# Patient Record
Sex: MALE | Race: BLACK OR AFRICAN AMERICAN | Employment: OTHER | URBAN - METROPOLITAN AREA
[De-identification: names, ages, dates, MRNs, and addresses within clinical notes are randomized per-mention and may not be internally consistent; named-entity substitution may affect disease eponyms.]

---

## 2018-04-04 ENCOUNTER — HOSPITAL ENCOUNTER (OUTPATIENT)
Dept: GENERAL RADIOLOGY | Age: 63
Discharge: HOME | End: 2018-04-04
Payer: MEDICARE

## 2018-04-04 DIAGNOSIS — M54.2 NECK PAIN: ICD-10-CM

## 2018-04-04 PROCEDURE — 72040 X-RAY EXAM NECK SPINE 2-3 VW: CPT

## 2021-01-01 ENCOUNTER — HOME CARE VISIT (OUTPATIENT)
Dept: SCHEDULING | Facility: HOME HEALTH | Age: 66
End: 2021-01-01
Payer: MEDICARE

## 2021-01-01 ENCOUNTER — HOME HEALTH ADMISSION (OUTPATIENT)
Dept: HOME HEALTH SERVICES | Facility: HOME HEALTH | Age: 66
End: 2021-01-01
Payer: MEDICARE

## 2021-01-01 ENCOUNTER — DOCUMENTATION ONLY (OUTPATIENT)
Dept: HEMATOLOGY | Age: 66
End: 2021-01-01

## 2021-01-01 ENCOUNTER — HOSPITAL ENCOUNTER (OUTPATIENT)
Age: 66
Setting detail: OBSERVATION
Discharge: HOME | End: 2021-10-08
Attending: EMERGENCY MEDICINE | Admitting: INTERNAL MEDICINE
Payer: MEDICARE

## 2021-01-01 ENCOUNTER — HOME CARE VISIT (OUTPATIENT)
Dept: HOME HEALTH SERVICES | Facility: HOME HEALTH | Age: 66
End: 2021-01-01
Payer: MEDICARE

## 2021-01-01 ENCOUNTER — HOSPITAL ENCOUNTER (OUTPATIENT)
Age: 66
Setting detail: OUTPATIENT SURGERY
Discharge: HOME | End: 2021-08-16
Attending: INTERNAL MEDICINE | Admitting: INTERNAL MEDICINE
Payer: MEDICARE

## 2021-01-01 ENCOUNTER — HOSPITAL ENCOUNTER (OUTPATIENT)
Dept: RADIATION ONCOLOGY | Age: 66
Discharge: HOME | End: 2021-10-20
Payer: MEDICARE

## 2021-01-01 ENCOUNTER — HOSPITAL ENCOUNTER (OUTPATIENT)
Dept: PET IMAGING | Age: 66
Discharge: HOME | End: 2021-08-26
Payer: MEDICARE

## 2021-01-01 ENCOUNTER — HOSPITAL ENCOUNTER (OUTPATIENT)
Dept: ULTRASOUND IMAGING | Age: 66
Discharge: HOME | End: 2021-09-03
Attending: INTERNAL MEDICINE
Payer: MEDICARE

## 2021-01-01 ENCOUNTER — APPOINTMENT (OUTPATIENT)
Dept: GENERAL RADIOLOGY | Age: 66
DRG: 871 | End: 2021-01-01
Attending: EMERGENCY MEDICINE
Payer: MEDICARE

## 2021-01-01 ENCOUNTER — HOSPITAL ENCOUNTER (OUTPATIENT)
Dept: RADIATION ONCOLOGY | Age: 66
Discharge: HOME | End: 2021-10-21
Payer: MEDICARE

## 2021-01-01 ENCOUNTER — PATIENT OUTREACH (OUTPATIENT)
Dept: CASE MANAGEMENT | Age: 66
End: 2021-01-01

## 2021-01-01 ENCOUNTER — APPOINTMENT (OUTPATIENT)
Dept: CT IMAGING | Age: 66
DRG: 871 | End: 2021-01-01
Attending: EMERGENCY MEDICINE
Payer: MEDICARE

## 2021-01-01 ENCOUNTER — HOSPITAL ENCOUNTER (OUTPATIENT)
Dept: RADIATION ONCOLOGY | Age: 66
Discharge: HOME | End: 2021-10-25
Payer: MEDICARE

## 2021-01-01 ENCOUNTER — ANESTHESIA EVENT (OUTPATIENT)
Dept: SURGERY | Age: 66
End: 2021-01-01

## 2021-01-01 ENCOUNTER — ANESTHESIA (OUTPATIENT)
Dept: SURGERY | Age: 66
End: 2021-01-01

## 2021-01-01 ENCOUNTER — HOSPITAL ENCOUNTER (OUTPATIENT)
Dept: INFUSION THERAPY | Age: 66
End: 2021-01-01

## 2021-01-01 ENCOUNTER — APPOINTMENT (OUTPATIENT)
Dept: GENERAL RADIOLOGY | Age: 66
End: 2021-01-01
Attending: INTERNAL MEDICINE
Payer: MEDICARE

## 2021-01-01 ENCOUNTER — HOSPITAL ENCOUNTER (OUTPATIENT)
Dept: RADIATION ONCOLOGY | Age: 66
Discharge: HOME | End: 2021-10-27
Payer: MEDICARE

## 2021-01-01 ENCOUNTER — TRANSCRIBE ORDER (OUTPATIENT)
Dept: SCHEDULING | Age: 66
End: 2021-01-01

## 2021-01-01 ENCOUNTER — TRANSCRIBE ORDER (OUTPATIENT)
Dept: REGISTRATION | Age: 66
End: 2021-01-01

## 2021-01-01 ENCOUNTER — HOSPITAL ENCOUNTER (OUTPATIENT)
Dept: RADIATION ONCOLOGY | Age: 66
Discharge: HOME | End: 2021-10-22
Payer: MEDICARE

## 2021-01-01 ENCOUNTER — HOSPITAL ENCOUNTER (OUTPATIENT)
Dept: RADIATION ONCOLOGY | Age: 66
Discharge: HOME | End: 2021-09-08
Payer: MEDICARE

## 2021-01-01 ENCOUNTER — APPOINTMENT (OUTPATIENT)
Dept: GENERAL RADIOLOGY | Age: 66
DRG: 054 | End: 2021-01-01
Attending: EMERGENCY MEDICINE
Payer: MEDICARE

## 2021-01-01 ENCOUNTER — APPOINTMENT (OUTPATIENT)
Dept: GENERAL RADIOLOGY | Age: 66
End: 2021-01-01
Attending: EMERGENCY MEDICINE
Payer: MEDICARE

## 2021-01-01 ENCOUNTER — HOSPITAL ENCOUNTER (INPATIENT)
Age: 66
LOS: 4 days | Discharge: HOME HEALTH | DRG: 054 | End: 2021-10-22
Attending: EMERGENCY MEDICINE | Admitting: HOSPITALIST
Payer: MEDICARE

## 2021-01-01 ENCOUNTER — HOSPITAL ENCOUNTER (OUTPATIENT)
Dept: INTERVENTIONAL RADIOLOGY/VASCULAR | Age: 66
Discharge: HOME | End: 2021-09-14
Attending: INTERNAL MEDICINE

## 2021-01-01 ENCOUNTER — APPOINTMENT (OUTPATIENT)
Dept: CT IMAGING | Age: 66
End: 2021-01-01
Attending: INTERNAL MEDICINE
Payer: MEDICARE

## 2021-01-01 ENCOUNTER — HOSPITAL ENCOUNTER (OUTPATIENT)
Dept: GENERAL RADIOLOGY | Age: 66
Discharge: HOME | End: 2021-08-05
Payer: MEDICARE

## 2021-01-01 ENCOUNTER — HOSPITAL ENCOUNTER (OUTPATIENT)
Dept: INFUSION THERAPY | Age: 66
Discharge: HOME | End: 2021-09-29
Payer: MEDICARE

## 2021-01-01 ENCOUNTER — TELEPHONE (OUTPATIENT)
Dept: CASE MANAGEMENT | Age: 66
End: 2021-01-01

## 2021-01-01 ENCOUNTER — APPOINTMENT (OUTPATIENT)
Dept: GENERAL RADIOLOGY | Age: 66
DRG: 054 | End: 2021-01-01
Attending: NURSE PRACTITIONER
Payer: MEDICARE

## 2021-01-01 ENCOUNTER — HOSPITAL ENCOUNTER (OUTPATIENT)
Dept: MRI IMAGING | Age: 66
Discharge: HOME | DRG: 054 | End: 2021-10-16
Attending: RADIOLOGY
Payer: MEDICARE

## 2021-01-01 ENCOUNTER — HOSPITAL ENCOUNTER (OUTPATIENT)
Dept: RADIATION ONCOLOGY | Age: 66
Discharge: HOME | End: 2021-09-10
Payer: MEDICARE

## 2021-01-01 ENCOUNTER — HOSPITAL ENCOUNTER (INPATIENT)
Age: 66
LOS: 1 days | DRG: 871 | End: 2021-10-29
Attending: EMERGENCY MEDICINE | Admitting: FAMILY MEDICINE
Payer: MEDICARE

## 2021-01-01 ENCOUNTER — HOSPITAL ENCOUNTER (OUTPATIENT)
Dept: LAB | Age: 66
Discharge: HOME | DRG: 846 | End: 2021-09-29
Payer: MEDICARE

## 2021-01-01 ENCOUNTER — HOSPITAL ENCOUNTER (OUTPATIENT)
Dept: RADIATION ONCOLOGY | Age: 66
End: 2021-01-01
Payer: MEDICARE

## 2021-01-01 ENCOUNTER — HOSPITAL ENCOUNTER (OUTPATIENT)
Dept: RADIATION ONCOLOGY | Age: 66
Discharge: HOME | End: 2021-10-26
Payer: MEDICARE

## 2021-01-01 ENCOUNTER — HOSPITAL ENCOUNTER (OUTPATIENT)
Dept: CT IMAGING | Age: 66
Discharge: HOME | End: 2021-08-12
Attending: INTERNAL MEDICINE
Payer: MEDICARE

## 2021-01-01 ENCOUNTER — HOSPITAL ENCOUNTER (OUTPATIENT)
Dept: RADIATION ONCOLOGY | Age: 66
Discharge: HOME | End: 2021-09-09
Payer: MEDICARE

## 2021-01-01 ENCOUNTER — HOSPITAL ENCOUNTER (OUTPATIENT)
Dept: RADIATION ONCOLOGY | Age: 66
Discharge: HOME | End: 2021-10-28
Payer: MEDICARE

## 2021-01-01 ENCOUNTER — HOSPITAL ENCOUNTER (INPATIENT)
Age: 66
LOS: 4 days | Discharge: HOME HEALTH | DRG: 846 | End: 2021-10-03
Attending: INTERNAL MEDICINE | Admitting: INTERNAL MEDICINE
Payer: MEDICARE

## 2021-01-01 ENCOUNTER — HOSPITAL ENCOUNTER (OUTPATIENT)
Dept: INFUSION THERAPY | Age: 66
Discharge: HOME | End: 2021-09-23
Payer: MEDICARE

## 2021-01-01 ENCOUNTER — HOSPITAL ENCOUNTER (OUTPATIENT)
Dept: LAB | Age: 66
Discharge: HOME | End: 2021-09-23
Payer: MEDICARE

## 2021-01-01 ENCOUNTER — HOSPITAL ENCOUNTER (EMERGENCY)
Age: 66
Discharge: LEFT BEFORE DISCHARGE | End: 2021-09-10
Payer: MEDICARE

## 2021-01-01 ENCOUNTER — HOSPITAL ENCOUNTER (OUTPATIENT)
Dept: RADIATION ONCOLOGY | Age: 66
Discharge: HOME | End: 2021-09-01
Payer: MEDICARE

## 2021-01-01 VITALS
WEIGHT: 183 LBS | HEIGHT: 71 IN | DIASTOLIC BLOOD PRESSURE: 61 MMHG | RESPIRATION RATE: 18 BRPM | BODY MASS INDEX: 25.62 KG/M2 | OXYGEN SATURATION: 100 % | TEMPERATURE: 98 F | HEART RATE: 104 BPM | SYSTOLIC BLOOD PRESSURE: 99 MMHG

## 2021-01-01 VITALS
TEMPERATURE: 97 F | HEART RATE: 83 BPM | SYSTOLIC BLOOD PRESSURE: 116 MMHG | DIASTOLIC BLOOD PRESSURE: 68 MMHG | RESPIRATION RATE: 18 BRPM | OXYGEN SATURATION: 96 %

## 2021-01-01 VITALS
OXYGEN SATURATION: 100 % | TEMPERATURE: 97.9 F | WEIGHT: 133.5 LBS | DIASTOLIC BLOOD PRESSURE: 77 MMHG | HEART RATE: 94 BPM | BODY MASS INDEX: 18.69 KG/M2 | RESPIRATION RATE: 20 BRPM | HEIGHT: 71 IN | SYSTOLIC BLOOD PRESSURE: 122 MMHG

## 2021-01-01 VITALS
DIASTOLIC BLOOD PRESSURE: 69 MMHG | WEIGHT: 176 LBS | TEMPERATURE: 98.3 F | RESPIRATION RATE: 18 BRPM | BODY MASS INDEX: 24.55 KG/M2 | OXYGEN SATURATION: 99 % | HEART RATE: 121 BPM | SYSTOLIC BLOOD PRESSURE: 101 MMHG

## 2021-01-01 VITALS
HEART RATE: 110 BPM | DIASTOLIC BLOOD PRESSURE: 69 MMHG | OXYGEN SATURATION: 98 % | WEIGHT: 176 LBS | SYSTOLIC BLOOD PRESSURE: 111 MMHG | TEMPERATURE: 98 F | BODY MASS INDEX: 24.64 KG/M2 | RESPIRATION RATE: 18 BRPM | HEIGHT: 71 IN

## 2021-01-01 VITALS
TEMPERATURE: 97 F | RESPIRATION RATE: 18 BRPM | SYSTOLIC BLOOD PRESSURE: 114 MMHG | HEART RATE: 89 BPM | OXYGEN SATURATION: 97 % | DIASTOLIC BLOOD PRESSURE: 64 MMHG

## 2021-01-01 VITALS
WEIGHT: 176 LBS | HEIGHT: 71 IN | TEMPERATURE: 97.6 F | OXYGEN SATURATION: 100 % | BODY MASS INDEX: 24.64 KG/M2 | RESPIRATION RATE: 16 BRPM | DIASTOLIC BLOOD PRESSURE: 73 MMHG | SYSTOLIC BLOOD PRESSURE: 108 MMHG | HEART RATE: 92 BPM

## 2021-01-01 VITALS
SYSTOLIC BLOOD PRESSURE: 108 MMHG | TEMPERATURE: 97.4 F | DIASTOLIC BLOOD PRESSURE: 63 MMHG | RESPIRATION RATE: 18 BRPM | HEART RATE: 97 BPM | OXYGEN SATURATION: 97 %

## 2021-01-01 VITALS
HEART RATE: 98 BPM | DIASTOLIC BLOOD PRESSURE: 65 MMHG | OXYGEN SATURATION: 97 % | RESPIRATION RATE: 18 BRPM | SYSTOLIC BLOOD PRESSURE: 111 MMHG | TEMPERATURE: 97.6 F

## 2021-01-01 VITALS
SYSTOLIC BLOOD PRESSURE: 98 MMHG | OXYGEN SATURATION: 98 % | RESPIRATION RATE: 18 BRPM | TEMPERATURE: 98.3 F | DIASTOLIC BLOOD PRESSURE: 60 MMHG | HEART RATE: 124 BPM

## 2021-01-01 VITALS
OXYGEN SATURATION: 96 % | TEMPERATURE: 96.9 F | DIASTOLIC BLOOD PRESSURE: 78 MMHG | RESPIRATION RATE: 17 BRPM | HEART RATE: 118 BPM | SYSTOLIC BLOOD PRESSURE: 118 MMHG

## 2021-01-01 VITALS
DIASTOLIC BLOOD PRESSURE: 76 MMHG | HEIGHT: 69 IN | BODY MASS INDEX: 24.02 KG/M2 | SYSTOLIC BLOOD PRESSURE: 117 MMHG | TEMPERATURE: 97.5 F | RESPIRATION RATE: 18 BRPM | HEART RATE: 103 BPM | OXYGEN SATURATION: 99 % | WEIGHT: 162.2 LBS

## 2021-01-01 VITALS
SYSTOLIC BLOOD PRESSURE: 94 MMHG | DIASTOLIC BLOOD PRESSURE: 72 MMHG | TEMPERATURE: 100.2 F | WEIGHT: 166 LBS | HEIGHT: 71 IN | RESPIRATION RATE: 18 BRPM | BODY MASS INDEX: 23.24 KG/M2 | HEART RATE: 107 BPM | OXYGEN SATURATION: 100 %

## 2021-01-01 VITALS
SYSTOLIC BLOOD PRESSURE: 110 MMHG | DIASTOLIC BLOOD PRESSURE: 62 MMHG | TEMPERATURE: 98.4 F | HEART RATE: 90 BPM | OXYGEN SATURATION: 98 % | RESPIRATION RATE: 18 BRPM

## 2021-01-01 VITALS
BODY MASS INDEX: 15.82 KG/M2 | DIASTOLIC BLOOD PRESSURE: 44 MMHG | RESPIRATION RATE: 16 BRPM | HEIGHT: 71 IN | TEMPERATURE: 97.3 F | OXYGEN SATURATION: 87 % | SYSTOLIC BLOOD PRESSURE: 107 MMHG | WEIGHT: 113 LBS

## 2021-01-01 VITALS
TEMPERATURE: 99.3 F | HEART RATE: 122 BPM | WEIGHT: 176.5 LBS | OXYGEN SATURATION: 95 % | BODY MASS INDEX: 24.62 KG/M2 | DIASTOLIC BLOOD PRESSURE: 65 MMHG | SYSTOLIC BLOOD PRESSURE: 104 MMHG

## 2021-01-01 VITALS — DIASTOLIC BLOOD PRESSURE: 69 MMHG | HEART RATE: 85 BPM | SYSTOLIC BLOOD PRESSURE: 106 MMHG | TEMPERATURE: 98.3 F

## 2021-01-01 DIAGNOSIS — J90 RECURRENT RIGHT PLEURAL EFFUSION: ICD-10-CM

## 2021-01-01 DIAGNOSIS — N17.9 SEPSIS WITH ACUTE RENAL FAILURE AND SEPTIC SHOCK, DUE TO UNSPECIFIED ORGANISM, UNSPECIFIED ACUTE RENAL FAILURE TYPE (HCC): ICD-10-CM

## 2021-01-01 DIAGNOSIS — R06.02 SHORTNESS OF BREATH: Primary | ICD-10-CM

## 2021-01-01 DIAGNOSIS — C61 PROSTATE CANCER (HCC): ICD-10-CM

## 2021-01-01 DIAGNOSIS — Z71.89 ACP (ADVANCE CARE PLANNING): ICD-10-CM

## 2021-01-01 DIAGNOSIS — E43 SEVERE PROTEIN-CALORIE MALNUTRITION (HCC): Chronic | ICD-10-CM

## 2021-01-01 DIAGNOSIS — R91.8 LUNG MASS: Primary | ICD-10-CM

## 2021-01-01 DIAGNOSIS — C79.31 BRAIN METASTASIS (HCC): ICD-10-CM

## 2021-01-01 DIAGNOSIS — I46.9 CARDIOPULMONARY ARREST (HCC): ICD-10-CM

## 2021-01-01 DIAGNOSIS — C34.90 MALIGNANT NEOPLASM OF LUNG, UNSPECIFIED LATERALITY, UNSPECIFIED PART OF LUNG (HCC): ICD-10-CM

## 2021-01-01 DIAGNOSIS — C34.90 PRIMARY MALIGNANT NEOPLASM OF LUNG METASTATIC TO OTHER SITE, UNSPECIFIED LATERALITY (HCC): ICD-10-CM

## 2021-01-01 DIAGNOSIS — C34.91 SMALL CELL LUNG CARCINOMA, RIGHT (HCC): ICD-10-CM

## 2021-01-01 DIAGNOSIS — K63.1 INTESTINAL PERFORATION (HCC): Primary | ICD-10-CM

## 2021-01-01 DIAGNOSIS — J90 PLEURAL EFFUSION: Primary | ICD-10-CM

## 2021-01-01 DIAGNOSIS — R07.89 OTHER CHEST PAIN: ICD-10-CM

## 2021-01-01 DIAGNOSIS — R07.89 OTHER CHEST PAIN: Primary | ICD-10-CM

## 2021-01-01 DIAGNOSIS — E86.0 DEHYDRATION: Primary | ICD-10-CM

## 2021-01-01 DIAGNOSIS — R65.21 SEPSIS WITH ACUTE RENAL FAILURE AND SEPTIC SHOCK, DUE TO UNSPECIFIED ORGANISM, UNSPECIFIED ACUTE RENAL FAILURE TYPE (HCC): ICD-10-CM

## 2021-01-01 DIAGNOSIS — R59.9 ADENOPATHY: ICD-10-CM

## 2021-01-01 DIAGNOSIS — M89.9 RIB LESION: ICD-10-CM

## 2021-01-01 DIAGNOSIS — R11.0 NAUSEA: ICD-10-CM

## 2021-01-01 DIAGNOSIS — R91.8 HILAR MASS: ICD-10-CM

## 2021-01-01 DIAGNOSIS — R06.6 HICCUPS: ICD-10-CM

## 2021-01-01 DIAGNOSIS — J98.59 MEDIASTINAL MASS: ICD-10-CM

## 2021-01-01 DIAGNOSIS — G89.3 CANCER RELATED PAIN: ICD-10-CM

## 2021-01-01 DIAGNOSIS — R11.2 NAUSEA AND VOMITING, INTRACTABILITY OF VOMITING NOT SPECIFIED, UNSPECIFIED VOMITING TYPE: ICD-10-CM

## 2021-01-01 DIAGNOSIS — R59.0 AXILLARY ADENOPATHY: ICD-10-CM

## 2021-01-01 DIAGNOSIS — Z75.1: ICD-10-CM

## 2021-01-01 DIAGNOSIS — R07.9 CHEST PAIN: ICD-10-CM

## 2021-01-01 DIAGNOSIS — R07.9 CHEST PAIN, UNSPECIFIED TYPE: ICD-10-CM

## 2021-01-01 DIAGNOSIS — R06.02 SHORTNESS OF BREATH: ICD-10-CM

## 2021-01-01 DIAGNOSIS — E86.0 DEHYDRATION: ICD-10-CM

## 2021-01-01 DIAGNOSIS — D64.9 ANEMIA, UNSPECIFIED TYPE: ICD-10-CM

## 2021-01-01 DIAGNOSIS — E87.6 HYPOKALEMIA: ICD-10-CM

## 2021-01-01 DIAGNOSIS — R91.8 LUNG MASS: ICD-10-CM

## 2021-01-01 DIAGNOSIS — J90 PLEURAL EFFUSION: ICD-10-CM

## 2021-01-01 DIAGNOSIS — Z51.5 ENCOUNTER FOR PALLIATIVE CARE: ICD-10-CM

## 2021-01-01 DIAGNOSIS — R07.81 RIB PAIN ON LEFT SIDE: ICD-10-CM

## 2021-01-01 DIAGNOSIS — R19.7 DIARRHEA, UNSPECIFIED TYPE: ICD-10-CM

## 2021-01-01 DIAGNOSIS — R06.02 SOB (SHORTNESS OF BREATH): ICD-10-CM

## 2021-01-01 DIAGNOSIS — C34.91 SMALL CELL LUNG CARCINOMA, RIGHT (HCC): Chronic | ICD-10-CM

## 2021-01-01 DIAGNOSIS — C34.91 SMALL CELL LUNG CARCINOMA, RIGHT (HCC): Primary | ICD-10-CM

## 2021-01-01 DIAGNOSIS — R94.8 ABNORMAL POSITRON EMISSION TOMOGRAPHY (PET) SCAN: ICD-10-CM

## 2021-01-01 DIAGNOSIS — J90 PLEURAL EFFUSION ON RIGHT: ICD-10-CM

## 2021-01-01 DIAGNOSIS — R11.2 INTRACTABLE VOMITING WITH NAUSEA: ICD-10-CM

## 2021-01-01 DIAGNOSIS — R10.84 GENERALIZED ABDOMINAL PAIN: ICD-10-CM

## 2021-01-01 DIAGNOSIS — E27.8 ADRENAL NODULE (HCC): ICD-10-CM

## 2021-01-01 DIAGNOSIS — A41.9 SEPSIS WITH ACUTE RENAL FAILURE AND SEPTIC SHOCK, DUE TO UNSPECIFIED ORGANISM, UNSPECIFIED ACUTE RENAL FAILURE TYPE (HCC): ICD-10-CM

## 2021-01-01 LAB
ACID FAST STN SPEC: NEGATIVE
ALBUMIN SERPL-MCNC: 1.3 G/DL (ref 3.2–4.6)
ALBUMIN SERPL-MCNC: 1.9 G/DL (ref 3.2–4.6)
ALBUMIN SERPL-MCNC: 2 G/DL (ref 3.2–4.6)
ALBUMIN SERPL-MCNC: 2 G/DL (ref 3.2–4.6)
ALBUMIN SERPL-MCNC: 2.1 G/DL (ref 3.2–4.6)
ALBUMIN SERPL-MCNC: 2.1 G/DL (ref 3.2–4.6)
ALBUMIN SERPL-MCNC: 2.2 G/DL (ref 3.2–4.6)
ALBUMIN SERPL-MCNC: 2.3 G/DL (ref 3.2–4.6)
ALBUMIN SERPL-MCNC: 2.4 G/DL (ref 3.2–4.6)
ALBUMIN SERPL-MCNC: 2.4 G/DL (ref 3.2–4.6)
ALBUMIN SERPL-MCNC: 2.6 G/DL (ref 3.2–4.6)
ALBUMIN SERPL-MCNC: 2.6 G/DL (ref 3.2–4.6)
ALBUMIN/GLOB SERPL: 0.3 {RATIO} (ref 1.2–3.5)
ALBUMIN/GLOB SERPL: 0.4 {RATIO} (ref 1.2–3.5)
ALP SERPL-CCNC: 50 U/L (ref 50–136)
ALP SERPL-CCNC: 52 U/L (ref 50–136)
ALP SERPL-CCNC: 54 U/L (ref 50–136)
ALP SERPL-CCNC: 55 U/L (ref 50–136)
ALP SERPL-CCNC: 56 U/L (ref 50–136)
ALP SERPL-CCNC: 58 U/L (ref 50–136)
ALP SERPL-CCNC: 65 U/L (ref 50–136)
ALP SERPL-CCNC: 65 U/L (ref 50–136)
ALP SERPL-CCNC: 72 U/L (ref 50–136)
ALP SERPL-CCNC: 72 U/L (ref 50–136)
ALP SERPL-CCNC: 78 U/L (ref 50–136)
ALP SERPL-CCNC: 80 U/L (ref 50–136)
ALP SERPL-CCNC: 80 U/L (ref 50–136)
ALP SERPL-CCNC: 81 U/L (ref 50–136)
ALT SERPL-CCNC: 10 U/L (ref 12–65)
ALT SERPL-CCNC: 10 U/L (ref 12–65)
ALT SERPL-CCNC: 12 U/L (ref 12–65)
ALT SERPL-CCNC: 13 U/L (ref 12–65)
ALT SERPL-CCNC: 14 U/L (ref 12–65)
ALT SERPL-CCNC: 17 U/L (ref 12–65)
ALT SERPL-CCNC: 1850 U/L (ref 12–65)
ALT SERPL-CCNC: 32 U/L (ref 12–65)
ALT SERPL-CCNC: 35 U/L (ref 12–65)
ALT SERPL-CCNC: 8 U/L (ref 12–65)
ALT SERPL-CCNC: 8 U/L (ref 12–65)
ALT SERPL-CCNC: 9 U/L (ref 12–65)
ANION GAP SERPL CALC-SCNC: 10 MMOL/L (ref 7–16)
ANION GAP SERPL CALC-SCNC: 12 MMOL/L (ref 7–16)
ANION GAP SERPL CALC-SCNC: 2 MMOL/L (ref 7–16)
ANION GAP SERPL CALC-SCNC: 20 MMOL/L (ref 7–16)
ANION GAP SERPL CALC-SCNC: 22 MMOL/L (ref 7–16)
ANION GAP SERPL CALC-SCNC: 5 MMOL/L (ref 7–16)
ANION GAP SERPL CALC-SCNC: 6 MMOL/L (ref 7–16)
ANION GAP SERPL CALC-SCNC: 7 MMOL/L (ref 7–16)
ANION GAP SERPL CALC-SCNC: 7 MMOL/L (ref 7–16)
ANION GAP SERPL CALC-SCNC: 8 MMOL/L (ref 7–16)
ANION GAP SERPL CALC-SCNC: 9 MMOL/L (ref 7–16)
APPEARANCE FLD: NORMAL
APPEARANCE UR: NORMAL
APTT PPP: 36.5 SEC (ref 24.1–35.1)
ARTERIAL PATENCY WRIST A: ABNORMAL
ARTERIAL PATENCY WRIST A: ABNORMAL
ARTERIAL PATENCY WRIST A: POSITIVE
AST SERPL-CCNC: 15 U/L (ref 15–37)
AST SERPL-CCNC: 18 U/L (ref 15–37)
AST SERPL-CCNC: 21 U/L (ref 15–37)
AST SERPL-CCNC: 2487 U/L (ref 15–37)
AST SERPL-CCNC: 25 U/L (ref 15–37)
AST SERPL-CCNC: 26 U/L (ref 15–37)
AST SERPL-CCNC: 27 U/L (ref 15–37)
AST SERPL-CCNC: 28 U/L (ref 15–37)
AST SERPL-CCNC: 30 U/L (ref 15–37)
AST SERPL-CCNC: 32 U/L (ref 15–37)
AST SERPL-CCNC: 38 U/L (ref 15–37)
AST SERPL-CCNC: 39 U/L (ref 15–37)
AST SERPL-CCNC: 41 U/L (ref 15–37)
AST SERPL-CCNC: 45 U/L (ref 15–37)
ATRIAL RATE: 111 BPM
ATRIAL RATE: 115 BPM
BACTERIA SPEC CULT: NORMAL
BASE DEFICIT BLD-SCNC: 14.8 MMOL/L
BASE DEFICIT BLD-SCNC: 19.9 MMOL/L
BASE DEFICIT BLD-SCNC: 23.2 MMOL/L
BASE DEFICIT BLD-SCNC: 6.1 MMOL/L
BASOPHILS # BLD: 0 K/UL (ref 0–0.2)
BASOPHILS # BLD: 0.1 K/UL (ref 0–0.2)
BASOPHILS NFR BLD: 0 % (ref 0–2)
BASOPHILS NFR BLD: 1 % (ref 0–2)
BASOPHILS NFR BLD: 2 % (ref 0–2)
BASOPHILS NFR FLD: 2 %
BDY SITE: ABNORMAL
BILIRUB SERPL-MCNC: 0.1 MG/DL (ref 0.2–1.1)
BILIRUB SERPL-MCNC: 0.1 MG/DL (ref 0.2–1.1)
BILIRUB SERPL-MCNC: 0.2 MG/DL (ref 0.2–1.1)
BILIRUB SERPL-MCNC: 0.2 MG/DL (ref 0.2–1.1)
BILIRUB SERPL-MCNC: 0.3 MG/DL (ref 0.2–1.1)
BILIRUB SERPL-MCNC: 0.4 MG/DL (ref 0.2–1.1)
BILIRUB SERPL-MCNC: 0.5 MG/DL (ref 0.2–1.1)
BILIRUB SERPL-MCNC: 0.6 MG/DL (ref 0.2–1.1)
BILIRUB SERPL-MCNC: 0.9 MG/DL (ref 0.2–1.1)
BILIRUB SERPL-MCNC: 1 MG/DL (ref 0.2–1.1)
BILIRUB UR QL: NEGATIVE
BNP SERPL-MCNC: 1198 PG/ML (ref 5–125)
BNP SERPL-MCNC: 173 PG/ML (ref 5–125)
BNP SERPL-MCNC: 229 PG/ML (ref 5–125)
BUN SERPL-MCNC: 10 MG/DL (ref 8–23)
BUN SERPL-MCNC: 10 MG/DL (ref 8–23)
BUN SERPL-MCNC: 12 MG/DL (ref 8–23)
BUN SERPL-MCNC: 12 MG/DL (ref 8–23)
BUN SERPL-MCNC: 13 MG/DL (ref 8–23)
BUN SERPL-MCNC: 14 MG/DL (ref 8–23)
BUN SERPL-MCNC: 15 MG/DL (ref 8–23)
BUN SERPL-MCNC: 29 MG/DL (ref 8–23)
BUN SERPL-MCNC: 36 MG/DL (ref 8–23)
BUN SERPL-MCNC: 4 MG/DL (ref 8–23)
BUN SERPL-MCNC: 7 MG/DL (ref 8–23)
BUN SERPL-MCNC: 9 MG/DL (ref 8–23)
CALCIUM SERPL-MCNC: 10 MG/DL (ref 8.3–10.4)
CALCIUM SERPL-MCNC: 10.2 MG/DL (ref 8.3–10.4)
CALCIUM SERPL-MCNC: 11.3 MG/DL (ref 8.3–10.4)
CALCIUM SERPL-MCNC: 8.9 MG/DL (ref 8.3–10.4)
CALCIUM SERPL-MCNC: 9.1 MG/DL (ref 8.3–10.4)
CALCIUM SERPL-MCNC: 9.2 MG/DL (ref 8.3–10.4)
CALCIUM SERPL-MCNC: 9.3 MG/DL (ref 8.3–10.4)
CALCIUM SERPL-MCNC: 9.3 MG/DL (ref 8.3–10.4)
CALCIUM SERPL-MCNC: 9.4 MG/DL (ref 8.3–10.4)
CALCIUM SERPL-MCNC: 9.5 MG/DL (ref 8.3–10.4)
CALCIUM SERPL-MCNC: 9.6 MG/DL (ref 8.3–10.4)
CALCIUM SERPL-MCNC: 9.6 MG/DL (ref 8.3–10.4)
CALCIUM SERPL-MCNC: 9.9 MG/DL (ref 8.3–10.4)
CALCULATED P AXIS, ECG09: 65 DEGREES
CALCULATED P AXIS, ECG09: 68 DEGREES
CALCULATED R AXIS, ECG10: 87 DEGREES
CALCULATED R AXIS, ECG10: 92 DEGREES
CALCULATED T AXIS, ECG11: -17 DEGREES
CALCULATED T AXIS, ECG11: 66 DEGREES
CHLORIDE SERPL-SCNC: 100 MMOL/L (ref 98–107)
CHLORIDE SERPL-SCNC: 100 MMOL/L (ref 98–107)
CHLORIDE SERPL-SCNC: 101 MMOL/L (ref 98–107)
CHLORIDE SERPL-SCNC: 102 MMOL/L (ref 98–107)
CHLORIDE SERPL-SCNC: 103 MMOL/L (ref 98–107)
CHLORIDE SERPL-SCNC: 103 MMOL/L (ref 98–107)
CHLORIDE SERPL-SCNC: 104 MMOL/L (ref 98–107)
CHLORIDE SERPL-SCNC: 105 MMOL/L (ref 98–107)
CHLORIDE SERPL-SCNC: 107 MMOL/L (ref 98–107)
CHLORIDE SERPL-SCNC: 92 MMOL/L (ref 98–107)
CHLORIDE SERPL-SCNC: 92 MMOL/L (ref 98–107)
CHLORIDE SERPL-SCNC: 96 MMOL/L (ref 98–107)
CHLORIDE SERPL-SCNC: 98 MMOL/L (ref 98–107)
CO2 BLD-SCNC: 17 MMOL/L (ref 13–23)
CO2 SERPL-SCNC: 12 MMOL/L (ref 21–32)
CO2 SERPL-SCNC: 18 MMOL/L (ref 21–32)
CO2 SERPL-SCNC: 22 MMOL/L (ref 21–32)
CO2 SERPL-SCNC: 24 MMOL/L (ref 21–32)
CO2 SERPL-SCNC: 25 MMOL/L (ref 21–32)
CO2 SERPL-SCNC: 25 MMOL/L (ref 21–32)
CO2 SERPL-SCNC: 26 MMOL/L (ref 21–32)
CO2 SERPL-SCNC: 26 MMOL/L (ref 21–32)
CO2 SERPL-SCNC: 27 MMOL/L (ref 21–32)
CO2 SERPL-SCNC: 28 MMOL/L (ref 21–32)
CO2 SERPL-SCNC: 28 MMOL/L (ref 21–32)
CO2 SERPL-SCNC: 29 MMOL/L (ref 21–32)
CO2 SERPL-SCNC: 29 MMOL/L (ref 21–32)
CO2 SERPL-SCNC: 30 MMOL/L (ref 21–32)
COLOR FLD: NORMAL
COLOR UR: YELLOW
COVID-19 RAPID TEST, COVR: NOT DETECTED
CREAT BLD-MCNC: 0.62 MG/DL (ref 0.8–1.5)
CREAT SERPL-MCNC: 0.34 MG/DL (ref 0.8–1.5)
CREAT SERPL-MCNC: 0.42 MG/DL (ref 0.8–1.5)
CREAT SERPL-MCNC: 0.44 MG/DL (ref 0.8–1.5)
CREAT SERPL-MCNC: 0.44 MG/DL (ref 0.8–1.5)
CREAT SERPL-MCNC: 0.48 MG/DL (ref 0.8–1.5)
CREAT SERPL-MCNC: 0.49 MG/DL (ref 0.8–1.5)
CREAT SERPL-MCNC: 0.51 MG/DL (ref 0.8–1.5)
CREAT SERPL-MCNC: 0.54 MG/DL (ref 0.8–1.5)
CREAT SERPL-MCNC: 0.54 MG/DL (ref 0.8–1.5)
CREAT SERPL-MCNC: 0.55 MG/DL (ref 0.8–1.5)
CREAT SERPL-MCNC: 0.56 MG/DL (ref 0.8–1.5)
CREAT SERPL-MCNC: 0.6 MG/DL (ref 0.8–1.5)
CREAT SERPL-MCNC: 0.62 MG/DL (ref 0.8–1.5)
CREAT SERPL-MCNC: 1.31 MG/DL (ref 0.8–1.5)
CREAT SERPL-MCNC: 1.65 MG/DL (ref 0.8–1.5)
D DIMER PPP FEU-MCNC: 19.19 UG/ML(FEU)
DIAGNOSIS, 93000: NORMAL
DIAGNOSIS, 93000: NORMAL
DIFFERENTIAL METHOD BLD: ABNORMAL
EOSINOPHIL # BLD: 0 K/UL (ref 0–0.8)
EOSINOPHIL # BLD: 0.1 K/UL (ref 0–0.8)
EOSINOPHIL # BLD: 0.2 K/UL (ref 0–0.8)
EOSINOPHIL # BLD: 0.2 K/UL (ref 0–0.8)
EOSINOPHIL # BLD: 0.3 K/UL (ref 0–0.8)
EOSINOPHIL NFR BLD MANUAL: 4 % (ref 1–8)
EOSINOPHIL NFR BLD: 0 % (ref 0.5–7.8)
EOSINOPHIL NFR BLD: 1 % (ref 0.5–7.8)
EOSINOPHIL NFR BLD: 2 % (ref 0.5–7.8)
EOSINOPHIL NFR BLD: 2 % (ref 0.5–7.8)
EOSINOPHIL NFR BLD: 3 % (ref 0.5–7.8)
EOSINOPHIL NFR BLD: 4 % (ref 0.5–7.8)
EOSINOPHIL NFR BLD: 4 % (ref 0.5–7.8)
EOSINOPHIL NFR BRONCH MANUAL: 5 %
ERYTHROCYTE [DISTWIDTH] IN BLOOD BY AUTOMATED COUNT: 15.9 % (ref 11.9–14.6)
ERYTHROCYTE [DISTWIDTH] IN BLOOD BY AUTOMATED COUNT: 16.1 % (ref 11.9–14.6)
ERYTHROCYTE [DISTWIDTH] IN BLOOD BY AUTOMATED COUNT: 16.2 % (ref 11.9–14.6)
ERYTHROCYTE [DISTWIDTH] IN BLOOD BY AUTOMATED COUNT: 16.2 % (ref 11.9–14.6)
ERYTHROCYTE [DISTWIDTH] IN BLOOD BY AUTOMATED COUNT: 16.3 % (ref 11.9–14.6)
ERYTHROCYTE [DISTWIDTH] IN BLOOD BY AUTOMATED COUNT: 16.4 % (ref 11.9–14.6)
ERYTHROCYTE [DISTWIDTH] IN BLOOD BY AUTOMATED COUNT: 16.4 % (ref 11.9–14.6)
ERYTHROCYTE [DISTWIDTH] IN BLOOD BY AUTOMATED COUNT: 16.7 % (ref 11.9–14.6)
ERYTHROCYTE [DISTWIDTH] IN BLOOD BY AUTOMATED COUNT: 16.9 % (ref 11.9–14.6)
ERYTHROCYTE [DISTWIDTH] IN BLOOD BY AUTOMATED COUNT: 17.2 % (ref 11.9–14.6)
ERYTHROCYTE [DISTWIDTH] IN BLOOD BY AUTOMATED COUNT: 17.2 % (ref 11.9–14.6)
ERYTHROCYTE [DISTWIDTH] IN BLOOD BY AUTOMATED COUNT: 17.4 % (ref 11.9–14.6)
ERYTHROCYTE [DISTWIDTH] IN BLOOD BY AUTOMATED COUNT: 21.1 % (ref 11.9–14.6)
ERYTHROCYTE [DISTWIDTH] IN BLOOD BY AUTOMATED COUNT: 21.7 % (ref 11.9–14.6)
FERRITIN SERPL-MCNC: 1686 NG/ML (ref 8–388)
FUNGUS CULTURE, RFCO2T: NORMAL
FUNGUS SMEAR, RFCO1T: NORMAL
FUNGUS SPEC CULT: NORMAL
FUNGUS STAIN, 188244: NORMAL
GAS FLOW.O2 O2 DELIVERY SYS: ABNORMAL L/MIN
GLOBULIN SER CALC-MCNC: 3.3 G/DL (ref 2.3–3.5)
GLOBULIN SER CALC-MCNC: 5.1 G/DL (ref 2.3–3.5)
GLOBULIN SER CALC-MCNC: 5.2 G/DL (ref 2.3–3.5)
GLOBULIN SER CALC-MCNC: 5.2 G/DL (ref 2.3–3.5)
GLOBULIN SER CALC-MCNC: 5.3 G/DL (ref 2.3–3.5)
GLOBULIN SER CALC-MCNC: 5.5 G/DL (ref 2.3–3.5)
GLOBULIN SER CALC-MCNC: 5.7 G/DL (ref 2.3–3.5)
GLOBULIN SER CALC-MCNC: 5.7 G/DL (ref 2.3–3.5)
GLOBULIN SER CALC-MCNC: 6.2 G/DL (ref 2.3–3.5)
GLOBULIN SER CALC-MCNC: 6.3 G/DL (ref 2.3–3.5)
GLOBULIN SER CALC-MCNC: 6.5 G/DL (ref 2.3–3.5)
GLOBULIN SER CALC-MCNC: 6.6 G/DL (ref 2.3–3.5)
GLUCOSE BLD STRIP.AUTO-MCNC: 104 MG/DL (ref 65–100)
GLUCOSE BLD STRIP.AUTO-MCNC: 141 MG/DL (ref 65–100)
GLUCOSE BLD STRIP.AUTO-MCNC: 41 MG/DL (ref 65–100)
GLUCOSE FLD-MCNC: 74 MG/DL
GLUCOSE SERPL-MCNC: 113 MG/DL (ref 65–100)
GLUCOSE SERPL-MCNC: 117 MG/DL (ref 65–100)
GLUCOSE SERPL-MCNC: 121 MG/DL (ref 65–100)
GLUCOSE SERPL-MCNC: 129 MG/DL (ref 65–100)
GLUCOSE SERPL-MCNC: 154 MG/DL (ref 65–100)
GLUCOSE SERPL-MCNC: 221 MG/DL (ref 65–100)
GLUCOSE SERPL-MCNC: 245 MG/DL (ref 65–100)
GLUCOSE SERPL-MCNC: 79 MG/DL (ref 65–100)
GLUCOSE SERPL-MCNC: 82 MG/DL (ref 65–100)
GLUCOSE SERPL-MCNC: 83 MG/DL (ref 65–100)
GLUCOSE SERPL-MCNC: 85 MG/DL (ref 65–100)
GLUCOSE SERPL-MCNC: 89 MG/DL (ref 65–100)
GLUCOSE SERPL-MCNC: 89 MG/DL (ref 65–100)
GLUCOSE SERPL-MCNC: 92 MG/DL (ref 65–100)
GLUCOSE SERPL-MCNC: 92 MG/DL (ref 65–100)
GLUCOSE SERPL-MCNC: 93 MG/DL (ref 65–100)
GLUCOSE SERPL-MCNC: 98 MG/DL (ref 65–100)
GLUCOSE UR STRIP.AUTO-MCNC: NEGATIVE MG/DL
GRAM STN SPEC: NORMAL
GRAM STN SPEC: NORMAL
HAV IGM SER QL: NONREACTIVE
HBV CORE AB SERPL QL IA: NEGATIVE
HBV CORE IGM SER QL: NONREACTIVE
HBV SURFACE AB SERPL IA-ACNC: <3.1 MIU/ML
HBV SURFACE AG SER QL: NONREACTIVE
HCO3 BLD-SCNC: 15.7 MMOL/L (ref 22–26)
HCO3 BLD-SCNC: 5.4 MMOL/L (ref 22–26)
HCO3 BLD-SCNC: 7.7 MMOL/L (ref 22–26)
HCO3 BLD-SCNC: 8.9 MMOL/L (ref 22–26)
HCT VFR BLD AUTO: 23.9 % (ref 41.1–50.3)
HCT VFR BLD AUTO: 26.5 % (ref 41.1–50.3)
HCT VFR BLD AUTO: 26.7 % (ref 41.1–50.3)
HCT VFR BLD AUTO: 27.7 % (ref 41.1–50.3)
HCT VFR BLD AUTO: 28 % (ref 41.1–50.3)
HCT VFR BLD AUTO: 28.1 % (ref 41.1–50.3)
HCT VFR BLD AUTO: 28.4 % (ref 41.1–50.3)
HCT VFR BLD AUTO: 28.4 % (ref 41.1–50.3)
HCT VFR BLD AUTO: 29.1 % (ref 41.1–50.3)
HCT VFR BLD AUTO: 29.6 %
HCT VFR BLD AUTO: 29.7 % (ref 41.1–50.3)
HCT VFR BLD AUTO: 29.8 % (ref 41.1–50.3)
HCT VFR BLD AUTO: 30.4 % (ref 41.1–50.3)
HCT VFR BLD AUTO: 31.3 % (ref 41.1–50.3)
HCT VFR BLD AUTO: 32.5 %
HCT VFR BLD AUTO: 41.7 % (ref 41.1–50.3)
HCV AB SER QL: NONREACTIVE
HGB BLD-MCNC: 10.3 G/DL (ref 13.6–17.2)
HGB BLD-MCNC: 12.8 G/DL (ref 13.6–17.2)
HGB BLD-MCNC: 7.2 G/DL (ref 13.6–17.2)
HGB BLD-MCNC: 8.1 G/DL (ref 13.6–17.2)
HGB BLD-MCNC: 8.3 G/DL (ref 13.6–17.2)
HGB BLD-MCNC: 8.4 G/DL (ref 13.6–17.2)
HGB BLD-MCNC: 8.6 G/DL (ref 13.6–17.2)
HGB BLD-MCNC: 8.6 G/DL (ref 13.6–17.2)
HGB BLD-MCNC: 8.9 G/DL (ref 13.6–17.2)
HGB BLD-MCNC: 9 G/DL (ref 13.6–17.2)
HGB BLD-MCNC: 9.3 G/DL (ref 13.6–17.2)
HGB BLD-MCNC: 9.4 G/DL (ref 13.6–17.2)
HGB BLD-MCNC: 9.5 G/DL (ref 13.6–17.2)
HGB BLD-MCNC: 9.6 G/DL (ref 13.6–17.2)
HGB UR QL STRIP: NEGATIVE
IGG SERPL-MCNC: 1630 MG/DL (ref 700–1600)
IMM GRANULOCYTES # BLD AUTO: 0 K/UL (ref 0–0.5)
IMM GRANULOCYTES # BLD AUTO: 0 K/UL (ref 0–0.5)
IMM GRANULOCYTES # BLD AUTO: 0.1 K/UL (ref 0–0.5)
IMM GRANULOCYTES # BLD AUTO: 0.2 K/UL (ref 0–0.5)
IMM GRANULOCYTES NFR BLD AUTO: 1 % (ref 0–5)
IMM GRANULOCYTES NFR BLD AUTO: 2 % (ref 0–5)
IMM GRANULOCYTES NFR BLD AUTO: 2 % (ref 0–5)
IMM GRANULOCYTES NFR BLD AUTO: 5 % (ref 0–5)
INR PPP: 1.1
INSPIRATION.DURATION SETTING TIME VENT: 0.9 SEC
INSPIRATION.DURATION SETTING TIME VENT: 0.9 SEC
IRON SATN MFR SERPL: 11 %
IRON SERPL-MCNC: 21 UG/DL (ref 35–150)
KETONES UR QL STRIP.AUTO: NEGATIVE MG/DL
LACTATE SERPL-SCNC: 1.2 MMOL/L (ref 0.4–2)
LACTATE SERPL-SCNC: 10.9 MMOL/L (ref 0.4–2)
LACTATE SERPL-SCNC: 11.2 MMOL/L (ref 0.4–2)
LACTATE SERPL-SCNC: 14.1 MMOL/L (ref 0.4–2)
LDH FLD L TO P-CCNC: 731 U/L
LEUKOCYTE ESTERASE UR QL STRIP.AUTO: NEGATIVE
LIPASE SERPL-CCNC: 75 U/L (ref 73–393)
LIPASE SERPL-CCNC: 93 U/L (ref 73–393)
LIPASE SERPL-CCNC: 938 U/L (ref 73–393)
LYMPHOCYTES # BLD: 0.3 K/UL (ref 0.5–4.6)
LYMPHOCYTES # BLD: 0.4 K/UL (ref 0.5–4.6)
LYMPHOCYTES # BLD: 0.4 K/UL (ref 0.5–4.6)
LYMPHOCYTES # BLD: 0.5 K/UL (ref 0.5–4.6)
LYMPHOCYTES # BLD: 0.6 K/UL (ref 0.5–4.6)
LYMPHOCYTES # BLD: 0.9 K/UL (ref 0.5–4.6)
LYMPHOCYTES # BLD: 0.9 K/UL (ref 0.5–4.6)
LYMPHOCYTES # BLD: 1 K/UL (ref 0.5–4.6)
LYMPHOCYTES # BLD: 1.1 K/UL (ref 0.5–4.6)
LYMPHOCYTES # BLD: 1.2 K/UL (ref 0.5–4.6)
LYMPHOCYTES # BLD: 1.2 K/UL (ref 0.5–4.6)
LYMPHOCYTES # BLD: 1.3 K/UL (ref 0.5–4.6)
LYMPHOCYTES # BLD: 1.4 K/UL (ref 0.5–4.6)
LYMPHOCYTES # BLD: 1.5 K/UL (ref 0.5–4.6)
LYMPHOCYTES NFR BLD MANUAL: 10 % (ref 16–44)
LYMPHOCYTES NFR BLD MANUAL: 10 % (ref 16–44)
LYMPHOCYTES NFR BLD MANUAL: 20 % (ref 16–44)
LYMPHOCYTES NFR BLD MANUAL: 5 % (ref 16–44)
LYMPHOCYTES NFR BLD MANUAL: 7 % (ref 16–44)
LYMPHOCYTES NFR BLD MANUAL: 9 % (ref 16–44)
LYMPHOCYTES NFR BLD: 11 % (ref 13–44)
LYMPHOCYTES NFR BLD: 15 % (ref 13–44)
LYMPHOCYTES NFR BLD: 15 % (ref 13–44)
LYMPHOCYTES NFR BLD: 16 % (ref 13–44)
LYMPHOCYTES NFR BLD: 18 % (ref 13–44)
LYMPHOCYTES NFR BLD: 24 % (ref 13–44)
LYMPHOCYTES NFR BLD: 30 % (ref 13–44)
LYMPHOCYTES NFR BLD: 4 % (ref 13–44)
LYMPHOCYTES NFR BLD: 5 % (ref 13–44)
LYMPHOCYTES NFR BLD: 6 % (ref 13–44)
LYMPHOCYTES NFR BRONCH MANUAL: 91 %
Lab: NORMAL
MAGNESIUM SERPL-MCNC: 1.4 MG/DL (ref 1.8–2.4)
MAGNESIUM SERPL-MCNC: 1.6 MG/DL (ref 1.8–2.4)
MAGNESIUM SERPL-MCNC: 1.7 MG/DL (ref 1.8–2.4)
MAGNESIUM SERPL-MCNC: 1.8 MG/DL (ref 1.8–2.4)
MCH RBC QN AUTO: 23.5 PG (ref 26.1–32.9)
MCH RBC QN AUTO: 23.5 PG (ref 26.1–32.9)
MCH RBC QN AUTO: 23.6 PG (ref 26.1–32.9)
MCH RBC QN AUTO: 23.7 PG (ref 26.1–32.9)
MCH RBC QN AUTO: 24 PG (ref 26.1–32.9)
MCH RBC QN AUTO: 24.1 PG (ref 26.1–32.9)
MCH RBC QN AUTO: 24.2 PG (ref 26.1–32.9)
MCH RBC QN AUTO: 24.3 PG (ref 26.1–32.9)
MCH RBC QN AUTO: 24.3 PG (ref 26.1–32.9)
MCH RBC QN AUTO: 24.4 PG (ref 26.1–32.9)
MCH RBC QN AUTO: 24.4 PG (ref 26.1–32.9)
MCH RBC QN AUTO: 24.5 PG (ref 26.1–32.9)
MCH RBC QN AUTO: 24.9 PG (ref 26.1–32.9)
MCH RBC QN AUTO: 25.5 PG (ref 26.1–32.9)
MCHC RBC AUTO-ENTMCNC: 28.8 G/DL (ref 31.4–35)
MCHC RBC AUTO-ENTMCNC: 29.9 G/DL (ref 31.4–35)
MCHC RBC AUTO-ENTMCNC: 30.1 G/DL (ref 31.4–35)
MCHC RBC AUTO-ENTMCNC: 30.6 G/DL (ref 31.4–35)
MCHC RBC AUTO-ENTMCNC: 30.7 G/DL (ref 31.4–35)
MCHC RBC AUTO-ENTMCNC: 30.7 G/DL (ref 31.4–35)
MCHC RBC AUTO-ENTMCNC: 30.9 G/DL (ref 31.4–35)
MCHC RBC AUTO-ENTMCNC: 31 G/DL (ref 31.4–35)
MCHC RBC AUTO-ENTMCNC: 31.1 G/DL (ref 31.4–35)
MCHC RBC AUTO-ENTMCNC: 31.2 G/DL (ref 31.4–35)
MCHC RBC AUTO-ENTMCNC: 31.3 G/DL (ref 31.4–35)
MCHC RBC AUTO-ENTMCNC: 31.6 G/DL (ref 31.4–35)
MCHC RBC AUTO-ENTMCNC: 31.7 G/DL (ref 31.4–35)
MCHC RBC AUTO-ENTMCNC: 31.7 G/DL (ref 31.4–35)
MCHC RBC AUTO-ENTMCNC: 31.8 G/DL (ref 31.4–35)
MCHC RBC AUTO-ENTMCNC: 32 G/DL (ref 31.4–35)
MCV RBC AUTO: 75.6 FL (ref 79.6–97.8)
MCV RBC AUTO: 76.2 FL (ref 79.6–97.8)
MCV RBC AUTO: 76.3 FL (ref 79.6–97.8)
MCV RBC AUTO: 76.7 FL (ref 79.6–97.8)
MCV RBC AUTO: 77.2 FL (ref 79.6–97.8)
MCV RBC AUTO: 77.9 FL (ref 79.6–97.8)
MCV RBC AUTO: 78.1 FL (ref 79.6–97.8)
MCV RBC AUTO: 78.2 FL (ref 79.6–97.8)
MCV RBC AUTO: 78.3 FL (ref 79.6–97.8)
MCV RBC AUTO: 78.4 FL (ref 79.6–97.8)
MCV RBC AUTO: 78.5 FL (ref 79.6–97.8)
MCV RBC AUTO: 78.7 FL (ref 79.6–97.8)
MCV RBC AUTO: 79.1 FL (ref 79.6–97.8)
MCV RBC AUTO: 79.8 FL (ref 79.6–97.8)
MCV RBC AUTO: 80.9 FL (ref 79.6–97.8)
MCV RBC AUTO: 83.7 FL (ref 79.6–97.8)
METAMYELOCYTES NFR BLD MANUAL: 1 %
METAMYELOCYTES NFR BLD MANUAL: 4 %
METAMYELOCYTES NFR BLD MANUAL: 5 %
METAMYELOCYTES NFR BLD MANUAL: 6 %
MONOCYTES # BLD: 0.1 K/UL (ref 0.1–1.3)
MONOCYTES # BLD: 0.2 K/UL (ref 0.1–1.3)
MONOCYTES # BLD: 0.3 K/UL (ref 0.1–1.3)
MONOCYTES # BLD: 0.3 K/UL (ref 0.1–1.3)
MONOCYTES # BLD: 0.6 K/UL (ref 0.1–1.3)
MONOCYTES # BLD: 0.8 K/UL (ref 0.1–1.3)
MONOCYTES # BLD: 1.1 K/UL (ref 0.1–1.3)
MONOCYTES # BLD: 1.4 K/UL (ref 0.1–1.3)
MONOCYTES # BLD: 1.4 K/UL (ref 0.1–1.3)
MONOCYTES # BLD: 1.5 K/UL (ref 0.1–1.3)
MONOCYTES # BLD: 1.7 K/UL (ref 0.1–1.3)
MONOCYTES # BLD: 1.8 K/UL (ref 0.1–1.3)
MONOCYTES # BLD: 1.8 K/UL (ref 0.1–1.3)
MONOCYTES # BLD: 2.2 K/UL (ref 0.1–1.3)
MONOCYTES NFR BLD MANUAL: 10 % (ref 3–9)
MONOCYTES NFR BLD MANUAL: 11 % (ref 3–9)
MONOCYTES NFR BLD MANUAL: 11 % (ref 3–9)
MONOCYTES NFR BLD MANUAL: 13 % (ref 3–9)
MONOCYTES NFR BLD MANUAL: 24 % (ref 3–9)
MONOCYTES NFR BLD MANUAL: 4 % (ref 3–9)
MONOCYTES NFR BLD: 17 % (ref 4–12)
MONOCYTES NFR BLD: 19 % (ref 4–12)
MONOCYTES NFR BLD: 19 % (ref 4–12)
MONOCYTES NFR BLD: 2 % (ref 4–12)
MONOCYTES NFR BLD: 2 % (ref 4–12)
MONOCYTES NFR BLD: 24 % (ref 4–12)
MONOCYTES NFR BLD: 3 % (ref 4–12)
MONOCYTES NFR BLD: 3 % (ref 4–12)
MONOCYTES NFR BLD: 5 % (ref 4–12)
MONOCYTES NFR BLD: 8 % (ref 4–12)
MYCOBACTERIUM SPEC QL CULT: NEGATIVE
MYELOCYTES NFR BLD MANUAL: 1 %
MYELOCYTES NFR BLD MANUAL: 2 %
MYELOCYTES NFR BLD MANUAL: 2 %
MYELOCYTES NFR BLD MANUAL: 4 %
NEUTROPHILS NFR BRONCH MANUAL: 2 %
NEUTS BAND NFR BLD MANUAL: 1 % (ref 0–6)
NEUTS BAND NFR BLD MANUAL: 2 % (ref 0–6)
NEUTS BAND NFR BLD MANUAL: 25 % (ref 0–10)
NEUTS BAND NFR BLD MANUAL: 8 % (ref 0–6)
NEUTS SEG # BLD: 1.8 K/UL (ref 1.7–8.2)
NEUTS SEG # BLD: 10.3 K/UL (ref 1.7–8.2)
NEUTS SEG # BLD: 12.4 K/UL (ref 1.7–8.2)
NEUTS SEG # BLD: 2.5 K/UL (ref 1.7–8.2)
NEUTS SEG # BLD: 3.9 K/UL (ref 1.7–8.2)
NEUTS SEG # BLD: 4 K/UL (ref 1.7–8.2)
NEUTS SEG # BLD: 4.3 K/UL (ref 1.7–8.2)
NEUTS SEG # BLD: 4.6 K/UL (ref 1.7–8.2)
NEUTS SEG # BLD: 5.2 K/UL (ref 1.7–8.2)
NEUTS SEG # BLD: 5.3 K/UL (ref 1.7–8.2)
NEUTS SEG # BLD: 5.6 K/UL (ref 1.7–8.2)
NEUTS SEG # BLD: 7.7 K/UL (ref 1.7–8.2)
NEUTS SEG # BLD: 8 K/UL (ref 1.7–8.2)
NEUTS SEG # BLD: 8.5 K/UL (ref 1.7–8.2)
NEUTS SEG # BLD: 8.6 K/UL (ref 1.7–8.2)
NEUTS SEG # BLD: 9.4 K/UL (ref 1.7–8.2)
NEUTS SEG NFR BLD MANUAL: 50 % (ref 47–75)
NEUTS SEG NFR BLD MANUAL: 52 % (ref 47–75)
NEUTS SEG NFR BLD MANUAL: 66 % (ref 47–75)
NEUTS SEG NFR BLD MANUAL: 69 % (ref 47–75)
NEUTS SEG NFR BLD MANUAL: 78 % (ref 47–75)
NEUTS SEG NFR BLD MANUAL: 81 % (ref 47–75)
NEUTS SEG NFR BLD: 57 % (ref 43–78)
NEUTS SEG NFR BLD: 60 % (ref 43–78)
NEUTS SEG NFR BLD: 60 % (ref 43–78)
NEUTS SEG NFR BLD: 63 % (ref 43–78)
NEUTS SEG NFR BLD: 64 % (ref 43–78)
NEUTS SEG NFR BLD: 69 % (ref 43–78)
NEUTS SEG NFR BLD: 79 % (ref 43–78)
NEUTS SEG NFR BLD: 86 % (ref 43–78)
NEUTS SEG NFR BLD: 88 % (ref 43–78)
NEUTS SEG NFR BLD: 94 % (ref 43–78)
NITRITE UR QL STRIP.AUTO: NEGATIVE
NRBC # BLD: 0 K/UL (ref 0–0.2)
NRBC # BLD: 0.02 K/UL (ref 0–0.2)
NRBC # BLD: 0.02 K/UL (ref 0–0.2)
NRBC # BLD: 0.06 K/UL (ref 0–0.2)
NRBC # BLD: 0.11 K/UL (ref 0–0.2)
NRBC # BLD: 0.15 K/UL (ref 0–0.2)
NUC CELL # FLD: 3093 /CU MM
O2/TOTAL GAS SETTING VFR VENT: 100 %
O2/TOTAL GAS SETTING VFR VENT: 21 %
O2/TOTAL GAS SETTING VFR VENT: 40 %
O2/TOTAL GAS SETTING VFR VENT: 50 %
P-R INTERVAL, ECG05: 124 MS
P-R INTERVAL, ECG05: 132 MS
PAW @ MEAN EXP FLOW ON VENT: 15 CMH2O
PAW @ MEAN EXP FLOW ON VENT: 15 CMH2O
PCO2 BLD: 17.6 MMHG (ref 35–45)
PCO2 BLD: 19.1 MMHG (ref 35–45)
PCO2 BLD: 22.8 MMHG (ref 35–45)
PCO2 BLD: 23.3 MMHG (ref 35–45)
PEEP RESPIRATORY: 8 CMH2O
PEEP RESPIRATORY: 8 CMH2O
PH BLD: 7.06 [PH] (ref 7.35–7.45)
PH BLD: 7.13 [PH] (ref 7.35–7.45)
PH BLD: 7.31 [PH] (ref 7.35–7.45)
PH BLD: 7.45 [PH] (ref 7.35–7.45)
PH UR STRIP: 6.5 [PH] (ref 5–9)
PIP ISTAT,IPIP: 21
PIP ISTAT,IPIP: 24
PLATELET # BLD AUTO: 256 K/UL (ref 150–450)
PLATELET # BLD AUTO: 300 K/UL (ref 150–450)
PLATELET # BLD AUTO: 301 K/UL (ref 150–450)
PLATELET # BLD AUTO: 362 K/UL (ref 150–450)
PLATELET # BLD AUTO: 367 K/UL (ref 150–450)
PLATELET # BLD AUTO: 378 K/UL (ref 150–450)
PLATELET # BLD AUTO: 422 K/UL (ref 150–450)
PLATELET # BLD AUTO: 480 K/UL (ref 150–450)
PLATELET # BLD AUTO: 513 K/UL (ref 150–450)
PLATELET # BLD AUTO: 526 K/UL (ref 150–450)
PLATELET # BLD AUTO: 574 K/UL (ref 150–450)
PLATELET # BLD AUTO: 758 K/UL (ref 150–450)
PLATELET # BLD AUTO: 824 K/UL (ref 150–450)
PLATELET # BLD AUTO: 847 K/UL (ref 150–450)
PLATELET # BLD AUTO: 873 K/UL (ref 150–450)
PLATELET # BLD AUTO: 923 K/UL (ref 150–450)
PLATELET COMMENTS,PCOM: ABNORMAL
PMV BLD AUTO: 10.2 FL (ref 9.4–12.3)
PMV BLD AUTO: 10.2 FL (ref 9.4–12.3)
PMV BLD AUTO: 8.6 FL (ref 9.4–12.3)
PMV BLD AUTO: 8.6 FL (ref 9.4–12.3)
PMV BLD AUTO: 8.7 FL (ref 9.4–12.3)
PMV BLD AUTO: 8.7 FL (ref 9.4–12.3)
PMV BLD AUTO: 8.9 FL (ref 9.4–12.3)
PMV BLD AUTO: 9.1 FL (ref 9.4–12.3)
PMV BLD AUTO: 9.2 FL (ref 9.4–12.3)
PMV BLD AUTO: 9.2 FL (ref 9.4–12.3)
PMV BLD AUTO: 9.4 FL (ref 9.4–12.3)
PMV BLD AUTO: 9.5 FL (ref 9.4–12.3)
PMV BLD AUTO: 9.9 FL (ref 9.4–12.3)
PO2 BLD: 160 MMHG (ref 75–100)
PO2 BLD: 246 MMHG (ref 75–100)
PO2 BLD: 331 MMHG (ref 75–100)
PO2 BLD: 87 MMHG (ref 75–100)
POTASSIUM SERPL-SCNC: 3 MMOL/L (ref 3.5–5.1)
POTASSIUM SERPL-SCNC: 3.2 MMOL/L (ref 3.5–5.1)
POTASSIUM SERPL-SCNC: 3.3 MMOL/L (ref 3.5–5.1)
POTASSIUM SERPL-SCNC: 3.3 MMOL/L (ref 3.5–5.1)
POTASSIUM SERPL-SCNC: 3.6 MMOL/L (ref 3.5–5.1)
POTASSIUM SERPL-SCNC: 3.7 MMOL/L (ref 3.5–5.1)
POTASSIUM SERPL-SCNC: 3.8 MMOL/L (ref 3.5–5.1)
POTASSIUM SERPL-SCNC: 4 MMOL/L (ref 3.5–5.1)
POTASSIUM SERPL-SCNC: 4.1 MMOL/L (ref 3.5–5.1)
POTASSIUM SERPL-SCNC: 4.2 MMOL/L (ref 3.5–5.1)
POTASSIUM SERPL-SCNC: 4.3 MMOL/L (ref 3.5–5.1)
POTASSIUM SERPL-SCNC: 4.4 MMOL/L (ref 3.5–5.1)
POTASSIUM SERPL-SCNC: 4.5 MMOL/L (ref 3.5–5.1)
POTASSIUM SERPL-SCNC: 4.6 MMOL/L (ref 3.5–5.1)
POTASSIUM SERPL-SCNC: 4.8 MMOL/L (ref 3.5–5.1)
PROCALCITONIN SERPL-MCNC: 46.23 NG/ML
PROMYELOCYTES NFR BLD MANUAL: 1 %
PROT FLD-MCNC: 5.9 G/DL
PROT SERPL-MCNC: 4.6 G/DL (ref 6.3–8.2)
PROT SERPL-MCNC: 7.1 G/DL (ref 6.3–8.2)
PROT SERPL-MCNC: 7.2 G/DL (ref 6.3–8.2)
PROT SERPL-MCNC: 7.2 G/DL (ref 6.3–8.2)
PROT SERPL-MCNC: 7.4 G/DL (ref 6.3–8.2)
PROT SERPL-MCNC: 7.5 G/DL (ref 6.3–8.2)
PROT SERPL-MCNC: 7.5 G/DL (ref 6.3–8.2)
PROT SERPL-MCNC: 7.6 G/DL (ref 6.3–8.2)
PROT SERPL-MCNC: 7.9 G/DL (ref 6.3–8.2)
PROT SERPL-MCNC: 8.1 G/DL (ref 6.3–8.2)
PROT SERPL-MCNC: 8.6 G/DL (ref 6.3–8.2)
PROT SERPL-MCNC: 8.7 G/DL (ref 6.3–8.2)
PROT SERPL-MCNC: 8.9 G/DL (ref 6.3–8.2)
PROT SERPL-MCNC: 9.2 G/DL (ref 6.3–8.2)
PROT UR STRIP-MCNC: NEGATIVE MG/DL
PROTHROMBIN TIME: 14.4 SEC (ref 12.6–14.5)
Q-T INTERVAL, ECG07: 292 MS
Q-T INTERVAL, ECG07: 310 MS
QRS DURATION, ECG06: 82 MS
QRS DURATION, ECG06: 82 MS
QTC CALCULATION (BEZET), ECG08: 403 MS
QTC CALCULATION (BEZET), ECG08: 421 MS
RBC # BLD AUTO: 3.07 M/UL (ref 4.23–5.6)
RBC # BLD AUTO: 3.35 M/UL (ref 4.23–5.6)
RBC # BLD AUTO: 3.5 M/UL (ref 4.23–5.6)
RBC # BLD AUTO: 3.51 M/UL (ref 4.23–5.6)
RBC # BLD AUTO: 3.52 M/UL (ref 4.23–5.6)
RBC # BLD AUTO: 3.58 M/UL (ref 4.23–5.6)
RBC # BLD AUTO: 3.63 M/UL (ref 4.23–5.6)
RBC # BLD AUTO: 3.68 M/UL (ref 4.23–5.6)
RBC # BLD AUTO: 3.71 M/UL (ref 4.23–5.6)
RBC # BLD AUTO: 3.74 M/UL (ref 4.23–5.6)
RBC # BLD AUTO: 3.76 M/UL (ref 4.23–5.6)
RBC # BLD AUTO: 3.78 M/UL (ref 4.23–5.6)
RBC # BLD AUTO: 3.9 M/UL (ref 4.23–5.6)
RBC # BLD AUTO: 3.94 M/UL (ref 4.23–5.6)
RBC # BLD AUTO: 4.24 M/UL (ref 4.23–5.6)
RBC # BLD AUTO: 5.34 M/UL (ref 4.23–5.6)
RBC # FLD: NORMAL /CU MM
RBC MORPH BLD: ABNORMAL
REFERENCE LAB,REFLB: NORMAL
REFLEX TO ID, RFCO3T: NORMAL
SAO2 % BLD: 97.3 % (ref 95–98)
SAO2 % BLD: 99.3 % (ref 95–98)
SAO2 % BLD: 99.7 % (ref 95–98)
SAO2 % BLD: 99.8 % (ref 95–98)
SARS-COV-2, COV2: NOT DETECTED
SERVICE CMNT-IMP: ABNORMAL
SERVICE CMNT-IMP: NORMAL
SODIUM SERPL-SCNC: 130 MMOL/L (ref 136–145)
SODIUM SERPL-SCNC: 130 MMOL/L (ref 136–145)
SODIUM SERPL-SCNC: 131 MMOL/L (ref 136–145)
SODIUM SERPL-SCNC: 133 MMOL/L (ref 136–145)
SODIUM SERPL-SCNC: 134 MMOL/L (ref 136–145)
SODIUM SERPL-SCNC: 134 MMOL/L (ref 136–145)
SODIUM SERPL-SCNC: 135 MMOL/L (ref 136–145)
SODIUM SERPL-SCNC: 136 MMOL/L (ref 136–145)
SODIUM SERPL-SCNC: 136 MMOL/L (ref 136–145)
SODIUM SERPL-SCNC: 137 MMOL/L (ref 136–145)
SODIUM SERPL-SCNC: 139 MMOL/L (ref 136–145)
SODIUM SERPL-SCNC: 139 MMOL/L (ref 138–145)
SOURCE, COVRS: NORMAL
SP GR UR REFRACTOMETRY: 1.02 (ref 1–1.02)
SPECIMEN PREPARATION: NORMAL
SPECIMEN SOURCE FLD: NORMAL
SPECIMEN SOURCE, FCOV2M: NORMAL
SPECIMEN SOURCE: NORMAL
SPECIMEN TYPE: ABNORMAL
TEST DESCRIPTION:,ATST: NORMAL
TIBC SERPL-MCNC: 183 UG/DL (ref 250–450)
TROPONIN-HIGH SENSITIVITY: 11.2 PG/ML (ref 0–14)
TROPONIN-HIGH SENSITIVITY: 38.3 PG/ML (ref 0–14)
TROPONIN-HIGH SENSITIVITY: 38.7 PG/ML (ref 0–14)
TSH SERPL DL<=0.005 MIU/L-ACNC: 0.74 UIU/ML (ref 0.36–3.74)
TSH SERPL DL<=0.005 MIU/L-ACNC: 1.03 UIU/ML
UROBILINOGEN UR QL STRIP.AUTO: 0.2 EU/DL (ref 0.2–1)
VENTILATION MODE VENT: ABNORMAL
VENTILATION MODE VENT: ABNORMAL
VENTRICULAR RATE, ECG03: 111 BPM
VENTRICULAR RATE, ECG03: 115 BPM
VT SETTING VENT: 470 ML
VT SETTING VENT: 470 ML
WBC # BLD AUTO: 10 K/UL (ref 4.3–11.1)
WBC # BLD AUTO: 10 K/UL (ref 4.3–11.1)
WBC # BLD AUTO: 11 K/UL (ref 4.3–11.1)
WBC # BLD AUTO: 11.7 K/UL (ref 4.3–11.1)
WBC # BLD AUTO: 13 K/UL (ref 4.3–11.1)
WBC # BLD AUTO: 15.5 K/UL (ref 4.3–11.1)
WBC # BLD AUTO: 2.9 K/UL (ref 4.3–11.1)
WBC # BLD AUTO: 4 K/UL (ref 4.3–11.1)
WBC # BLD AUTO: 5.8 K/UL (ref 4.3–11.1)
WBC # BLD AUTO: 6.4 K/UL (ref 4.3–11.1)
WBC # BLD AUTO: 6.4 K/UL (ref 4.3–11.1)
WBC # BLD AUTO: 6.5 K/UL (ref 4.3–11.1)
WBC # BLD AUTO: 7.3 K/UL (ref 4.3–11.1)
WBC # BLD AUTO: 7.6 K/UL (ref 4.3–11.1)
WBC # BLD AUTO: 8.2 K/UL (ref 4.3–11.1)
WBC # BLD AUTO: 8.6 K/UL (ref 4.3–11.1)
WBC MORPH BLD: ABNORMAL

## 2021-01-01 PROCEDURE — 86706 HEP B SURFACE ANTIBODY: CPT

## 2021-01-01 PROCEDURE — 80053 COMPREHEN METABOLIC PANEL: CPT

## 2021-01-01 PROCEDURE — 99285 EMERGENCY DEPT VISIT HI MDM: CPT

## 2021-01-01 PROCEDURE — 88342 IMHCHEM/IMCYTCHM 1ST ANTB: CPT

## 2021-01-01 PROCEDURE — 97530 THERAPEUTIC ACTIVITIES: CPT

## 2021-01-01 PROCEDURE — 3331090001 HH PPS REVENUE CREDIT

## 2021-01-01 PROCEDURE — 71045 X-RAY EXAM CHEST 1 VIEW: CPT

## 2021-01-01 PROCEDURE — 3331090002 HH PPS REVENUE DEBIT

## 2021-01-01 PROCEDURE — 74011000250 HC RX REV CODE- 250

## 2021-01-01 PROCEDURE — 83690 ASSAY OF LIPASE: CPT

## 2021-01-01 PROCEDURE — 97161 PT EVAL LOW COMPLEX 20 MIN: CPT

## 2021-01-01 PROCEDURE — 36592 COLLECT BLOOD FROM PICC: CPT

## 2021-01-01 PROCEDURE — 99211 OFF/OP EST MAY X REQ PHY/QHP: CPT

## 2021-01-01 PROCEDURE — 77417 THER RADIOLOGY PORT IMAGE(S): CPT

## 2021-01-01 PROCEDURE — 36600 WITHDRAWAL OF ARTERIAL BLOOD: CPT

## 2021-01-01 PROCEDURE — 71046 X-RAY EXAM CHEST 2 VIEWS: CPT

## 2021-01-01 PROCEDURE — 93005 ELECTROCARDIOGRAM TRACING: CPT | Performed by: EMERGENCY MEDICINE

## 2021-01-01 PROCEDURE — 2709999900 HC NON-CHARGEABLE SUPPLY: Performed by: INTERNAL MEDICINE

## 2021-01-01 PROCEDURE — 85025 COMPLETE CBC W/AUTO DIFF WBC: CPT

## 2021-01-01 PROCEDURE — 83880 ASSAY OF NATRIURETIC PEPTIDE: CPT

## 2021-01-01 PROCEDURE — 36415 COLL VENOUS BLD VENIPUNCTURE: CPT

## 2021-01-01 PROCEDURE — 74011250637 HC RX REV CODE- 250/637: Performed by: NURSE PRACTITIONER

## 2021-01-01 PROCEDURE — 77412 RADIATION TX DELIVERY LVL 3: CPT

## 2021-01-01 PROCEDURE — 99223 1ST HOSP IP/OBS HIGH 75: CPT | Performed by: SURGERY

## 2021-01-01 PROCEDURE — 77030012341 HC CHMB SPCR OPTC MDI VYRM -A

## 2021-01-01 PROCEDURE — 99282 EMERGENCY DEPT VISIT SF MDM: CPT

## 2021-01-01 PROCEDURE — 94760 N-INVAS EAR/PLS OXIMETRY 1: CPT

## 2021-01-01 PROCEDURE — D0Y07ZZ CONTACT RADIATION OF BRAIN: ICD-10-PCS | Performed by: INTERNAL MEDICINE

## 2021-01-01 PROCEDURE — 99232 SBSQ HOSP IP/OBS MODERATE 35: CPT | Performed by: INTERNAL MEDICINE

## 2021-01-01 PROCEDURE — 99218 HC RM OBSERVATION: CPT

## 2021-01-01 PROCEDURE — 36573 INSJ PICC RS&I 5 YR+: CPT | Performed by: NURSE PRACTITIONER

## 2021-01-01 PROCEDURE — 0W9930Z DRAINAGE OF RIGHT PLEURAL CAVITY WITH DRAINAGE DEVICE, PERCUTANEOUS APPROACH: ICD-10-PCS | Performed by: RADIOLOGY

## 2021-01-01 PROCEDURE — 76040000025: Performed by: INTERNAL MEDICINE

## 2021-01-01 PROCEDURE — 74011250636 HC RX REV CODE- 250/636: Performed by: INTERNAL MEDICINE

## 2021-01-01 PROCEDURE — 82962 GLUCOSE BLOOD TEST: CPT

## 2021-01-01 PROCEDURE — G0299 HHS/HOSPICE OF RN EA 15 MIN: HCPCS

## 2021-01-01 PROCEDURE — 32555 ASPIRATE PLEURA W/ IMAGING: CPT | Performed by: INTERNAL MEDICINE

## 2021-01-01 PROCEDURE — 89050 BODY FLUID CELL COUNT: CPT

## 2021-01-01 PROCEDURE — 99223 1ST HOSP IP/OBS HIGH 75: CPT | Performed by: INTERNAL MEDICINE

## 2021-01-01 PROCEDURE — APPSS45 APP SPLIT SHARED TIME 31-45 MINUTES: Performed by: NURSE PRACTITIONER

## 2021-01-01 PROCEDURE — 74011250636 HC RX REV CODE- 250/636: Performed by: NURSE PRACTITIONER

## 2021-01-01 PROCEDURE — 76604 US EXAM CHEST: CPT | Performed by: INTERNAL MEDICINE

## 2021-01-01 PROCEDURE — 83550 IRON BINDING TEST: CPT

## 2021-01-01 PROCEDURE — 77336 RADIATION PHYSICS CONSULT: CPT

## 2021-01-01 PROCEDURE — 74011250637 HC RX REV CODE- 250/637: Performed by: HOSPITALIST

## 2021-01-01 PROCEDURE — 87102 FUNGUS ISOLATION CULTURE: CPT

## 2021-01-01 PROCEDURE — 77334 RADIATION TREATMENT AID(S): CPT

## 2021-01-01 PROCEDURE — APPSS180 APP SPLIT SHARED TIME > 60 MINUTES: Performed by: NURSE PRACTITIONER

## 2021-01-01 PROCEDURE — 83735 ASSAY OF MAGNESIUM: CPT

## 2021-01-01 PROCEDURE — 88305 TISSUE EXAM BY PATHOLOGIST: CPT

## 2021-01-01 PROCEDURE — 92950 HEART/LUNG RESUSCITATION CPR: CPT

## 2021-01-01 PROCEDURE — G0151 HHCP-SERV OF PT,EA 15 MIN: HCPCS

## 2021-01-01 PROCEDURE — 88112 CYTOPATH CELL ENHANCE TECH: CPT

## 2021-01-01 PROCEDURE — 71250 CT THORAX DX C-: CPT

## 2021-01-01 PROCEDURE — 75810000455 HC PLCMT CENT VENOUS CATH LVL 2 5182

## 2021-01-01 PROCEDURE — 97165 OT EVAL LOW COMPLEX 30 MIN: CPT

## 2021-01-01 PROCEDURE — 74011000258 HC RX REV CODE- 258: Performed by: INTERNAL MEDICINE

## 2021-01-01 PROCEDURE — 86704 HEP B CORE ANTIBODY TOTAL: CPT

## 2021-01-01 PROCEDURE — 83605 ASSAY OF LACTIC ACID: CPT

## 2021-01-01 PROCEDURE — 3E04305 INTRODUCTION OF OTHER ANTINEOPLASTIC INTO CENTRAL VEIN, PERCUTANEOUS APPROACH: ICD-10-PCS | Performed by: INTERNAL MEDICINE

## 2021-01-01 PROCEDURE — 74011250636 HC RX REV CODE- 250/636: Performed by: EMERGENCY MEDICINE

## 2021-01-01 PROCEDURE — 75810000275 HC EMERGENCY DEPT VISIT NO LEVEL OF CARE

## 2021-01-01 PROCEDURE — 84443 ASSAY THYROID STIM HORMONE: CPT

## 2021-01-01 PROCEDURE — 80048 BASIC METABOLIC PNL TOTAL CA: CPT

## 2021-01-01 PROCEDURE — 400013 HH SOC

## 2021-01-01 PROCEDURE — 74011250637 HC RX REV CODE- 250/637: Performed by: INTERNAL MEDICINE

## 2021-01-01 PROCEDURE — 77290 THER RAD SIMULAJ FIELD CPLX: CPT

## 2021-01-01 PROCEDURE — 74011000636 HC RX REV CODE- 636: Performed by: INTERNAL MEDICINE

## 2021-01-01 PROCEDURE — 82803 BLOOD GASES ANY COMBINATION: CPT

## 2021-01-01 PROCEDURE — 74011250636 HC RX REV CODE- 250/636: Performed by: HOSPITALIST

## 2021-01-01 PROCEDURE — 84484 ASSAY OF TROPONIN QUANT: CPT

## 2021-01-01 PROCEDURE — 94640 AIRWAY INHALATION TREATMENT: CPT

## 2021-01-01 PROCEDURE — 31500 INSERT EMERGENCY AIRWAY: CPT

## 2021-01-01 PROCEDURE — 74011250637 HC RX REV CODE- 250/637: Performed by: FAMILY MEDICINE

## 2021-01-01 PROCEDURE — 0BH17EZ INSERTION OF ENDOTRACHEAL AIRWAY INTO TRACHEA, VIA NATURAL OR ARTIFICIAL OPENING: ICD-10-PCS | Performed by: ANESTHESIOLOGY

## 2021-01-01 PROCEDURE — 65270000029 HC RM PRIVATE

## 2021-01-01 PROCEDURE — 74011000636 HC RX REV CODE- 636: Performed by: EMERGENCY MEDICINE

## 2021-01-01 PROCEDURE — 77300 RADIATION THERAPY DOSE PLAN: CPT

## 2021-01-01 PROCEDURE — 74011000258 HC RX REV CODE- 258: Performed by: FAMILY MEDICINE

## 2021-01-01 PROCEDURE — G0157 HHC PT ASSISTANT EA 15: HCPCS

## 2021-01-01 PROCEDURE — 74011250636 HC RX REV CODE- 250/636

## 2021-01-01 PROCEDURE — 70553 MRI BRAIN STEM W/O & W/DYE: CPT

## 2021-01-01 PROCEDURE — 87635 SARS-COV-2 COVID-19 AMP PRB: CPT

## 2021-01-01 PROCEDURE — 74011250636 HC RX REV CODE- 250/636: Performed by: FAMILY MEDICINE

## 2021-01-01 PROCEDURE — 99204 OFFICE O/P NEW MOD 45 MIN: CPT | Performed by: INTERNAL MEDICINE

## 2021-01-01 PROCEDURE — 85610 PROTHROMBIN TIME: CPT

## 2021-01-01 PROCEDURE — 75810000165 HC THORACENTESIS

## 2021-01-01 PROCEDURE — 96366 THER/PROPH/DIAG IV INF ADDON: CPT

## 2021-01-01 PROCEDURE — 81003 URINALYSIS AUTO W/O SCOPE: CPT

## 2021-01-01 PROCEDURE — 51702 INSERT TEMP BLADDER CATH: CPT

## 2021-01-01 PROCEDURE — 88341 IMHCHEM/IMCYTCHM EA ADD ANTB: CPT

## 2021-01-01 PROCEDURE — APPSS30 APP SPLIT SHARED TIME 16-30 MINUTES: Performed by: NURSE PRACTITIONER

## 2021-01-01 PROCEDURE — 87116 MYCOBACTERIA CULTURE: CPT

## 2021-01-01 PROCEDURE — 2709999900 HC NON-CHARGEABLE SUPPLY

## 2021-01-01 PROCEDURE — 77280 THER RAD SIMULAJ FIELD SMPL: CPT

## 2021-01-01 PROCEDURE — 96376 TX/PRO/DX INJ SAME DRUG ADON: CPT

## 2021-01-01 PROCEDURE — U0003 INFECTIOUS AGENT DETECTION BY NUCLEIC ACID (DNA OR RNA); SEVERE ACUTE RESPIRATORY SYNDROME CORONAVIRUS 2 (SARS-COV-2) (CORONAVIRUS DISEASE [COVID-19]), AMPLIFIED PROBE TECHNIQUE, MAKING USE OF HIGH THROUGHPUT TECHNOLOGIES AS DESCRIBED BY CMS-2020-01-R: HCPCS

## 2021-01-01 PROCEDURE — 82565 ASSAY OF CREATININE: CPT

## 2021-01-01 PROCEDURE — 65610000001 HC ROOM ICU GENERAL

## 2021-01-01 PROCEDURE — 87040 BLOOD CULTURE FOR BACTERIA: CPT

## 2021-01-01 PROCEDURE — 82945 GLUCOSE OTHER FLUID: CPT

## 2021-01-01 PROCEDURE — 32555 ASPIRATE PLEURA W/ IMAGING: CPT

## 2021-01-01 PROCEDURE — 82784 ASSAY IGA/IGD/IGG/IGM EACH: CPT

## 2021-01-01 PROCEDURE — C1751 CATH, INF, PER/CENT/MIDLINE: HCPCS

## 2021-01-01 PROCEDURE — 94002 VENT MGMT INPAT INIT DAY: CPT

## 2021-01-01 PROCEDURE — 86317 IMMUNOASSAY INFECTIOUS AGENT: CPT

## 2021-01-01 PROCEDURE — 74011250636 HC RX REV CODE- 250/636: Performed by: RADIOLOGY

## 2021-01-01 PROCEDURE — 77010033678 HC OXYGEN DAILY

## 2021-01-01 PROCEDURE — 400018 HH-NO PAY CLAIM PROCEDURE

## 2021-01-01 PROCEDURE — 96374 THER/PROPH/DIAG INJ IV PUSH: CPT

## 2021-01-01 PROCEDURE — G0155 HHCP-SVS OF CSW,EA 15 MIN: HCPCS

## 2021-01-01 PROCEDURE — 82728 ASSAY OF FERRITIN: CPT

## 2021-01-01 PROCEDURE — 87205 SMEAR GRAM STAIN: CPT

## 2021-01-01 PROCEDURE — 83615 LACTATE (LD) (LDH) ENZYME: CPT

## 2021-01-01 PROCEDURE — 86580 TB INTRADERMAL TEST: CPT | Performed by: INTERNAL MEDICINE

## 2021-01-01 PROCEDURE — 74011000250 HC RX REV CODE- 250: Performed by: PHYSICIAN ASSISTANT

## 2021-01-01 PROCEDURE — 77295 3-D RADIOTHERAPY PLAN: CPT

## 2021-01-01 PROCEDURE — A9552 F18 FDG: HCPCS

## 2021-01-01 PROCEDURE — 96361 HYDRATE IV INFUSION ADD-ON: CPT

## 2021-01-01 PROCEDURE — 99205 OFFICE O/P NEW HI 60 MIN: CPT | Performed by: INTERNAL MEDICINE

## 2021-01-01 PROCEDURE — 99239 HOSP IP/OBS DSCHRG MGMT >30: CPT | Performed by: INTERNAL MEDICINE

## 2021-01-01 PROCEDURE — 76040000019: Performed by: INTERNAL MEDICINE

## 2021-01-01 PROCEDURE — 76450000000

## 2021-01-01 PROCEDURE — 02HV33Z INSERTION OF INFUSION DEVICE INTO SUPERIOR VENA CAVA, PERCUTANEOUS APPROACH: ICD-10-PCS | Performed by: ANESTHESIOLOGY

## 2021-01-01 PROCEDURE — 88360 TUMOR IMMUNOHISTOCHEM/MANUAL: CPT

## 2021-01-01 PROCEDURE — 96372 THER/PROPH/DIAG INJ SC/IM: CPT

## 2021-01-01 PROCEDURE — 80074 ACUTE HEPATITIS PANEL: CPT

## 2021-01-01 PROCEDURE — 99213 OFFICE O/P EST LOW 20 MIN: CPT | Performed by: INTERNAL MEDICINE

## 2021-01-01 PROCEDURE — 96365 THER/PROPH/DIAG IV INF INIT: CPT

## 2021-01-01 PROCEDURE — 77293 RESPIRATOR MOTION MGMT SIMUL: CPT

## 2021-01-01 PROCEDURE — 84157 ASSAY OF PROTEIN OTHER: CPT

## 2021-01-01 PROCEDURE — 38505 NEEDLE BIOPSY LYMPH NODES: CPT

## 2021-01-01 PROCEDURE — 96375 TX/PRO/DX INJ NEW DRUG ADDON: CPT

## 2021-01-01 PROCEDURE — 85730 THROMBOPLASTIN TIME PARTIAL: CPT

## 2021-01-01 PROCEDURE — A9576 INJ PROHANCE MULTIPACK: HCPCS | Performed by: RADIOLOGY

## 2021-01-01 PROCEDURE — APPSS60 APP SPLIT SHARED TIME 46-60 MINUTES: Performed by: NURSE PRACTITIONER

## 2021-01-01 PROCEDURE — 71260 CT THORAX DX C+: CPT

## 2021-01-01 PROCEDURE — 74011000250 HC RX REV CODE- 250: Performed by: FAMILY MEDICINE

## 2021-01-01 PROCEDURE — 77030003503 HC NDL BIOP TISS BD -B

## 2021-01-01 PROCEDURE — 85379 FIBRIN DEGRADATION QUANT: CPT

## 2021-01-01 PROCEDURE — 84145 PROCALCITONIN (PCT): CPT

## 2021-01-01 PROCEDURE — APPNB60 APP NON BILLABLE TIME 46-60 MINS: Performed by: NURSE PRACTITIONER

## 2021-01-01 PROCEDURE — 97535 SELF CARE MNGMENT TRAINING: CPT

## 2021-01-01 PROCEDURE — 99223 1ST HOSP IP/OBS HIGH 75: CPT | Performed by: NURSE PRACTITIONER

## 2021-01-01 PROCEDURE — 5A1935Z RESPIRATORY VENTILATION, LESS THAN 24 CONSECUTIVE HOURS: ICD-10-PCS | Performed by: ANESTHESIOLOGY

## 2021-01-01 PROCEDURE — 74177 CT ABD & PELVIS W/CONTRAST: CPT

## 2021-01-01 PROCEDURE — 77030014147 HC TY THORCENT PARA TELE -B: Performed by: INTERNAL MEDICINE

## 2021-01-01 PROCEDURE — 99284 EMERGENCY DEPT VISIT MOD MDM: CPT

## 2021-01-01 PROCEDURE — 74011000250 HC RX REV CODE- 250: Performed by: INTERNAL MEDICINE

## 2021-01-01 PROCEDURE — 74011000258 HC RX REV CODE- 258: Performed by: EMERGENCY MEDICINE

## 2021-01-01 RX ORDER — ASPIRIN 81 MG/1
81 TABLET ORAL DAILY
COMMUNITY

## 2021-01-01 RX ORDER — DEXAMETHASONE 4 MG/1
4 TABLET ORAL EVERY 6 HOURS
Status: DISCONTINUED | OUTPATIENT
Start: 2021-01-01 | End: 2021-01-01 | Stop reason: HOSPADM

## 2021-01-01 RX ORDER — HYDROCODONE BITARTRATE AND ACETAMINOPHEN 10; 325 MG/1; MG/1
1 TABLET ORAL
Status: DISCONTINUED | OUTPATIENT
Start: 2021-01-01 | End: 2021-01-01 | Stop reason: HOSPADM

## 2021-01-01 RX ORDER — SODIUM CHLORIDE 9 MG/ML
10 INJECTION INTRAMUSCULAR; INTRAVENOUS; SUBCUTANEOUS AS NEEDED
Status: CANCELLED | OUTPATIENT
Start: 2021-01-01

## 2021-01-01 RX ORDER — SODIUM CHLORIDE 9 MG/ML
25 INJECTION, SOLUTION INTRAVENOUS CONTINUOUS
Status: DISPENSED | OUTPATIENT
Start: 2021-01-01 | End: 2021-01-01

## 2021-01-01 RX ORDER — ONDANSETRON 2 MG/ML
4 INJECTION INTRAMUSCULAR; INTRAVENOUS
Status: DISCONTINUED | OUTPATIENT
Start: 2021-01-01 | End: 2021-01-01 | Stop reason: HOSPADM

## 2021-01-01 RX ORDER — PROCHLORPERAZINE MALEATE 10 MG
10 TABLET ORAL
Status: DISCONTINUED | OUTPATIENT
Start: 2021-01-01 | End: 2021-01-01

## 2021-01-01 RX ORDER — AMLODIPINE BESYLATE 5 MG/1
5 TABLET ORAL DAILY
Status: DISCONTINUED | OUTPATIENT
Start: 2021-01-01 | End: 2021-01-01 | Stop reason: HOSPADM

## 2021-01-01 RX ORDER — METOPROLOL TARTRATE 25 MG/1
12.5 TABLET, FILM COATED ORAL 2 TIMES DAILY
Qty: 30 TABLET | Refills: 1 | Status: SHIPPED | OUTPATIENT
Start: 2021-01-01

## 2021-01-01 RX ORDER — SODIUM CHLORIDE 0.9 % (FLUSH) 0.9 %
10 SYRINGE (ML) INJECTION AS NEEDED
Status: ACTIVE | OUTPATIENT
Start: 2021-01-01 | End: 2021-01-01

## 2021-01-01 RX ORDER — ASPIRIN 81 MG/1
81 TABLET ORAL DAILY
Status: DISCONTINUED | OUTPATIENT
Start: 2021-01-01 | End: 2021-01-01 | Stop reason: HOSPADM

## 2021-01-01 RX ORDER — MORPHINE SULFATE 30 MG/1
30 TABLET, FILM COATED, EXTENDED RELEASE ORAL EVERY 8 HOURS
Qty: 90 TABLET | Refills: 0 | Status: SHIPPED | OUTPATIENT
Start: 2021-01-01 | End: 2021-11-21

## 2021-01-01 RX ORDER — ENOXAPARIN SODIUM 100 MG/ML
40 INJECTION SUBCUTANEOUS EVERY 24 HOURS
Status: DISCONTINUED | OUTPATIENT
Start: 2021-01-01 | End: 2021-01-01 | Stop reason: HOSPADM

## 2021-01-01 RX ORDER — EPINEPHRINE 1 MG/ML
0.3 INJECTION, SOLUTION, CONCENTRATE INTRAVENOUS AS NEEDED
Status: CANCELLED | OUTPATIENT
Start: 2021-01-01

## 2021-01-01 RX ORDER — HEPARIN 100 UNIT/ML
300-500 SYRINGE INTRAVENOUS AS NEEDED
Status: ACTIVE | OUTPATIENT
Start: 2021-01-01 | End: 2021-01-01

## 2021-01-01 RX ORDER — SODIUM CHLORIDE 9 MG/ML
25 INJECTION, SOLUTION INTRAVENOUS CONTINUOUS
Status: CANCELLED | OUTPATIENT
Start: 2021-01-01

## 2021-01-01 RX ORDER — FLUTICASONE PROPIONATE 50 MCG
2 SPRAY, SUSPENSION (ML) NASAL DAILY
Status: DISCONTINUED | OUTPATIENT
Start: 2021-01-01 | End: 2021-01-01 | Stop reason: HOSPADM

## 2021-01-01 RX ORDER — ALBUTEROL SULFATE 0.83 MG/ML
2.5 SOLUTION RESPIRATORY (INHALATION) AS NEEDED
Status: CANCELLED
Start: 2021-01-01

## 2021-01-01 RX ORDER — ONDANSETRON 2 MG/ML
8 INJECTION INTRAMUSCULAR; INTRAVENOUS ONCE
Status: COMPLETED | OUTPATIENT
Start: 2021-01-01 | End: 2021-01-01

## 2021-01-01 RX ORDER — ONDANSETRON 2 MG/ML
8 INJECTION INTRAMUSCULAR; INTRAVENOUS AS NEEDED
Status: CANCELLED | OUTPATIENT
Start: 2021-01-01

## 2021-01-01 RX ORDER — METOPROLOL TARTRATE 25 MG/1
12.5 TABLET, FILM COATED ORAL EVERY 12 HOURS
Status: DISCONTINUED | OUTPATIENT
Start: 2021-01-01 | End: 2021-01-01 | Stop reason: HOSPADM

## 2021-01-01 RX ORDER — LORAZEPAM 2 MG/ML
0.5 INJECTION INTRAMUSCULAR
Status: CANCELLED | OUTPATIENT
Start: 2021-01-01

## 2021-01-01 RX ORDER — HYDROCORTISONE SODIUM SUCCINATE 100 MG/2ML
100 INJECTION, POWDER, FOR SOLUTION INTRAMUSCULAR; INTRAVENOUS AS NEEDED
Status: CANCELLED | OUTPATIENT
Start: 2021-01-01

## 2021-01-01 RX ORDER — MORPHINE SULFATE 30 MG/1
30 TABLET, FILM COATED, EXTENDED RELEASE ORAL EVERY 8 HOURS
Status: DISCONTINUED | OUTPATIENT
Start: 2021-01-01 | End: 2021-01-01 | Stop reason: HOSPADM

## 2021-01-01 RX ORDER — MORPHINE SULFATE 4 MG/ML
4 INJECTION INTRAVENOUS
Status: DISCONTINUED | OUTPATIENT
Start: 2021-01-01 | End: 2021-01-01 | Stop reason: HOSPADM

## 2021-01-01 RX ORDER — DIPHENHYDRAMINE HYDROCHLORIDE 50 MG/ML
50 INJECTION, SOLUTION INTRAMUSCULAR; INTRAVENOUS AS NEEDED
Status: CANCELLED
Start: 2021-01-01

## 2021-01-01 RX ORDER — HYDROCODONE BITARTRATE AND ACETAMINOPHEN 10; 325 MG/1; MG/1
1 TABLET ORAL
Qty: 90 TABLET | Refills: 0 | Status: SHIPPED | OUTPATIENT
Start: 2021-01-01 | End: 2021-11-21

## 2021-01-01 RX ORDER — HYDROCODONE BITARTRATE AND ACETAMINOPHEN 5; 325 MG/1; MG/1
1 TABLET ORAL
Status: DISCONTINUED | OUTPATIENT
Start: 2021-01-01 | End: 2021-01-01 | Stop reason: HOSPADM

## 2021-01-01 RX ORDER — NOREPINEPHRINE BIT/0.9 % NACL 4MG/250ML
.5-16 PLASTIC BAG, INJECTION (ML) INTRAVENOUS
Status: DISCONTINUED | OUTPATIENT
Start: 2021-01-01 | End: 2021-01-01

## 2021-01-01 RX ORDER — MIRTAZAPINE 15 MG/1
15 TABLET, FILM COATED ORAL
Status: DISCONTINUED | OUTPATIENT
Start: 2021-01-01 | End: 2021-01-01 | Stop reason: HOSPADM

## 2021-01-01 RX ORDER — FACIAL-BODY WIPES
10 EACH TOPICAL DAILY PRN
Status: DISCONTINUED | OUTPATIENT
Start: 2021-01-01 | End: 2021-01-01 | Stop reason: HOSPADM

## 2021-01-01 RX ORDER — AMLODIPINE BESYLATE 5 MG/1
5 TABLET ORAL DAILY
Qty: 90 TABLET | Refills: 0 | Status: SHIPPED | OUTPATIENT
Start: 2021-01-01 | End: 2021-01-01

## 2021-01-01 RX ORDER — ACETAMINOPHEN 325 MG/1
650 TABLET ORAL AS NEEDED
Status: CANCELLED
Start: 2021-01-01

## 2021-01-01 RX ORDER — SODIUM CHLORIDE 0.9 % (FLUSH) 0.9 %
5-40 SYRINGE (ML) INJECTION EVERY 8 HOURS
Status: DISCONTINUED | OUTPATIENT
Start: 2021-01-01 | End: 2021-01-01 | Stop reason: HOSPADM

## 2021-01-01 RX ORDER — POLYETHYLENE GLYCOL 3350 17 G/17G
17 POWDER, FOR SOLUTION ORAL DAILY PRN
Status: DISCONTINUED | OUTPATIENT
Start: 2021-01-01 | End: 2021-01-01 | Stop reason: HOSPADM

## 2021-01-01 RX ORDER — SODIUM CHLORIDE 0.9 % (FLUSH) 0.9 %
10 SYRINGE (ML) INJECTION
Status: COMPLETED | OUTPATIENT
Start: 2021-01-01 | End: 2021-01-01

## 2021-01-01 RX ORDER — LANOLIN ALCOHOL/MO/W.PET/CERES
400 CREAM (GRAM) TOPICAL 3 TIMES DAILY
Status: DISCONTINUED | OUTPATIENT
Start: 2021-01-01 | End: 2021-01-01 | Stop reason: HOSPADM

## 2021-01-01 RX ORDER — GABAPENTIN 300 MG/1
300 CAPSULE ORAL 3 TIMES DAILY
Status: DISCONTINUED | OUTPATIENT
Start: 2021-01-01 | End: 2021-01-01 | Stop reason: HOSPADM

## 2021-01-01 RX ORDER — NALOXONE HYDROCHLORIDE 0.4 MG/ML
0.4 INJECTION, SOLUTION INTRAMUSCULAR; INTRAVENOUS; SUBCUTANEOUS AS NEEDED
Status: DISCONTINUED | OUTPATIENT
Start: 2021-01-01 | End: 2021-01-01 | Stop reason: HOSPADM

## 2021-01-01 RX ORDER — BUDESONIDE AND FORMOTEROL FUMARATE DIHYDRATE 80; 4.5 UG/1; UG/1
2 AEROSOL RESPIRATORY (INHALATION)
Status: DISCONTINUED | OUTPATIENT
Start: 2021-01-01 | End: 2021-01-01 | Stop reason: HOSPADM

## 2021-01-01 RX ORDER — SODIUM CHLORIDE AND POTASSIUM CHLORIDE .9; .15 G/100ML; G/100ML
SOLUTION INTRAVENOUS CONTINUOUS
Status: DISPENSED | OUTPATIENT
Start: 2021-01-01 | End: 2021-01-01

## 2021-01-01 RX ORDER — ONDANSETRON 4 MG/1
4 TABLET, FILM COATED ORAL
COMMUNITY

## 2021-01-01 RX ORDER — ONDANSETRON 4 MG/1
4 TABLET, ORALLY DISINTEGRATING ORAL
Status: DISCONTINUED | OUTPATIENT
Start: 2021-01-01 | End: 2021-10-30 | Stop reason: HOSPADM

## 2021-01-01 RX ORDER — NOREPINEPHRINE BITARTRATE/D5W 4MG/250ML
.5-16 PLASTIC BAG, INJECTION (ML) INTRAVENOUS
Status: DISCONTINUED | OUTPATIENT
Start: 2021-01-01 | End: 2021-01-01 | Stop reason: SDUPTHER

## 2021-01-01 RX ORDER — DEXAMETHASONE 4 MG/1
4 TABLET ORAL EVERY 6 HOURS
Qty: 120 TABLET | Refills: 0 | Status: SHIPPED | OUTPATIENT
Start: 2021-01-01

## 2021-01-01 RX ORDER — SODIUM CHLORIDE 0.9 % (FLUSH) 0.9 %
5-40 SYRINGE (ML) INJECTION EVERY 8 HOURS
Status: DISCONTINUED | OUTPATIENT
Start: 2021-01-01 | End: 2021-10-30 | Stop reason: HOSPADM

## 2021-01-01 RX ORDER — ONDANSETRON 2 MG/ML
4 INJECTION INTRAMUSCULAR; INTRAVENOUS
Status: COMPLETED | OUTPATIENT
Start: 2021-01-01 | End: 2021-01-01

## 2021-01-01 RX ORDER — ENOXAPARIN SODIUM 100 MG/ML
40 INJECTION SUBCUTANEOUS DAILY
Status: CANCELLED | OUTPATIENT
Start: 2021-01-01

## 2021-01-01 RX ORDER — MAG HYDROX/ALUMINUM HYD/SIMETH 200-200-20
15 SUSPENSION, ORAL (FINAL DOSE FORM) ORAL
Status: DISCONTINUED | OUTPATIENT
Start: 2021-01-01 | End: 2021-01-01 | Stop reason: HOSPADM

## 2021-01-01 RX ORDER — MIDAZOLAM HYDROCHLORIDE 1 MG/ML
5 INJECTION, SOLUTION INTRAMUSCULAR; INTRAVENOUS ONCE
Status: COMPLETED | OUTPATIENT
Start: 2021-01-01 | End: 2021-01-01

## 2021-01-01 RX ORDER — HEPARIN 100 UNIT/ML
300-500 SYRINGE INTRAVENOUS AS NEEDED
Status: CANCELLED
Start: 2021-01-01

## 2021-01-01 RX ORDER — SODIUM CHLORIDE 0.9 % (FLUSH) 0.9 %
20 SYRINGE (ML) INJECTION AS NEEDED
Status: DISCONTINUED | OUTPATIENT
Start: 2021-01-01 | End: 2021-01-01 | Stop reason: HOSPADM

## 2021-01-01 RX ORDER — ONDANSETRON 2 MG/ML
4 INJECTION INTRAMUSCULAR; INTRAVENOUS
Status: DISCONTINUED | OUTPATIENT
Start: 2021-01-01 | End: 2021-10-30 | Stop reason: HOSPADM

## 2021-01-01 RX ORDER — MORPHINE SULFATE 15 MG/1
15 TABLET, FILM COATED, EXTENDED RELEASE ORAL EVERY 12 HOURS
Status: DISCONTINUED | OUTPATIENT
Start: 2021-01-01 | End: 2021-01-01 | Stop reason: HOSPADM

## 2021-01-01 RX ORDER — DIPHENHYDRAMINE HYDROCHLORIDE 50 MG/ML
25 INJECTION, SOLUTION INTRAMUSCULAR; INTRAVENOUS AS NEEDED
Status: CANCELLED
Start: 2021-01-01

## 2021-01-01 RX ORDER — PANTOPRAZOLE SODIUM 40 MG/1
40 TABLET, DELAYED RELEASE ORAL DAILY
Status: DISCONTINUED | OUTPATIENT
Start: 2021-01-01 | End: 2021-01-01 | Stop reason: HOSPADM

## 2021-01-01 RX ORDER — DEXAMETHASONE SODIUM PHOSPHATE 4 MG/ML
8 INJECTION, SOLUTION INTRA-ARTICULAR; INTRALESIONAL; INTRAMUSCULAR; INTRAVENOUS; SOFT TISSUE ONCE
Status: COMPLETED | OUTPATIENT
Start: 2021-01-01 | End: 2021-01-01

## 2021-01-01 RX ORDER — AMLODIPINE BESYLATE 10 MG/1
10 TABLET ORAL DAILY
Status: DISCONTINUED | OUTPATIENT
Start: 2021-01-01 | End: 2021-01-01 | Stop reason: DRUGHIGH

## 2021-01-01 RX ORDER — POTASSIUM CHLORIDE 20 MEQ/1
20 TABLET, EXTENDED RELEASE ORAL DAILY
Status: DISCONTINUED | OUTPATIENT
Start: 2021-01-01 | End: 2021-01-01

## 2021-01-01 RX ORDER — BACLOFEN 5 MG/1
5 TABLET ORAL
Qty: 30 TABLET | Refills: 0 | Status: SHIPPED | OUTPATIENT
Start: 2021-01-01

## 2021-01-01 RX ORDER — DEXAMETHASONE SODIUM PHOSPHATE 4 MG/ML
8 INJECTION, SOLUTION INTRA-ARTICULAR; INTRALESIONAL; INTRAMUSCULAR; INTRAVENOUS; SOFT TISSUE ONCE
Status: CANCELLED | OUTPATIENT
Start: 2021-01-01 | End: 2021-01-01

## 2021-01-01 RX ORDER — NOREPINEPHRINE BIT/0.9 % NACL 4MG/250ML
PLASTIC BAG, INJECTION (ML) INTRAVENOUS
Status: COMPLETED
Start: 2021-01-01 | End: 2021-01-01

## 2021-01-01 RX ORDER — SODIUM CHLORIDE 9 MG/ML
150 INJECTION, SOLUTION INTRAVENOUS CONTINUOUS
Status: DISCONTINUED | OUTPATIENT
Start: 2021-01-01 | End: 2021-10-30 | Stop reason: HOSPADM

## 2021-01-01 RX ORDER — FUROSEMIDE 20 MG/1
20 TABLET ORAL DAILY
Status: DISCONTINUED | OUTPATIENT
Start: 2021-01-01 | End: 2021-01-01 | Stop reason: HOSPADM

## 2021-01-01 RX ORDER — SODIUM CHLORIDE 0.9 % (FLUSH) 0.9 %
20 SYRINGE (ML) INJECTION DAILY
Status: DISCONTINUED | OUTPATIENT
Start: 2021-01-01 | End: 2021-01-01 | Stop reason: HOSPADM

## 2021-01-01 RX ORDER — FAMOTIDINE 20 MG/1
20 TABLET, FILM COATED ORAL
Status: DISCONTINUED | OUTPATIENT
Start: 2021-01-01 | End: 2021-01-01 | Stop reason: HOSPADM

## 2021-01-01 RX ORDER — ONDANSETRON 2 MG/ML
8 INJECTION INTRAMUSCULAR; INTRAVENOUS ONCE
Status: CANCELLED | OUTPATIENT
Start: 2021-01-01 | End: 2021-01-01

## 2021-01-01 RX ORDER — GABAPENTIN 300 MG/1
300 CAPSULE ORAL 3 TIMES DAILY
Status: DISCONTINUED | OUTPATIENT
Start: 2021-01-01 | End: 2021-01-01

## 2021-01-01 RX ORDER — MAGNESIUM SULFATE HEPTAHYDRATE 40 MG/ML
2 INJECTION, SOLUTION INTRAVENOUS ONCE
Status: COMPLETED | OUTPATIENT
Start: 2021-01-01 | End: 2021-01-01

## 2021-01-01 RX ORDER — DOXYCYCLINE 100 MG/1
100 CAPSULE ORAL 2 TIMES DAILY
COMMUNITY
End: 2021-01-01

## 2021-01-01 RX ORDER — METOPROLOL TARTRATE 25 MG/1
12.5 TABLET, FILM COATED ORAL 2 TIMES DAILY
Status: DISCONTINUED | OUTPATIENT
Start: 2021-01-01 | End: 2021-01-01 | Stop reason: HOSPADM

## 2021-01-01 RX ORDER — POLYETHYLENE GLYCOL 3350 17 G/17G
17 POWDER, FOR SOLUTION ORAL
Status: DISCONTINUED | OUTPATIENT
Start: 2021-01-01 | End: 2021-01-01 | Stop reason: HOSPADM

## 2021-01-01 RX ORDER — HYDROCODONE BITARTRATE AND ACETAMINOPHEN 10; 325 MG/1; MG/1
1 TABLET ORAL EVERY 4 HOURS
Status: DISCONTINUED | OUTPATIENT
Start: 2021-01-01 | End: 2021-01-01 | Stop reason: HOSPADM

## 2021-01-01 RX ORDER — ACETAMINOPHEN 650 MG/1
650 SUPPOSITORY RECTAL
Status: DISCONTINUED | OUTPATIENT
Start: 2021-01-01 | End: 2021-01-01 | Stop reason: HOSPADM

## 2021-01-01 RX ORDER — SODIUM CHLORIDE 9 MG/ML
10 INJECTION INTRAMUSCULAR; INTRAVENOUS; SUBCUTANEOUS AS NEEDED
Status: ACTIVE | OUTPATIENT
Start: 2021-01-01 | End: 2021-01-01

## 2021-01-01 RX ORDER — ACETAMINOPHEN 325 MG/1
650 TABLET ORAL
Status: DISCONTINUED | OUTPATIENT
Start: 2021-01-01 | End: 2021-01-01 | Stop reason: HOSPADM

## 2021-01-01 RX ORDER — ONDANSETRON 4 MG/1
4 TABLET, ORALLY DISINTEGRATING ORAL
Status: DISCONTINUED | OUTPATIENT
Start: 2021-01-01 | End: 2021-01-01 | Stop reason: HOSPADM

## 2021-01-01 RX ORDER — LANOLIN ALCOHOL/MO/W.PET/CERES
400 CREAM (GRAM) TOPICAL 4 TIMES DAILY
Qty: 120 TABLET | Refills: 0 | Status: SHIPPED | OUTPATIENT
Start: 2021-01-01

## 2021-01-01 RX ORDER — POTASSIUM CHLORIDE 20 MEQ/1
20 TABLET, EXTENDED RELEASE ORAL 2 TIMES DAILY
Status: DISCONTINUED | OUTPATIENT
Start: 2021-01-01 | End: 2021-01-01 | Stop reason: HOSPADM

## 2021-01-01 RX ORDER — SODIUM CHLORIDE 0.9 % (FLUSH) 0.9 %
10 SYRINGE (ML) INJECTION AS NEEDED
Status: DISCONTINUED | OUTPATIENT
Start: 2021-01-01 | End: 2021-01-01 | Stop reason: HOSPADM

## 2021-01-01 RX ORDER — LANOLIN ALCOHOL/MO/W.PET/CERES
400 CREAM (GRAM) TOPICAL 2 TIMES DAILY
Status: DISCONTINUED | OUTPATIENT
Start: 2021-01-01 | End: 2021-01-01 | Stop reason: HOSPADM

## 2021-01-01 RX ORDER — HYDROMORPHONE HYDROCHLORIDE 1 MG/ML
1 INJECTION, SOLUTION INTRAMUSCULAR; INTRAVENOUS; SUBCUTANEOUS ONCE
Status: COMPLETED | OUTPATIENT
Start: 2021-01-01 | End: 2021-01-01

## 2021-01-01 RX ORDER — SODIUM CHLORIDE 0.9 % (FLUSH) 0.9 %
10 SYRINGE (ML) INJECTION AS NEEDED
Status: CANCELLED | OUTPATIENT
Start: 2021-01-01

## 2021-01-01 RX ORDER — MORPHINE SULFATE 30 MG/1
30 TABLET, FILM COATED, EXTENDED RELEASE ORAL EVERY 12 HOURS
Status: DISCONTINUED | OUTPATIENT
Start: 2021-01-01 | End: 2021-01-01

## 2021-01-01 RX ORDER — LANOLIN ALCOHOL/MO/W.PET/CERES
400 CREAM (GRAM) TOPICAL 4 TIMES DAILY
Status: DISCONTINUED | OUTPATIENT
Start: 2021-01-01 | End: 2021-01-01

## 2021-01-01 RX ORDER — MORPHINE SULFATE 15 MG/1
15 TABLET, FILM COATED, EXTENDED RELEASE ORAL EVERY 12 HOURS
Status: DISCONTINUED | OUTPATIENT
Start: 2021-01-01 | End: 2021-01-01

## 2021-01-01 RX ORDER — METOPROLOL TARTRATE 25 MG/1
12.5 TABLET, FILM COATED ORAL 2 TIMES DAILY
Qty: 30 TABLET | Refills: 1 | Status: SHIPPED | OUTPATIENT
Start: 2021-01-01 | End: 2021-01-01 | Stop reason: SDUPTHER

## 2021-01-01 RX ORDER — ONDANSETRON 2 MG/ML
4 INJECTION INTRAMUSCULAR; INTRAVENOUS ONCE
Status: COMPLETED | OUTPATIENT
Start: 2021-01-01 | End: 2021-01-01

## 2021-01-01 RX ORDER — AMLODIPINE BESYLATE 10 MG/1
10 TABLET ORAL DAILY
COMMUNITY
End: 2021-01-01

## 2021-01-01 RX ORDER — MORPHINE SULFATE 15 MG/1
15 TABLET, FILM COATED, EXTENDED RELEASE ORAL EVERY 12 HOURS
Status: COMPLETED | OUTPATIENT
Start: 2021-01-01 | End: 2021-01-01

## 2021-01-01 RX ORDER — DEXTROSE, SODIUM CHLORIDE, AND POTASSIUM CHLORIDE 5; .9; .15 G/100ML; G/100ML; G/100ML
75 INJECTION INTRAVENOUS CONTINUOUS
Status: DISCONTINUED | OUTPATIENT
Start: 2021-01-01 | End: 2021-01-01

## 2021-01-01 RX ORDER — HYDROCORTISONE SODIUM SUCCINATE 100 MG/2ML
100 INJECTION, POWDER, FOR SOLUTION INTRAMUSCULAR; INTRAVENOUS AS NEEDED
Status: DISPENSED | OUTPATIENT
Start: 2021-01-01 | End: 2021-01-01

## 2021-01-01 RX ORDER — NOREPINEPHRINE BIT/0.9 % NACL 4MG/250ML
.5-3 PLASTIC BAG, INJECTION (ML) INTRAVENOUS
Status: DISCONTINUED | OUTPATIENT
Start: 2021-01-01 | End: 2021-10-30 | Stop reason: HOSPADM

## 2021-01-01 RX ORDER — LANOLIN ALCOHOL/MO/W.PET/CERES
400 CREAM (GRAM) TOPICAL 2 TIMES DAILY
Status: DISCONTINUED | OUTPATIENT
Start: 2021-01-01 | End: 2021-01-01

## 2021-01-01 RX ORDER — POTASSIUM CHLORIDE 750 MG/1
10 TABLET, EXTENDED RELEASE ORAL DAILY
Status: DISCONTINUED | OUTPATIENT
Start: 2021-01-01 | End: 2021-01-01 | Stop reason: HOSPADM

## 2021-01-01 RX ORDER — LIDOCAINE HYDROCHLORIDE 20 MG/ML
1-10 INJECTION, SOLUTION INFILTRATION; PERINEURAL
Status: DISCONTINUED | OUTPATIENT
Start: 2021-01-01 | End: 2021-01-01 | Stop reason: HOSPADM

## 2021-01-01 RX ORDER — GABAPENTIN 300 MG/1
300 CAPSULE ORAL 3 TIMES DAILY
COMMUNITY

## 2021-01-01 RX ORDER — SODIUM CHLORIDE 9 MG/ML
1000 INJECTION, SOLUTION INTRAVENOUS CONTINUOUS
Status: DISCONTINUED | OUTPATIENT
Start: 2021-01-01 | End: 2021-01-01 | Stop reason: HOSPADM

## 2021-01-01 RX ORDER — FUROSEMIDE 20 MG/1
20 TABLET ORAL DAILY
COMMUNITY

## 2021-01-01 RX ORDER — SODIUM CHLORIDE 9 MG/ML
100 INJECTION, SOLUTION INTRAVENOUS CONTINUOUS
Status: DISCONTINUED | OUTPATIENT
Start: 2021-01-01 | End: 2021-01-01 | Stop reason: HOSPADM

## 2021-01-01 RX ORDER — MIRTAZAPINE 15 MG/1
15 TABLET, FILM COATED ORAL
Status: DISCONTINUED | OUTPATIENT
Start: 2021-01-01 | End: 2021-01-01

## 2021-01-01 RX ORDER — PANTOPRAZOLE SODIUM 40 MG/1
40 TABLET, DELAYED RELEASE ORAL DAILY
COMMUNITY

## 2021-01-01 RX ORDER — SODIUM CHLORIDE 0.9 % (FLUSH) 0.9 %
5-40 SYRINGE (ML) INJECTION AS NEEDED
Status: DISCONTINUED | OUTPATIENT
Start: 2021-01-01 | End: 2021-10-30 | Stop reason: HOSPADM

## 2021-01-01 RX ORDER — DEXAMETHASONE SODIUM PHOSPHATE 100 MG/10ML
10 INJECTION INTRAMUSCULAR; INTRAVENOUS ONCE
Status: COMPLETED | OUTPATIENT
Start: 2021-01-01 | End: 2021-01-01

## 2021-01-01 RX ORDER — POTASSIUM CHLORIDE 14.9 MG/ML
20 INJECTION INTRAVENOUS ONCE
Status: COMPLETED | OUTPATIENT
Start: 2021-01-01 | End: 2021-01-01

## 2021-01-01 RX ORDER — SODIUM CHLORIDE 9 MG/ML
75 INJECTION, SOLUTION INTRAVENOUS CONTINUOUS
Status: DISCONTINUED | OUTPATIENT
Start: 2021-01-01 | End: 2021-01-01 | Stop reason: HOSPADM

## 2021-01-01 RX ORDER — LEVOFLOXACIN 750 MG/1
750 TABLET ORAL DAILY
Qty: 6 TABLET | Refills: 0 | Status: SHIPPED | OUTPATIENT
Start: 2021-01-01 | End: 2021-01-01

## 2021-01-01 RX ORDER — BACLOFEN 10 MG/1
5 TABLET ORAL
Status: DISCONTINUED | OUTPATIENT
Start: 2021-01-01 | End: 2021-01-01 | Stop reason: HOSPADM

## 2021-01-01 RX ORDER — HYDROCODONE BITARTRATE AND ACETAMINOPHEN 10; 325 MG/1; MG/1
1 TABLET ORAL EVERY 12 HOURS
COMMUNITY
End: 2021-01-01 | Stop reason: DRUGHIGH

## 2021-01-01 RX ORDER — HYDROCODONE BITARTRATE AND ACETAMINOPHEN 10; 325 MG/1; MG/1
1 TABLET ORAL EVERY 4 HOURS
Status: DISCONTINUED | OUTPATIENT
Start: 2021-01-01 | End: 2021-01-01

## 2021-01-01 RX ORDER — SODIUM CHLORIDE 0.9 % (FLUSH) 0.9 %
5-40 SYRINGE (ML) INJECTION AS NEEDED
Status: DISCONTINUED | OUTPATIENT
Start: 2021-01-01 | End: 2021-01-01 | Stop reason: HOSPADM

## 2021-01-01 RX ORDER — POTASSIUM CHLORIDE 750 MG/1
10 TABLET, FILM COATED, EXTENDED RELEASE ORAL DAILY
COMMUNITY

## 2021-01-01 RX ORDER — PROCHLORPERAZINE MALEATE 10 MG
10 TABLET ORAL
COMMUNITY

## 2021-01-01 RX ORDER — FLUDEOXYGLUCOSE F-18 200 MCI/ML
10 INJECTION INTRAVENOUS ONCE
Status: COMPLETED | OUTPATIENT
Start: 2021-01-01 | End: 2021-01-01

## 2021-01-01 RX ORDER — MORPHINE SULFATE 30 MG/1
30 TABLET, FILM COATED, EXTENDED RELEASE ORAL EVERY 8 HOURS
Qty: 90 TABLET | Refills: 0 | Status: SHIPPED | OUTPATIENT
Start: 2021-01-01 | End: 2021-01-01 | Stop reason: SDUPTHER

## 2021-01-01 RX ORDER — HYDROCORTISONE SODIUM SUCCINATE 100 MG/2ML
100 INJECTION, POWDER, FOR SOLUTION INTRAMUSCULAR; INTRAVENOUS AS NEEDED
Status: ACTIVE | OUTPATIENT
Start: 2021-01-01 | End: 2021-01-01

## 2021-01-01 RX ORDER — BUDESONIDE AND FORMOTEROL FUMARATE DIHYDRATE 160; 4.5 UG/1; UG/1
2 AEROSOL RESPIRATORY (INHALATION)
Status: DISCONTINUED | OUTPATIENT
Start: 2021-01-01 | End: 2021-01-01 | Stop reason: HOSPADM

## 2021-01-01 RX ADMIN — MORPHINE SULFATE 15 MG: 15 TABLET, FILM COATED, EXTENDED RELEASE ORAL at 20:42

## 2021-01-01 RX ADMIN — TUBERCULIN PURIFIED PROTEIN DERIVATIVE 5 UNITS: 5 INJECTION, SOLUTION INTRADERMAL at 10:28

## 2021-01-01 RX ADMIN — ENOXAPARIN SODIUM 40 MG: 100 INJECTION SUBCUTANEOUS at 22:10

## 2021-01-01 RX ADMIN — PANTOPRAZOLE SODIUM 40 MG: 40 TABLET, DELAYED RELEASE ORAL at 09:01

## 2021-01-01 RX ADMIN — HYDROCODONE BITARTRATE AND ACETAMINOPHEN 1 TABLET: 10; 325 TABLET ORAL at 02:04

## 2021-01-01 RX ADMIN — METOPROLOL TARTRATE 12.5 MG: 25 TABLET, FILM COATED ORAL at 09:07

## 2021-01-01 RX ADMIN — Medication 400 MG: at 20:17

## 2021-01-01 RX ADMIN — HYDROCODONE BITARTRATE AND ACETAMINOPHEN 1 TABLET: 10; 325 TABLET ORAL at 17:19

## 2021-01-01 RX ADMIN — GABAPENTIN 300 MG: 300 CAPSULE ORAL at 17:10

## 2021-01-01 RX ADMIN — MIRTAZAPINE 15 MG: 15 TABLET, FILM COATED ORAL at 22:26

## 2021-01-01 RX ADMIN — POTASSIUM CHLORIDE 20 MEQ: 20 TABLET, EXTENDED RELEASE ORAL at 08:13

## 2021-01-01 RX ADMIN — ONDANSETRON 4 MG: 2 INJECTION INTRAMUSCULAR; INTRAVENOUS at 16:56

## 2021-01-01 RX ADMIN — PIPERACILLIN AND TAZOBACTAM 4.5 G: 4; .5 INJECTION, POWDER, FOR SOLUTION INTRAVENOUS at 20:00

## 2021-01-01 RX ADMIN — SODIUM CHLORIDE 25 ML/HR: 9 INJECTION, SOLUTION INTRAVENOUS at 08:00

## 2021-01-01 RX ADMIN — MORPHINE SULFATE 15 MG: 15 TABLET, FILM COATED, EXTENDED RELEASE ORAL at 09:07

## 2021-01-01 RX ADMIN — SODIUM CHLORIDE 75 ML/HR: 900 INJECTION, SOLUTION INTRAVENOUS at 17:19

## 2021-01-01 RX ADMIN — MORPHINE SULFATE 30 MG: 30 TABLET, FILM COATED, EXTENDED RELEASE ORAL at 21:26

## 2021-01-01 RX ADMIN — POLYETHYLENE GLYCOL 3350 17 G: 17 POWDER, FOR SOLUTION ORAL at 10:28

## 2021-01-01 RX ADMIN — METOPROLOL TARTRATE 12.5 MG: 25 TABLET, FILM COATED ORAL at 08:32

## 2021-01-01 RX ADMIN — HYDROCODONE BITARTRATE AND ACETAMINOPHEN 1 TABLET: 10; 325 TABLET ORAL at 05:07

## 2021-01-01 RX ADMIN — Medication 20 ML: at 09:00

## 2021-01-01 RX ADMIN — DEXAMETHASONE SODIUM PHOSPHATE 12 MG: 4 INJECTION, SOLUTION INTRAMUSCULAR; INTRAVENOUS at 19:31

## 2021-01-01 RX ADMIN — MORPHINE SULFATE 30 MG: 30 TABLET, FILM COATED, EXTENDED RELEASE ORAL at 14:15

## 2021-01-01 RX ADMIN — BUDESONIDE AND FORMOTEROL FUMARATE DIHYDRATE 2 PUFF: 80; 4.5 AEROSOL RESPIRATORY (INHALATION) at 07:53

## 2021-01-01 RX ADMIN — GABAPENTIN 300 MG: 300 CAPSULE ORAL at 22:19

## 2021-01-01 RX ADMIN — Medication 81 MG: at 08:12

## 2021-01-01 RX ADMIN — MORPHINE SULFATE 15 MG: 15 TABLET, FILM COATED, EXTENDED RELEASE ORAL at 10:16

## 2021-01-01 RX ADMIN — DEXAMETHASONE 4 MG: 4 TABLET ORAL at 17:26

## 2021-01-01 RX ADMIN — ENOXAPARIN SODIUM 40 MG: 100 INJECTION SUBCUTANEOUS at 21:23

## 2021-01-01 RX ADMIN — IOPAMIDOL 80 ML: 755 INJECTION, SOLUTION INTRAVENOUS at 14:13

## 2021-01-01 RX ADMIN — Medication 12 MCG/MIN: at 17:26

## 2021-01-01 RX ADMIN — GABAPENTIN 300 MG: 300 CAPSULE ORAL at 22:03

## 2021-01-01 RX ADMIN — FUROSEMIDE 20 MG: 20 TABLET ORAL at 08:33

## 2021-01-01 RX ADMIN — AMLODIPINE BESYLATE 5 MG: 5 TABLET ORAL at 08:33

## 2021-01-01 RX ADMIN — Medication 10 ML: at 21:29

## 2021-01-01 RX ADMIN — Medication 10 ML: at 14:13

## 2021-01-01 RX ADMIN — Medication 400 MG: at 21:50

## 2021-01-01 RX ADMIN — BUDESONIDE AND FORMOTEROL FUMARATE DIHYDRATE 2 PUFF: 160; 4.5 AEROSOL RESPIRATORY (INHALATION) at 21:19

## 2021-01-01 RX ADMIN — CARBOPLATIN 750 MG: 10 INJECTION, SOLUTION INTRAVENOUS at 21:27

## 2021-01-01 RX ADMIN — GABAPENTIN 300 MG: 300 CAPSULE ORAL at 09:05

## 2021-01-01 RX ADMIN — HYDROCORTISONE SODIUM SUCCINATE 100 MG: 100 INJECTION, POWDER, FOR SOLUTION INTRAMUSCULAR; INTRAVENOUS at 15:08

## 2021-01-01 RX ADMIN — SODIUM CHLORIDE AND POTASSIUM CHLORIDE: 9; 1.49 INJECTION, SOLUTION INTRAVENOUS at 22:00

## 2021-01-01 RX ADMIN — MORPHINE SULFATE 15 MG: 15 TABLET, FILM COATED, EXTENDED RELEASE ORAL at 11:26

## 2021-01-01 RX ADMIN — MAGNESIUM SULFATE HEPTAHYDRATE 2 G: 40 INJECTION, SOLUTION INTRAVENOUS at 08:32

## 2021-01-01 RX ADMIN — HYDROCODONE BITARTRATE AND ACETAMINOPHEN 1 TABLET: 10; 325 TABLET ORAL at 16:50

## 2021-01-01 RX ADMIN — HYDROCODONE BITARTRATE AND ACETAMINOPHEN 1 TABLET: 10; 325 TABLET ORAL at 20:01

## 2021-01-01 RX ADMIN — BUDESONIDE AND FORMOTEROL FUMARATE DIHYDRATE 2 PUFF: 80; 4.5 AEROSOL RESPIRATORY (INHALATION) at 08:44

## 2021-01-01 RX ADMIN — AMLODIPINE BESYLATE 5 MG: 5 TABLET ORAL at 09:00

## 2021-01-01 RX ADMIN — ENOXAPARIN SODIUM 40 MG: 40 INJECTION SUBCUTANEOUS at 20:17

## 2021-01-01 RX ADMIN — Medication 400 MG: at 10:16

## 2021-01-01 RX ADMIN — GABAPENTIN 300 MG: 300 CAPSULE ORAL at 17:06

## 2021-01-01 RX ADMIN — ENOXAPARIN SODIUM 40 MG: 40 INJECTION SUBCUTANEOUS at 19:37

## 2021-01-01 RX ADMIN — PANTOPRAZOLE SODIUM 40 MG: 40 TABLET, DELAYED RELEASE ORAL at 08:56

## 2021-01-01 RX ADMIN — Medication 400 MG: at 22:19

## 2021-01-01 RX ADMIN — MIRTAZAPINE 15 MG: 15 TABLET, FILM COATED ORAL at 22:19

## 2021-01-01 RX ADMIN — HYDROMORPHONE HYDROCHLORIDE 1 MG: 1 INJECTION, SOLUTION INTRAMUSCULAR; INTRAVENOUS; SUBCUTANEOUS at 10:03

## 2021-01-01 RX ADMIN — EPINEPHRINE 5 MCG/MIN: 1 INJECTION, SOLUTION, CONCENTRATE INTRAVENOUS at 18:12

## 2021-01-01 RX ADMIN — GABAPENTIN 300 MG: 300 CAPSULE ORAL at 08:32

## 2021-01-01 RX ADMIN — HYDROCODONE BITARTRATE AND ACETAMINOPHEN 1 TABLET: 10; 325 TABLET ORAL at 04:59

## 2021-01-01 RX ADMIN — GABAPENTIN 300 MG: 300 CAPSULE ORAL at 08:26

## 2021-01-01 RX ADMIN — Medication 400 MG: at 08:13

## 2021-01-01 RX ADMIN — HYDROMORPHONE HYDROCHLORIDE 1 MG: 1 INJECTION, SOLUTION INTRAMUSCULAR; INTRAVENOUS; SUBCUTANEOUS at 15:32

## 2021-01-01 RX ADMIN — DEXAMETHASONE 4 MG: 4 TABLET ORAL at 05:37

## 2021-01-01 RX ADMIN — PANTOPRAZOLE SODIUM 40 MG: 40 TABLET, DELAYED RELEASE ORAL at 10:16

## 2021-01-01 RX ADMIN — MORPHINE SULFATE 15 MG: 15 TABLET, FILM COATED, EXTENDED RELEASE ORAL at 08:33

## 2021-01-01 RX ADMIN — GADOTERIDOL 15 ML: 279.3 INJECTION, SOLUTION INTRAVENOUS at 09:56

## 2021-01-01 RX ADMIN — POTASSIUM CHLORIDE 20 MEQ: 20 TABLET, EXTENDED RELEASE ORAL at 09:05

## 2021-01-01 RX ADMIN — FUROSEMIDE 20 MG: 20 TABLET ORAL at 10:16

## 2021-01-01 RX ADMIN — Medication 20 ML: at 09:01

## 2021-01-01 RX ADMIN — VASOPRESSIN 0.08 UNITS/MIN: 20 INJECTION INTRAVENOUS at 19:08

## 2021-01-01 RX ADMIN — POLYETHYLENE GLYCOL 3350 17 G: 17 POWDER, FOR SOLUTION ORAL at 20:20

## 2021-01-01 RX ADMIN — GABAPENTIN 300 MG: 300 CAPSULE ORAL at 21:24

## 2021-01-01 RX ADMIN — HYDROCODONE BITARTRATE AND ACETAMINOPHEN 1 TABLET: 10; 325 TABLET ORAL at 05:25

## 2021-01-01 RX ADMIN — Medication 10 ML: at 10:24

## 2021-01-01 RX ADMIN — MORPHINE SULFATE 15 MG: 15 TABLET, FILM COATED, EXTENDED RELEASE ORAL at 20:01

## 2021-01-01 RX ADMIN — FUROSEMIDE 20 MG: 20 TABLET ORAL at 08:13

## 2021-01-01 RX ADMIN — METOPROLOL TARTRATE 12.5 MG: 25 TABLET, FILM COATED ORAL at 17:10

## 2021-01-01 RX ADMIN — GABAPENTIN 300 MG: 300 CAPSULE ORAL at 17:26

## 2021-01-01 RX ADMIN — ONDANSETRON 8 MG: 2 INJECTION INTRAMUSCULAR; INTRAVENOUS at 19:31

## 2021-01-01 RX ADMIN — MORPHINE SULFATE 15 MG: 15 TABLET, FILM COATED, EXTENDED RELEASE ORAL at 09:01

## 2021-01-01 RX ADMIN — BUDESONIDE AND FORMOTEROL FUMARATE DIHYDRATE 2 PUFF: 80; 4.5 AEROSOL RESPIRATORY (INHALATION) at 20:48

## 2021-01-01 RX ADMIN — FOSAPREPITANT 150 MG: 150 INJECTION, POWDER, LYOPHILIZED, FOR SOLUTION INTRAVENOUS at 19:31

## 2021-01-01 RX ADMIN — HYDROCODONE BITARTRATE AND ACETAMINOPHEN 1 TABLET: 10; 325 TABLET ORAL at 02:39

## 2021-01-01 RX ADMIN — POTASSIUM CHLORIDE 20 MEQ: 20 TABLET, EXTENDED RELEASE ORAL at 18:01

## 2021-01-01 RX ADMIN — GABAPENTIN 300 MG: 300 CAPSULE ORAL at 22:10

## 2021-01-01 RX ADMIN — Medication 10 ML: at 05:35

## 2021-01-01 RX ADMIN — VASOPRESSIN 0.1 UNITS/MIN: 20 INJECTION INTRAVENOUS at 22:37

## 2021-01-01 RX ADMIN — HYDROCODONE BITARTRATE AND ACETAMINOPHEN 1 TABLET: 10; 325 TABLET ORAL at 08:12

## 2021-01-01 RX ADMIN — PANTOPRAZOLE SODIUM 40 MG: 40 TABLET, DELAYED RELEASE ORAL at 08:32

## 2021-01-01 RX ADMIN — Medication 10 ML: at 05:24

## 2021-01-01 RX ADMIN — MIRTAZAPINE 15 MG: 15 TABLET, FILM COATED ORAL at 21:26

## 2021-01-01 RX ADMIN — Medication 4 MCG/MIN: at 17:24

## 2021-01-01 RX ADMIN — LEVOFLOXACIN 750 MG: 500 TABLET, FILM COATED ORAL at 13:20

## 2021-01-01 RX ADMIN — BUDESONIDE AND FORMOTEROL FUMARATE DIHYDRATE 2 PUFF: 80; 4.5 AEROSOL RESPIRATORY (INHALATION) at 08:22

## 2021-01-01 RX ADMIN — DEXAMETHASONE 4 MG: 4 TABLET ORAL at 05:28

## 2021-01-01 RX ADMIN — Medication 81 MG: at 09:05

## 2021-01-01 RX ADMIN — POTASSIUM CHLORIDE 10 MEQ: 750 TABLET, EXTENDED RELEASE ORAL at 09:01

## 2021-01-01 RX ADMIN — SODIUM CHLORIDE AND POTASSIUM CHLORIDE: 9; 1.49 INJECTION, SOLUTION INTRAVENOUS at 17:15

## 2021-01-01 RX ADMIN — GABAPENTIN 300 MG: 300 CAPSULE ORAL at 21:30

## 2021-01-01 RX ADMIN — HYDROCODONE BITARTRATE AND ACETAMINOPHEN 1 TABLET: 10; 325 TABLET ORAL at 06:02

## 2021-01-01 RX ADMIN — MORPHINE SULFATE 15 MG: 15 TABLET, FILM COATED, EXTENDED RELEASE ORAL at 20:17

## 2021-01-01 RX ADMIN — MIRTAZAPINE 15 MG: 15 TABLET, FILM COATED ORAL at 21:30

## 2021-01-01 RX ADMIN — DEXAMETHASONE 4 MG: 4 TABLET ORAL at 00:04

## 2021-01-01 RX ADMIN — ENOXAPARIN SODIUM 40 MG: 100 INJECTION SUBCUTANEOUS at 21:29

## 2021-01-01 RX ADMIN — GABAPENTIN 300 MG: 300 CAPSULE ORAL at 09:07

## 2021-01-01 RX ADMIN — ETOPOSIDE 191 MG: 20 INJECTION INTRAVENOUS at 20:21

## 2021-01-01 RX ADMIN — POTASSIUM CHLORIDE 20 MEQ: 200 INJECTION, SOLUTION INTRAVENOUS at 12:50

## 2021-01-01 RX ADMIN — HYDROCODONE BITARTRATE AND ACETAMINOPHEN 1 TABLET: 10; 325 TABLET ORAL at 21:26

## 2021-01-01 RX ADMIN — MORPHINE SULFATE 30 MG: 30 TABLET, FILM COATED, EXTENDED RELEASE ORAL at 21:44

## 2021-01-01 RX ADMIN — METOPROLOL TARTRATE 12.5 MG: 25 TABLET, FILM COATED ORAL at 21:24

## 2021-01-01 RX ADMIN — Medication 400 MG: at 09:05

## 2021-01-01 RX ADMIN — GABAPENTIN 300 MG: 300 CAPSULE ORAL at 15:47

## 2021-01-01 RX ADMIN — GABAPENTIN 300 MG: 300 CAPSULE ORAL at 21:26

## 2021-01-01 RX ADMIN — HYDROCODONE BITARTRATE AND ACETAMINOPHEN 1 TABLET: 10; 325 TABLET ORAL at 20:26

## 2021-01-01 RX ADMIN — BUDESONIDE AND FORMOTEROL FUMARATE DIHYDRATE 2 PUFF: 80; 4.5 AEROSOL RESPIRATORY (INHALATION) at 20:01

## 2021-01-01 RX ADMIN — HYDROCODONE BITARTRATE AND ACETAMINOPHEN 1 TABLET: 10; 325 TABLET ORAL at 17:04

## 2021-01-01 RX ADMIN — ENOXAPARIN SODIUM 40 MG: 40 INJECTION SUBCUTANEOUS at 21:25

## 2021-01-01 RX ADMIN — HYDROCODONE BITARTRATE AND ACETAMINOPHEN 1 TABLET: 10; 325 TABLET ORAL at 09:00

## 2021-01-01 RX ADMIN — HYDROCODONE BITARTRATE AND ACETAMINOPHEN 1 TABLET: 10; 325 TABLET ORAL at 09:06

## 2021-01-01 RX ADMIN — POTASSIUM CHLORIDE 20 MEQ: 20 TABLET, EXTENDED RELEASE ORAL at 17:19

## 2021-01-01 RX ADMIN — GABAPENTIN 300 MG: 300 CAPSULE ORAL at 16:50

## 2021-01-01 RX ADMIN — Medication 400 MG: at 17:06

## 2021-01-01 RX ADMIN — SODIUM CHLORIDE 1000 ML: 900 INJECTION, SOLUTION INTRAVENOUS at 12:50

## 2021-01-01 RX ADMIN — Medication 81 MG: at 08:33

## 2021-01-01 RX ADMIN — MORPHINE SULFATE 15 MG: 15 TABLET, FILM COATED, EXTENDED RELEASE ORAL at 20:26

## 2021-01-01 RX ADMIN — SODIUM CHLORIDE 75 ML/HR: 900 INJECTION, SOLUTION INTRAVENOUS at 05:26

## 2021-01-01 RX ADMIN — BACLOFEN 5 MG: 10 TABLET ORAL at 23:45

## 2021-01-01 RX ADMIN — VASOPRESSIN 0.03 UNITS/MIN: 20 INJECTION INTRAVENOUS at 19:02

## 2021-01-01 RX ADMIN — AMLODIPINE BESYLATE 5 MG: 5 TABLET ORAL at 09:05

## 2021-01-01 RX ADMIN — GABAPENTIN 300 MG: 300 CAPSULE ORAL at 21:44

## 2021-01-01 RX ADMIN — SODIUM CHLORIDE 1000 ML: 900 INJECTION, SOLUTION INTRAVENOUS at 13:27

## 2021-01-01 RX ADMIN — POTASSIUM CHLORIDE 20 MEQ: 20 TABLET, EXTENDED RELEASE ORAL at 17:04

## 2021-01-01 RX ADMIN — BUDESONIDE AND FORMOTEROL FUMARATE DIHYDRATE 2 PUFF: 160; 4.5 AEROSOL RESPIRATORY (INHALATION) at 08:10

## 2021-01-01 RX ADMIN — Medication 400 MG: at 09:07

## 2021-01-01 RX ADMIN — PANTOPRAZOLE SODIUM 40 MG: 40 TABLET, DELAYED RELEASE ORAL at 08:27

## 2021-01-01 RX ADMIN — BUDESONIDE AND FORMOTEROL FUMARATE DIHYDRATE 2 PUFF: 80; 4.5 AEROSOL RESPIRATORY (INHALATION) at 08:09

## 2021-01-01 RX ADMIN — MIRTAZAPINE 15 MG: 15 TABLET, FILM COATED ORAL at 22:10

## 2021-01-01 RX ADMIN — Medication 81 MG: at 09:01

## 2021-01-01 RX ADMIN — FLUDEOXYGLUCOSE F-18 11.8 MILLICURIE: 200 INJECTION INTRAVENOUS at 10:24

## 2021-01-01 RX ADMIN — ENOXAPARIN SODIUM 40 MG: 40 INJECTION SUBCUTANEOUS at 20:43

## 2021-01-01 RX ADMIN — MORPHINE SULFATE 15 MG: 15 TABLET, FILM COATED, EXTENDED RELEASE ORAL at 21:26

## 2021-01-01 RX ADMIN — Medication 10 ML: at 13:13

## 2021-01-01 RX ADMIN — Medication 400 MG: at 08:32

## 2021-01-01 RX ADMIN — HYDROCODONE BITARTRATE AND ACETAMINOPHEN 1 TABLET: 10; 325 TABLET ORAL at 18:01

## 2021-01-01 RX ADMIN — HYDROCODONE BITARTRATE AND ACETAMINOPHEN 1 TABLET: 10; 325 TABLET ORAL at 17:15

## 2021-01-01 RX ADMIN — GABAPENTIN 300 MG: 300 CAPSULE ORAL at 08:33

## 2021-01-01 RX ADMIN — SODIUM CHLORIDE 1000 ML: 900 INJECTION, SOLUTION INTRAVENOUS at 11:20

## 2021-01-01 RX ADMIN — SODIUM CHLORIDE 100 ML: 900 INJECTION, SOLUTION INTRAVENOUS at 13:13

## 2021-01-01 RX ADMIN — MORPHINE SULFATE 30 MG: 30 TABLET, FILM COATED, EXTENDED RELEASE ORAL at 22:10

## 2021-01-01 RX ADMIN — GABAPENTIN 300 MG: 300 CAPSULE ORAL at 09:00

## 2021-01-01 RX ADMIN — HYDROCODONE BITARTRATE AND ACETAMINOPHEN 1 TABLET: 10; 325 TABLET ORAL at 20:42

## 2021-01-01 RX ADMIN — DEXAMETHASONE 4 MG: 4 TABLET ORAL at 05:09

## 2021-01-01 RX ADMIN — GABAPENTIN 300 MG: 300 CAPSULE ORAL at 09:01

## 2021-01-01 RX ADMIN — BUDESONIDE AND FORMOTEROL FUMARATE DIHYDRATE 2 PUFF: 80; 4.5 AEROSOL RESPIRATORY (INHALATION) at 19:53

## 2021-01-01 RX ADMIN — MIRTAZAPINE 15 MG: 15 TABLET, FILM COATED ORAL at 21:44

## 2021-01-01 RX ADMIN — Medication 400 MG: at 21:24

## 2021-01-01 RX ADMIN — Medication 10 ML: at 09:56

## 2021-01-01 RX ADMIN — GABAPENTIN 300 MG: 300 CAPSULE ORAL at 17:19

## 2021-01-01 RX ADMIN — FLUTICASONE PROPIONATE 2 SPRAY: 50 SPRAY, METERED NASAL at 08:33

## 2021-01-01 RX ADMIN — PIPERACILLIN SODIUM AND TAZOBACTAM SODIUM 4.5 G: 4; .5 INJECTION, POWDER, LYOPHILIZED, FOR SOLUTION INTRAVENOUS at 11:20

## 2021-01-01 RX ADMIN — Medication 400 MG: at 09:01

## 2021-01-01 RX ADMIN — MORPHINE SULFATE 15 MG: 15 TABLET, FILM COATED, EXTENDED RELEASE ORAL at 21:24

## 2021-01-01 RX ADMIN — ONDANSETRON 4 MG: 2 INJECTION INTRAMUSCULAR; INTRAVENOUS at 00:56

## 2021-01-01 RX ADMIN — ASPIRIN 81 MG: 81 TABLET, COATED ORAL at 10:16

## 2021-01-01 RX ADMIN — ONDANSETRON 4 MG: 4 TABLET, ORALLY DISINTEGRATING ORAL at 04:38

## 2021-01-01 RX ADMIN — METOPROLOL TARTRATE 12.5 MG: 25 TABLET, FILM COATED ORAL at 17:26

## 2021-01-01 RX ADMIN — LIDOCAINE HYDROCHLORIDE 80 MG: 20 INJECTION, SOLUTION INFILTRATION; PERINEURAL at 09:51

## 2021-01-01 RX ADMIN — SODIUM CHLORIDE 150 ML/HR: 900 INJECTION, SOLUTION INTRAVENOUS at 19:25

## 2021-01-01 RX ADMIN — POTASSIUM CHLORIDE 20 MEQ: 20 TABLET, EXTENDED RELEASE ORAL at 09:01

## 2021-01-01 RX ADMIN — PANTOPRAZOLE SODIUM 40 MG: 40 TABLET, DELAYED RELEASE ORAL at 09:05

## 2021-01-01 RX ADMIN — Medication 400 MG: at 15:47

## 2021-01-01 RX ADMIN — PANTOPRAZOLE SODIUM 40 MG: 40 TABLET, DELAYED RELEASE ORAL at 09:07

## 2021-01-01 RX ADMIN — Medication 400 MG: at 17:10

## 2021-01-01 RX ADMIN — SODIUM CHLORIDE 1000 ML: 900 INJECTION, SOLUTION INTRAVENOUS at 16:56

## 2021-01-01 RX ADMIN — ENOXAPARIN SODIUM 40 MG: 40 INJECTION SUBCUTANEOUS at 20:00

## 2021-01-01 RX ADMIN — METOPROLOL TARTRATE 12.5 MG: 25 TABLET, FILM COATED ORAL at 08:54

## 2021-01-01 RX ADMIN — DEXAMETHASONE SODIUM PHOSPHATE 8 MG: 4 INJECTION, SOLUTION INTRAMUSCULAR; INTRAVENOUS at 14:58

## 2021-01-01 RX ADMIN — SODIUM CHLORIDE 1000 ML: 9 INJECTION, SOLUTION INTRAVENOUS at 10:12

## 2021-01-01 RX ADMIN — ONDANSETRON 8 MG: 2 INJECTION INTRAMUSCULAR; INTRAVENOUS at 12:41

## 2021-01-01 RX ADMIN — HYDROCODONE BITARTRATE AND ACETAMINOPHEN 1 TABLET: 10; 325 TABLET ORAL at 09:07

## 2021-01-01 RX ADMIN — PANTOPRAZOLE SODIUM 40 MG: 40 TABLET, DELAYED RELEASE ORAL at 08:12

## 2021-01-01 RX ADMIN — GABAPENTIN 300 MG: 300 CAPSULE ORAL at 21:50

## 2021-01-01 RX ADMIN — ENOXAPARIN SODIUM 40 MG: 40 INJECTION SUBCUTANEOUS at 20:27

## 2021-01-01 RX ADMIN — DEXAMETHASONE SODIUM PHOSPHATE 10 MG: 10 INJECTION INTRAMUSCULAR; INTRAVENOUS at 12:48

## 2021-01-01 RX ADMIN — EPINEPHRINE 1 MCG/MIN: 1 INJECTION, SOLUTION, CONCENTRATE INTRAVENOUS at 18:06

## 2021-01-01 RX ADMIN — SODIUM CHLORIDE 75 ML/HR: 900 INJECTION, SOLUTION INTRAVENOUS at 08:26

## 2021-01-01 RX ADMIN — MORPHINE SULFATE 30 MG: 30 TABLET, FILM COATED, EXTENDED RELEASE ORAL at 08:26

## 2021-01-01 RX ADMIN — FUROSEMIDE 20 MG: 20 TABLET ORAL at 09:05

## 2021-01-01 RX ADMIN — MORPHINE SULFATE 15 MG: 15 TABLET, FILM COATED, EXTENDED RELEASE ORAL at 09:05

## 2021-01-01 RX ADMIN — DEXAMETHASONE 4 MG: 4 TABLET ORAL at 23:40

## 2021-01-01 RX ADMIN — SODIUM CHLORIDE 75 ML/HR: 900 INJECTION, SOLUTION INTRAVENOUS at 19:55

## 2021-01-01 RX ADMIN — Medication 10 ML: at 14:00

## 2021-01-01 RX ADMIN — HYDROCODONE BITARTRATE AND ACETAMINOPHEN 1 TABLET: 10; 325 TABLET ORAL at 10:34

## 2021-01-01 RX ADMIN — HYDROCODONE BITARTRATE AND ACETAMINOPHEN 1 TABLET: 10; 325 TABLET ORAL at 09:01

## 2021-01-01 RX ADMIN — GABAPENTIN 300 MG: 300 CAPSULE ORAL at 08:54

## 2021-01-01 RX ADMIN — EPINEPHRINE 7 MCG/MIN: 1 INJECTION, SOLUTION, CONCENTRATE INTRAVENOUS at 18:19

## 2021-01-01 RX ADMIN — Medication 400 MG: at 08:27

## 2021-01-01 RX ADMIN — MIRTAZAPINE 15 MG: 15 TABLET, FILM COATED ORAL at 22:02

## 2021-01-01 RX ADMIN — ETOPOSIDE 191 MG: 20 INJECTION INTRAVENOUS at 15:41

## 2021-01-01 RX ADMIN — Medication 400 MG: at 21:44

## 2021-01-01 RX ADMIN — HYDROCODONE BITARTRATE AND ACETAMINOPHEN 1 TABLET: 10; 325 TABLET ORAL at 21:24

## 2021-01-01 RX ADMIN — MORPHINE SULFATE 4 MG: 4 INJECTION INTRAVENOUS at 10:43

## 2021-01-01 RX ADMIN — IOPAMIDOL 100 ML: 755 INJECTION, SOLUTION INTRAVENOUS at 13:13

## 2021-01-01 RX ADMIN — MORPHINE SULFATE 30 MG: 30 TABLET, FILM COATED, EXTENDED RELEASE ORAL at 08:55

## 2021-01-01 RX ADMIN — GABAPENTIN 300 MG: 300 CAPSULE ORAL at 08:12

## 2021-01-01 RX ADMIN — DEXAMETHASONE 4 MG: 4 TABLET ORAL at 11:26

## 2021-01-01 RX ADMIN — DEXAMETHASONE 4 MG: 4 TABLET ORAL at 12:09

## 2021-01-01 RX ADMIN — HYDROCODONE BITARTRATE AND ACETAMINOPHEN 1 TABLET: 10; 325 TABLET ORAL at 12:55

## 2021-01-01 RX ADMIN — MORPHINE SULFATE 15 MG: 15 TABLET, FILM COATED, EXTENDED RELEASE ORAL at 22:02

## 2021-01-01 RX ADMIN — ONDANSETRON 4 MG: 2 INJECTION INTRAMUSCULAR; INTRAVENOUS at 15:32

## 2021-01-01 RX ADMIN — ONDANSETRON 8 MG: 2 INJECTION INTRAMUSCULAR; INTRAVENOUS at 10:10

## 2021-01-01 RX ADMIN — GABAPENTIN 300 MG: 300 CAPSULE ORAL at 15:53

## 2021-01-01 RX ADMIN — METOPROLOL TARTRATE 12.5 MG: 25 TABLET, FILM COATED ORAL at 08:27

## 2021-01-01 RX ADMIN — FUROSEMIDE 20 MG: 20 TABLET ORAL at 09:00

## 2021-01-01 RX ADMIN — Medication 10 ML: at 22:26

## 2021-01-01 RX ADMIN — MORPHINE SULFATE 30 MG: 30 TABLET, FILM COATED, EXTENDED RELEASE ORAL at 14:13

## 2021-01-01 RX ADMIN — DEXAMETHASONE 4 MG: 4 TABLET ORAL at 10:44

## 2021-01-01 RX ADMIN — Medication 2 MCG/MIN: at 17:18

## 2021-01-01 RX ADMIN — METOPROLOL TARTRATE 12.5 MG: 25 TABLET, FILM COATED ORAL at 17:06

## 2021-01-01 RX ADMIN — SODIUM CHLORIDE 1000 ML: 900 INJECTION, SOLUTION INTRAVENOUS at 15:17

## 2021-01-01 RX ADMIN — ETOPOSIDE 191 MG: 20 INJECTION INTRAVENOUS at 13:44

## 2021-01-01 RX ADMIN — Medication 400 MG: at 17:26

## 2021-01-01 RX ADMIN — HYDROCODONE BITARTRATE AND ACETAMINOPHEN 1 TABLET: 10; 325 TABLET ORAL at 00:56

## 2021-01-01 RX ADMIN — GABAPENTIN 300 MG: 300 CAPSULE ORAL at 10:16

## 2021-01-01 RX ADMIN — MIRTAZAPINE 15 MG: 15 TABLET, FILM COATED ORAL at 21:50

## 2021-01-01 RX ADMIN — Medication 400 MG: at 15:41

## 2021-01-01 RX ADMIN — SODIUM CHLORIDE 75 ML/HR: 900 INJECTION, SOLUTION INTRAVENOUS at 15:53

## 2021-01-01 RX ADMIN — ONDANSETRON 4 MG: 2 INJECTION INTRAMUSCULAR; INTRAVENOUS at 10:04

## 2021-01-01 RX ADMIN — MIRTAZAPINE 15 MG: 15 TABLET, FILM COATED ORAL at 21:24

## 2021-01-01 RX ADMIN — Medication 10 ML: at 22:03

## 2021-01-01 RX ADMIN — HYDROCODONE BITARTRATE AND ACETAMINOPHEN 1 TABLET: 10; 325 TABLET ORAL at 05:09

## 2021-01-01 RX ADMIN — ASPIRIN 81 MG: 81 TABLET, COATED ORAL at 09:01

## 2021-01-01 RX ADMIN — Medication 10 ML: at 09:55

## 2021-01-01 RX ADMIN — POTASSIUM CHLORIDE 10 MEQ: 750 TABLET, EXTENDED RELEASE ORAL at 10:16

## 2021-01-01 RX ADMIN — DEXAMETHASONE 4 MG: 4 TABLET ORAL at 17:06

## 2021-01-01 RX ADMIN — DEXAMETHASONE 4 MG: 4 TABLET ORAL at 17:10

## 2021-01-01 RX ADMIN — DEXAMETHASONE 4 MG: 4 TABLET ORAL at 12:24

## 2021-01-01 RX ADMIN — DEXAMETHASONE 4 MG: 4 TABLET ORAL at 23:18

## 2021-01-01 RX ADMIN — HYDROCODONE BITARTRATE AND ACETAMINOPHEN 1 TABLET: 10; 325 TABLET ORAL at 09:05

## 2021-01-01 RX ADMIN — SODIUM CHLORIDE 100 ML: 900 INJECTION, SOLUTION INTRAVENOUS at 14:13

## 2021-01-01 RX ADMIN — Medication 400 MG: at 08:54

## 2021-01-01 RX ADMIN — ENOXAPARIN SODIUM 40 MG: 100 INJECTION SUBCUTANEOUS at 21:43

## 2021-01-01 RX ADMIN — FLUTICASONE PROPIONATE 2 SPRAY: 50 SPRAY, METERED NASAL at 08:26

## 2021-01-01 RX ADMIN — HYDROCODONE BITARTRATE AND ACETAMINOPHEN 1 TABLET: 10; 325 TABLET ORAL at 13:23

## 2021-01-01 RX ADMIN — MORPHINE SULFATE 30 MG: 30 TABLET, FILM COATED, EXTENDED RELEASE ORAL at 05:28

## 2021-01-01 RX ADMIN — SODIUM CHLORIDE AND POTASSIUM CHLORIDE: 9; 1.49 INJECTION, SOLUTION INTRAVENOUS at 11:26

## 2021-01-01 RX ADMIN — HYDROCODONE BITARTRATE AND ACETAMINOPHEN 1 TABLET: 10; 325 TABLET ORAL at 02:41

## 2021-01-01 RX ADMIN — MIDAZOLAM 5 MG: 1 INJECTION INTRAMUSCULAR; INTRAVENOUS at 17:36

## 2021-01-01 RX ADMIN — Medication 30 MCG/MIN: at 17:34

## 2021-01-01 RX ADMIN — POTASSIUM CHLORIDE 20 MEQ: 20 TABLET, EXTENDED RELEASE ORAL at 08:33

## 2021-01-01 RX ADMIN — Medication 400 MG: at 16:51

## 2021-01-01 RX ADMIN — AMLODIPINE BESYLATE 5 MG: 5 TABLET ORAL at 09:01

## 2021-01-01 RX ADMIN — PANTOPRAZOLE SODIUM 40 MG: 40 TABLET, DELAYED RELEASE ORAL at 08:33

## 2021-01-01 RX ADMIN — DEXAMETHASONE 4 MG: 4 TABLET ORAL at 00:05

## 2021-01-01 RX ADMIN — MORPHINE SULFATE 4 MG: 4 INJECTION INTRAVENOUS at 20:02

## 2021-01-01 RX ADMIN — FUROSEMIDE 20 MG: 20 TABLET ORAL at 09:01

## 2021-01-01 RX ADMIN — DEXAMETHASONE SODIUM PHOSPHATE 8 MG: 4 INJECTION, SOLUTION INTRAMUSCULAR; INTRAVENOUS at 13:28

## 2021-01-01 RX ADMIN — Medication 10 ML: at 11:15

## 2021-01-01 RX ADMIN — POLYETHYLENE GLYCOL 3350 17 G: 17 POWDER, FOR SOLUTION ORAL at 08:26

## 2021-01-01 RX ADMIN — HYDROCODONE BITARTRATE AND ACETAMINOPHEN 1 TABLET: 10; 325 TABLET ORAL at 12:52

## 2021-01-01 RX ADMIN — MORPHINE SULFATE 15 MG: 15 TABLET, FILM COATED, EXTENDED RELEASE ORAL at 08:13

## 2021-01-01 RX ADMIN — HYDROCODONE BITARTRATE AND ACETAMINOPHEN 1 TABLET: 5; 325 TABLET ORAL at 20:17

## 2021-01-01 RX ADMIN — POTASSIUM CHLORIDE 20 MEQ: 20 TABLET, EXTENDED RELEASE ORAL at 09:07

## 2021-01-01 RX ADMIN — POTASSIUM CHLORIDE, DEXTROSE MONOHYDRATE AND SODIUM CHLORIDE 75 ML/HR: 150; 5; 900 INJECTION, SOLUTION INTRAVENOUS at 21:57

## 2021-01-01 RX ADMIN — DIATRIZOATE MEGLUMINE AND DIATRIZOATE SODIUM 10 ML: 660; 100 LIQUID ORAL; RECTAL at 10:24

## 2021-01-01 RX ADMIN — BUDESONIDE AND FORMOTEROL FUMARATE DIHYDRATE 2 PUFF: 160; 4.5 AEROSOL RESPIRATORY (INHALATION) at 08:35

## 2021-01-01 RX ADMIN — EPINEPHRINE 10 MCG/MIN: 1 INJECTION, SOLUTION, CONCENTRATE INTRAVENOUS at 18:23

## 2021-08-16 PROBLEM — J90 PLEURAL EFFUSION ON RIGHT: Status: ACTIVE | Noted: 2021-01-01

## 2021-08-16 PROBLEM — R06.02 SHORTNESS OF BREATH: Status: ACTIVE | Noted: 2021-01-01

## 2021-08-16 PROBLEM — E27.8 ADRENAL NODULE (HCC): Status: ACTIVE | Noted: 2021-01-01

## 2021-08-16 PROBLEM — J98.59 MEDIASTINAL MASS: Status: ACTIVE | Noted: 2021-01-01

## 2021-08-16 PROBLEM — R59.0 AXILLARY ADENOPATHY: Status: ACTIVE | Noted: 2021-01-01

## 2021-08-16 NOTE — INTERVAL H&P NOTE
Update History & Physical    The Patient's History and Physical of August 16, 2021  The procedure was reviewed with the patient and I examined the patient. There was no change. The surgical site was confirmed by the patient and me. Plan:  The risk, benefits, expected outcome, and alternative to the recommended procedure have been discussed with the patient. Patient understands and wants to proceed with the procedure.     Electronically signed by Nuvia Blakely MD on 8/16/2021 at 11:44 AM

## 2021-08-16 NOTE — PROCEDURES
Pre PROCEDURE DX :    DIAGNOSTIC/THERAPEUTIC THORACENTESIS. INDICATION/ Site:    PLEURAL EFFUSION --- right    POST PROCEDURE DX : bloody effusion    Patient identification: done by Watson ( RT )    ANESTHESIA:    LOCAL ANESTHESIA WITH 1% LIDOCAINE 10CC TOTAL. CHEST ULTRASOUND FINDINGS:    A Turbo-M, Sonosite ultrasound with a 5-16 mHz probe was used to image the chest and localize the pleural effusion on the right  chest.    A large anechoic space was seen on the right consistent with an uncomplicated pleural effusion. U/S was done on the left and there was no significant amount of fluid noted to tap    See image for review. DESCRIPTION OF PROCEDURE:    After obtaining informed consent and localizing the safest location for thoracentesis, the  space was marked with a blunt, plastic needle cap in the mid scapular line. An Layer3 TV AK-0100 Pleral-Seal thoracentesis kit was used to perform the procedure. The skin was  cleansed with the supplied  chlorhexididne swab and then draped in the usual fasion. Using the previously marked location as a giude, a 22 G 1.5 inch needle was used to inject 10 cc of 1% lidocaine into the skin and subcutaneous tissue, as well as onto the underlying rib and inter-costal muscles, pleural fluid was aspirated to assure proper location, prior to removing the anesthesia needle. A 3mm  incision was then made, with the supplied scalpel in the usual fashion to facilitate the insertiopn of the thoracentesis needle. The needle with an 8French thoracentesis catheter was then introduced into the chest through the previously made incision in the usual fashio,. The rib licalized with the needle, and the catheter then marched over the rib into the pleural space. After aspirating fluid, the thoracentesis catheter was then placed into the chest using the needle itself as a trocar.     The needle was then removed and the catheter was attached to the supplied tubing without complication. A total of 1600 cc of bloody  fluid was removed without complication. Lung slide was performed and revealed only small amount of fluid remained. CT was ordered for F/U  . The patient was stable post procedure.      EBL : 2 ml    STUDIES ORDERED:   Protein, glucose, LDH   Cell count and differential   Routine C&S   AFB smear and cultures   Fungal smear and cultures   Cytology    Will repeat chest CT today as well to determine best next step if fluid cytology is neg    Robin Barney MD

## 2021-08-16 NOTE — DISCHARGE INSTRUCTIONS
Patient Education        Thoracentesis: What to Expect at Home  Your Recovery  Thoracentesis (say \"ojad-ig-bzz-LUIZ-sis\") is a procedure to remove fluid from the space between the lungs and the chest wall (pleural space). This procedure may also be called a \"chest tap. \" It's normal to have a small amount of fluid in the pleural space. But too much fluid can build up because of problems such as infection, heart failure, or lung cancer. The procedure may have been done to help with shortness of breath and pain caused by the fluid buildup. Or you may have had this procedure so the doctor could test the fluid to find the cause of the buildup. Your chest may be sore where the doctor put the needle or catheter into your skin (the puncture site). This usually gets better after a day or two. You can go back to work or your normal activities as soon as you feel up to it. If a large amount of pleural fluid was removed during the procedure, you will probably be able to breathe more easily. If more pleural fluid collects and needs to be removed, another thoracentesis may be done later. If the doctor sent the fluid to a lab for testing, it may take several days to get the results. The doctor or nurse will discuss the results with you. This care sheet gives you a general idea about how long it will take for you to recover. But each person recovers at a different pace. Follow the steps below to feel better as quickly as possible. How can you care for yourself at home? Activity    · Rest when you feel tired. Getting enough sleep will help you recover.     · Avoid strenuous activities, such as bicycle riding, jogging, weight lifting, or aerobic exercise, until your doctor says it is okay.     · You may shower. Do not take a bath until the puncture site has healed, or until your doctor tells you it is okay.     · Ask your doctor when you can drive again.     · You may need to take 1 or 2 days off from work.  It depends on the type of work you do and how you feel. Diet    · You can eat your normal diet.     · Drink plenty of fluids (unless your doctor tells you not to). Medicines    · Your doctor will tell you if and when you can restart your medicines. He or she will also give you instructions about taking any new medicines.     · If you take aspirin or some other blood thinner, ask your doctor if and when to start taking it again. Make sure that you understand exactly what your doctor wants you to do.     · Be safe with medicines. Take pain medicines exactly as directed. ? If the doctor gave you a prescription medicine for pain, take it as prescribed. ? If you are not taking a prescription pain medicine, ask your doctor if you can take an over-the-counter medicine. ? Do not take two or more pain medicines at the same time unless the doctor told you to. Many pain medicines have acetaminophen, which is Tylenol. Too much acetaminophen (Tylenol) can be harmful.     · If you think your pain medicine is making you sick to your stomach:  ? Take your medicine after meals (unless your doctor has told you not to). ? Ask your doctor for a different pain medicine.     · If your doctor prescribed antibiotics, take them as directed. Do not stop taking them just because you feel better. You need to take the full course of antibiotics. Care of the puncture site    · Wash the area daily with warm, soapy water, and pat it dry. Don't use hydrogen peroxide or alcohol, which may delay healing. You may cover the area with a gauze bandage if it weeps or rubs against clothing. Change the bandage every day.     · Keep the area clean and dry. Follow-up care is a key part of your treatment and safety. Be sure to make and go to all appointments, and call your doctor if you are having problems. It's also a good idea to know your test results and keep a list of the medicines you take. When should you call for help?    Call 911 anytime you think you may need emergency care. For example, call if:    · You passed out (lost consciousness).     · You have severe trouble breathing.     · You have sudden chest pain and shortness of breath, or you cough up blood. Call your doctor now or seek immediate medical care if:    · You have shortness of breath that is new or getting worse.     · You have new or worse pain in your chest, especially when you take a deep breath.     · You are sick to your stomach or cannot keep fluids down.     · You have a fever over 100°F.     · Bright red blood has soaked through the bandage over your puncture site.     · You have signs of infection, such as:  ? Increased pain, swelling, warmth, or redness. ? Red streaks leading from the puncture site. ? Pus draining from the puncture site. ? Swollen lymph nodes in your neck, armpits, or groin. ? A fever.     · You cough up a lot more mucus than normal, or your mucus changes color. Watch closely for changes in your health, and be sure to contact your doctor if you have any problems. Where can you learn more? Go to http://www.gray.com/  Enter Q755 in the search box to learn more about \"Thoracentesis: What to Expect at Home. \"  Current as of: October 26, 2020               Content Version: 12.8  © 9949-8502 [employer]. Care instructions adapted under license by [employer] (which disclaims liability or warranty for this information). If you have questions about a medical condition or this instruction, always ask your healthcare professional. [place] [address] any warranty or liability for your use of this information. Please keep bandage dry for next 24 hours and keep all follow-up appointments. Please call [facility] @ [phone] for any questions or concerns.
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
## 2021-08-16 NOTE — PROGRESS NOTES
Pt sat up on side of bed for thoracentesis. Consent obtained. Time out performed. Pts vitals monitored throughout procedure. Right ultrasound done and pic taken of pleural fluid. ~1600 ml Bloody pleural fluid from right. Pt tolerated procedure well with no adverse rxn. Specimens sent to the lab x 3 and labeled appropriately. Site dressed appropriately and report given to pts RN. Lung sliding done and ultrasound findings reviewed by MD    Discharge instructions reviewed with pt and then pt wheeled over for CT of chest via stretcher.
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
## 2021-09-02 NOTE — PROGRESS NOTES
9/2/21 saw pt today with Dr. Aniya Yang for initial medical oncology consult for hilar mass. PET imaging reviewed. He has been seen by rad onc and CT sim is scheduled for 9/8. Lymph node bx scheduled for tomorrow. Will send tissue for [facility]. MRI also scheduled. Follow up 9/23 to discuss results and treatment options. Provided opportunity to ask questions and all were discussed. Provided navigation [place] contact information. Navigation will continue to follow.

## 2021-09-03 NOTE — DISCHARGE INSTRUCTIONS
[address]  Department of Interventional Radiology  [facility]  [phone] Office  [phone] Fax    BIOPSY DISCHARGE INSTRUCTIONS    General Instructions:     A biopsy is the removal of a small piece of tissue for microscopic examination or testing. Healthy tissue can be obtained for the purpose of tissue-type matching for transplants. Unhealthy tissues are more commonly biopsied to diagnose disease. Lung Biopsy:     A needle lung biopsy is performed when there is a mass discovered in the lung area. The most serious risk is infection, bleeding, and pneumothorax (a collapsed lung). Signs of pneumothorax include shortness of breath, rapid heart rate, and blueness of the skin. If any of these occur, call 911. Liver Biopsy: This test helps detect cancer, infections, and the cause of an enlargement of the liver or elevated liver enzymes. It also helps to diagnose a number of liver diseases. The pain associated with the procedure may be felt in the shoulder. The risks include internal bleeding, pneumothorax, and injury to the surrounding organs. Renal Biopsy: This procedure is sometimes done to evaluate a transplanted kidney. It is also used to evaluate unexplained decrease in kidney function, blood, or protein in the urine. You may see bright red blood in the urine the first 24 hours after the test. If the bleeding lasts longer, you need to call your doctor. There is a risk of infection and bleeding into the muscle, which may cause soreness. Spinal Biopsy: This test is sometimes done in conjunction with other procedures. Your back will be sore, as we are taking a small sample of bone, which is slightly more difficult to sample than tissue. General Biopsy:     A mass can grow in any area of the body, and we are taking a specimen as ordered by your doctor. The risks are the same.  They include bleeding, pain, and infection. Home Care Instructions: You may resume your regular diet and medication regimen. Do not drink alcohol, drive, or make any important legal decisions in the next 24 hours. Do not lift anything heavier than a gallon of milk until the soreness goes away. You may use over the counter acetaminophen or ibuprofen for the soreness. You may apply an ice pack to the affected area for 20-30 minutes at time for the first 24 hours. After that, you may apply a heat pack. Call If: You should call your Physician and/or the Radiology Nurse if you have any questions or concerns about the biopsy site. Call if you should have increased pain, fever, redness, drainage, or bleeding more than a small spot on the bandage. Follow-Up Instructions: Please see your ordering doctor as he/she has requested. To Reach Us: If you have any questions about your procedure, please call the Interventional Radiology department at [phone]. After business hours (5pm) and weekends, call the answering service at [phone] and ask for the Radiologist on call to be paged. Interventional Radiology General Nurse Discharge    After general anesthesia or intravenous sedation, for 24 hours or while taking prescription Narcotics:  · Limit your activities  · Do not drive and operate hazardous machinery  · Do not make important personal or business decisions  · Do  not drink alcoholic beverages  · If you have not urinated within 8 hours after discharge, please contact your surgeon on call. * Please give a list of your current medications to your Primary Care Provider. * Please update this list whenever your medications are discontinued, doses are     changed, or new medications (including over-the-counter products) are added. * Please carry medication information at all times in case of emergency situations.     These are general instructions for a healthy lifestyle:    No smoking/ No tobacco products/ Avoid exposure to second hand smoke  Surgeon General's Warning:  Quitting smoking now greatly reduces serious risk to your health. Obesity, smoking, and sedentary lifestyle greatly increases your risk for illness  A healthy diet, regular physical exercise & weight monitoring are important for maintaining a healthy lifestyle    You may be retaining fluid if you have a history of heart failure or if you experience any of the following symptoms:  Weight gain of 3 pounds or more overnight or 5 pounds in a week, increased swelling in our hands or feet or shortness of breath while lying flat in bed. Please call your doctor as soon as you notice any of these symptoms; do not wait until your next office visit. Recognize signs and symptoms of STROKE:  F-face looks uneven    A-arms unable to move or move unevenly    S-speech slurred or non-existent    T-time-call 911 as soon as signs and symptoms begin-DO NOT go       Back to bed or wait to see if you get better-TIME IS BRAIN.           Patient Signature:  Date: 9/3/2021  Discharging Nurse: John Chahal RN

## 2021-09-10 NOTE — PROGRESS NOTES
Patient: Iam Garcia MRN: [id number]  SSN: [id number]    YOB: 1955  Age: 73 y.o. Sex: male      DIAGNOSIS:  Metastatic small cell carcinoma    TREATMENT SITE:  L rib    DOSE and FRACTIONATION:  800 cGy in 1 fraction    INTERVAL HISTORY:  Iam Garcia is a 73 y.o. male being treated for rib mets. He presented today with worsening dyspnea but no hemoptysis. OBJECTIVE:  Unsteady on his feed; little to no breath sounds on the right; clear breath sounds on the left; heart rate tachy   Visit Vitals  /69 (BP 1 Location: Left upper arm, BP Patient Position: Sitting)   Pulse (!) 121   Temp 98.3 °F (36.8 °C)   Resp 18   Wt 79.8 kg (176 lb)   SpO2 99%   BMI 24.55 kg/m²       No results found for: NA, K, CL, CO2, AGAP, GLU, BUN, CREA, GFRAA, GFRNA, CA, MG, PHOS, ALB, TBIL, TP, ALB, GLOB, AGRAT, ALT  No results found for: WBC, HGB, HCT, PLT, HGBEXT, HCTEXT, PLTEXT    ASSESSMENT and PLAN:  Iam Garcia is tolerating radiation as anticipated for the current dose and fraction. Called EMS for patient to be evaluated at ED as he has had a history of pleural effusions and may need another thoracentesis.        James Rees MD   September 10, 2021

## 2021-09-10 NOTE — ED TRIAGE NOTES
Pt arrives via EMS from [facility] after receiving radiation to his chest, pt unsure if he has cancer. Pt reports fatigue and nausea. EMS reports decreased lung sounds on R side, hx of pleural effusion. Pt denies any shortness of breath. Spo2 100% on RA. /70, , . Temp 98. 5. pt almost completely blind.

## 2021-09-23 PROBLEM — C34.91 SMALL CELL LUNG CARCINOMA, RIGHT (HCC): Status: ACTIVE | Noted: 2021-01-01

## 2021-09-23 PROBLEM — E86.0 DEHYDRATION: Status: ACTIVE | Noted: 2021-01-01

## 2021-09-23 PROBLEM — R11.2 NAUSEA & VOMITING: Status: ACTIVE | Noted: 2021-01-01

## 2021-09-23 PROBLEM — R11.0 NAUSEA: Status: ACTIVE | Noted: 2021-01-01

## 2021-09-23 PROBLEM — E87.6 HYPOKALEMIA: Status: ACTIVE | Noted: 2021-01-01

## 2021-09-23 NOTE — PROGRESS NOTES
Arrived to the [facility]. Patient received 1L IVF, Zofran/Dex IV and KCL 20meq IV. All were tolerated well. Any issues or concerns during appointment: did not like being here for extended period of time  Patient given education on all meds given  Patient has AVS with scheduled appointments on it. Went over this with patient. Discharged via w/c - patient uses cane.

## 2021-09-27 NOTE — PROGRESS NOTES
I spoke with Milagro Ruelas regarding his [place] [facility] Medicare insurance coverage, potential oral medication authorizations, enrollment in the [employer] ([facility]) and the [address] [place] [address] ([address]), and assistance organization resource sheet. The patient would like to review the cancer programs. Next, I spoke with Milagro Ruelas regarding the Oncology Care Model Notification Letter. I answered questions regarding the costs associated with Medicare Benefits. I explained to Milagro Ruelas the estimated cost of treatment and services for six months under the Medicare billing policies. Milagro Ruelas was advised to contact Medicare or their healthcare provider for questions or concerns related to service of care. The Oncology Care Model provides different options of contact for Milagro Ruelas regarding concerns and complaints of treatments and services. The cost of 6 months of medical treatments and services can be estimated to be $7,311.00. Next, I spoke with Milagro Ruelas regarding his Prescription Drug Benefits. Next, I spoke with Milagro Ruelas about the financial assistance application. Next, I spoke with Mialgro Ruelas about billing questions and treatment services. We discussed copayments, deductibles, and out of pocket maximums. We discussed costs associated with Tecentriq, Carboplatin, and Etoposide Medications. Next, I spoke with Milagro Ruelas regarding the Limited Power of [employer] document. After reading the form, Milagro Ruelas signed the Limited Power of  document. Next, I spoke with Milagro uRelas regarding potential oral medication authorizations. I told him that if he ever had any problems getting his oral medications filled to give the dedicated [facility]  a call. Most of the time, it is simply an authorization that needs to be done with the insurance company.   Lastly, I gave Milagro Ruelas a form with various resource organizations that could assist with specific needs (example:  transportation, lodging, preparing meals, home cleaning)    Hollie Whitley expressed understanding of the information above and all questions were answered to his satisfaction.

## 2021-09-29 PROBLEM — C34.90 LUNG CANCER (HCC): Status: ACTIVE | Noted: 2021-01-01

## 2021-09-29 PROBLEM — Z75.1: Status: ACTIVE | Noted: 2021-01-01

## 2021-09-29 NOTE — PROGRESS NOTES
Arrived to the [facility]. 1L IV NS and 8mg zofran completed. Patient tolerated well. Any issues or concerns during appointment: very weak and nauseated today, no chemo today and patient being admitted to hospital per MD. Dr. Andres Luoos in with patient to update and discuss. PIV flushed and left in place for admission. Discharged via wheelchair to home, patient will be driving self to hospital pending rapid COVID-19 test results.

## 2021-09-29 NOTE — PROGRESS NOTES
09/29/21 1733   Dual Skin Pressure Injury Assessment   Dual Skin Pressure Injury Assessment WDL   Second Care Provider (Based on [address] [place] [address]) Jacquelyne Gitelman., RN   Skin Integumentary   Skin Integumentary (WDL) WDL    Pressure  Injury Documentation No Pressure Injury Noted-Pressure Ulcer Prevention Initiated   Skin Color Appropriate for ethnicity   Skin Condition/Temp Warm;Dry;Fragile   Skin Integrity Intact   Turgor Epidermis thin w/ loss of subcut tissue   Hair Growth Present   Nails WDL   Varicosities Absent

## 2021-09-29 NOTE — H&P
[facility]        Inpatient Hematology / Oncology History and Physical    Reason for Admission:  tachycardia and lung cancer     History of Present Illness:  Mr. Pj Rico is a 73 y.o. male admitted on 9/29/21. He is here today for follow up and consideration of cycle 1 Carbo/Etop/Tecentriq. He has been doing pretty poorly since last seen. He notes ongoing nausea not controlled. He has been using Norco for pain and notes ongoing pain despite the Norco. Palliative care added Ms Loza today. He notes ongoing weakness as well. He is in a wheelchair. He has family, but states that they are not really able to help him while undergoing treatment. He is using Ensure for supplementation. Labs reviewed and acceptable for treatment. D/t his ongoing pain and nausea, we will admit for cycle 1 of treatment.        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, blind in left eye,  deaf in left ear,\"every bone in skull was broken\"     Past Surgical History:   Procedure Laterality Date    HX APPENDECTOMY      HX HEENT  9/2009    facial reconstruction post trauma     Family History   Problem Relation Age of Onset    Diabetes Maternal Aunt     Diabetes Paternal Aunt      Social History     Socioeconomic History    Marital status:      Spouse name: Not on file    Number of children: Not on file    Years of education: Not on file    Highest education level: Not on file   Occupational History    Not on file   Tobacco Use    Smoking status: Current Every Day Smoker     Packs/day: 0.50     Years: 30.00     Pack years: 15.00    Smokeless tobacco: Never Used   Substance and Sexual Activity    Alcohol use: Not Currently Alcohol/week: 0.8 standard drinks     Types: 1 Cans of beer per week    Drug use: No    Sexual activity: Not on file   Other Topics Concern     Service Not Asked    Blood Transfusions Not Asked    Caffeine Concern Not Asked    Occupational Exposure Not Asked    Hobby Hazards Not Asked    Sleep Concern Not Asked    Stress Concern Not Asked    Weight Concern Not Asked    Special Diet Not Asked    Back Care Not Asked    Exercise Not Asked    Bike Helmet Not Asked   [address] Not Asked    Self-Exams Not Asked   Social History Narrative    Not on file     Social Determinants of Health     Financial Resource Strain:     Difficulty of Paying Living Expenses:    Food Insecurity:     Worried About Running Out of Food in the Last Year:     [address] in the Last Year:    Transportation Needs:     Lack of Transportation (Medical):  Lack of Transportation (Non-Medical):    Physical Activity:     Days of Exercise per Week:     Minutes of Exercise per Session:    Stress:     Feeling of Stress :    Social Connections:     Frequency of Communication with Friends and Family:     Frequency of Social Gatherings with Friends and Family:     Attends [employer] Services:     Active Member of Clubs or Organizations:     Attends Club or Organization Meetings:     Marital Status:    Intimate Partner Violence:     Fear of Current or Ex-Partner:     Emotionally Abused:     Physically Abused:     Sexually Abused:      Current Outpatient Medications   Medication Sig Dispense Refill    albuterol (PROVENTIL HFA, VENTOLIN HFA, PROAIR HFA) 90 mcg/actuation inhaler       Advair Diskus 250-50 mcg/dose diskus inhaler       fluticasone propionate (FLONASE) 50 mcg/actuation nasal spray       morphine CR (MS CONTIN) 15 mg CR tablet Take 1 Tablet by mouth every twelve (12) hours for 30 days.  Max Daily Amount: 30 mg. 60 Tablet 0    HYDROcodone-acetaminophen (Norco)  mg tablet Take 1 Tablet by mouth every four (4) hours for 30 days. Max Daily Amount: 6 Tablets. 180 Tablet 0    prochlorperazine (Compazine) 10 mg tablet Take 1 Tablet by mouth every six (6) hours as needed for Nausea. Indications: nausea and vomiting caused by cancer drugs, nausea and vomiting 90 Tablet 3    lidocaine-prilocaine (EMLA) topical cream Apply  to affected area as needed for Pain. 30 g 0    mirtazapine (REMERON) 15 mg tablet Take 1 Tablet by mouth nightly. 30 Tablet 2    amLODIPine (Norvasc) 10 mg tablet Take 10 mg by mouth daily.  pantoprazole (PROTONIX) 40 mg tablet Take 40 mg by mouth daily.  aspirin delayed-release 81 mg tablet Take 81 mg by mouth daily. EC      furosemide (LASIX) 20 mg tablet Take 20 mg by mouth daily.  potassium chloride SR (Klor-Con 10) 10 mEq tablet Take 10 mEq by mouth daily.  ondansetron hcl (ZOFRAN) 4 mg tablet Take 4 mg by mouth every eight (8) hours as needed for Nausea or Vomiting.  gabapentin (NEURONTIN) 300 mg capsule Take 300 mg by mouth three (3) times daily.  doxycycline (MONODOX) 100 mg capsule Take 100 mg by mouth two (2) times a day.  moxifloxacin (VIGAMOX) 0.5 % ophthalmic solution Administer 1 Drop to right eye four (4) times daily. Right eye. Patient has prescription from Dr Shree Day- to be filled. Facility-Administered Medications Ordered in Other Encounters   Medication Dose Route Frequency Provider Last Rate Last Admin    0.9% sodium chloride infusion 1,000 mL  1,000 mL IntraVENous CONTINUOUS Leora Lewis NP   IV Completed at 21 1113    saline peripheral flush soln 10 mL  10 mL InterCATHeter PRN Contreras Proctor MD   10 mL at 21 1115       OBJECTIVE:  No data found. Temp (24hrs), Av.6 °F (36.4 °C), Min:97.6 °F (36.4 °C), Max:97.6 °F (36.4 °C)    No intake/output data recorded. Physical Exam:  Constitutional:  Ill appearing male in mild distress, sitting comfortably in the hospital bed.     HEENT: Normocephalic and atraumatic. Oropharynx is clear, mucous membranes are moist.  Pupils are equal, round, and reactive to light. Extraocular muscles are intact. Sclerae anicteric. Neck supple without JVD. No thyromegaly present. Lymph node   Deferred    Skin Warm and dry. No bruising and no rash noted. No erythema. No pallor. Respiratory Lungs are clear to auscultation bilaterally without wheezes, rales or rhonchi, normal air exchange without accessory muscle use. CVS Tachy rate, regular rhythm and normal S1 and S2. No murmurs, gallops, or rubs. Abdomen Soft, nontender and nondistended, normoactive bowel sounds. No palpable mass. No hepatosplenomegaly. Neuro Grossly nonfocal with no obvious sensory or motor deficits. MSK Normal range of motion in general.  No edema and no tenderness. Psych Appropriate mood and affect. Labs:    Recent Results (from the past 24 hour(s))   CBC WITH AUTOMATED DIFF    Collection Time: 09/29/21  8:08 AM   Result Value Ref Range    WBC 11.7 (H) 4.3 - 11.1 K/uL    RBC 4.24 4.23 - 5.6 M/uL    HGB 10.3 (L) 13.6 - 17.2 g/dL    HCT 32.5 %    MCV 76.7 (L) 79.6 - 97.8 FL    MCH 24.3 (L) 26.1 - 32.9 PG    MCHC 31.7 31.4 - 35.0 g/dL    RDW 16.2 (H) 11.9 - 14.6 %    PLATELET 343 (H) 280 - 450 K/uL    MPV 9.4 9.4 - 12.3 FL    ABSOLUTE NRBC 0.00 0.0 - 0.2 K/uL    NEUTROPHILS 69 43 - 78 %    LYMPHOCYTES 11 (L) 13 - 44 %    MONOCYTES 19 (H) 4.0 - 12.0 %    EOSINOPHILS 0 (L) 0.5 - 7.8 %    BASOPHILS 0 0.0 - 2.0 %    IMMATURE GRANULOCYTES 1 0.0 - 5.0 %    ABS. NEUTROPHILS 8.0 1.7 - 8.2 K/UL    ABS. LYMPHOCYTES 1.3 0.5 - 4.6 K/UL    ABS. MONOCYTES 2.2 (H) 0.1 - 1.3 K/UL    ABS. EOSINOPHILS 0.1 0.0 - 0.8 K/UL    ABS. BASOPHILS 0.1 0.0 - 0.2 K/UL    ABS. IMM.  GRANS. 0.1 0.0 - 0.5 K/UL    DF AUTOMATED     METABOLIC PANEL, COMPREHENSIVE    Collection Time: 09/29/21  8:08 AM   Result Value Ref Range    Sodium 133 (L) 136 - 145 mmol/L    Potassium 3.3 (L) 3.5 - 5.1 mmol/L    Chloride 98 98 - 107 mmol/L    CO2 25 21 - 32 mmol/L    Anion gap 10 7 - 16 mmol/L    Glucose 93 65 - 100 mg/dL    BUN 12 8 - 23 MG/DL    Creatinine 0.62 (L) 0.8 - 1.5 MG/DL    GFR est AA >60 >60 ml/min/1.73m2    GFR est non-AA >60 >60 ml/min/1.73m2    Calcium 10.0 8.3 - 10.4 MG/DL    Bilirubin, total 0.6 0.2 - 1.1 MG/DL    ALT (SGPT) 10 (L) 12 - 65 U/L    AST (SGOT) 38 (H) 15 - 37 U/L    Alk. phosphatase 78 50 - 136 U/L    Protein, total 8.9 (H) 6.3 - 8.2 g/dL    Albumin 2.6 (L) 3.2 - 4.6 g/dL    Globulin 6.3 (H) 2.3 - 3.5 g/dL    A-G Ratio 0.4 (L) 1.2 - 3.5     MAGNESIUM    Collection Time: 09/29/21  8:08 AM   Result Value Ref Range    Magnesium 1.6 (L) 1.8 - 2.4 mg/dL   TSH 3RD GENERATION    Collection Time: 09/29/21  8:08 AM   Result Value Ref Range    TSH 1.030 uIU/mL   HEP B SURFACE AB    Collection Time: 09/29/21  8:08 AM   Result Value Ref Range    Hepatitis B surface Ab <3.10 mIU/mL   IMMUNOGLOBULIN G, QT    Collection Time: 09/29/21  8:08 AM   Result Value Ref Range    Immunoglobulin G 1,630 (H) 700 - 1,600 mg/dL   MISC.  LAB TEST    Collection Time: 09/29/21  8:08 AM   Result Value Ref Range    Test Description: ANTIBODY TO HEPATITIS B SURFACE ANTIGEN (ANTI     Reference Lab: CANCELLED BY LAB      Results: PENDING    HEPATITIS PANEL, ACUTE    Collection Time: 09/29/21  8:08 AM   Result Value Ref Range    Hepatitis A, IgM NONREACTIVE NR      Hepatitis B core, IgM NONREACTIVE NR      Hep B Surface Ag NONREACTIVE NR      Hepatitis C virus Ab NONREACTIVE NR     COVID-19 RAPID TEST    Collection Time: 09/29/21 12:30 PM   Result Value Ref Range    Specimen source Nasopharyngeal      COVID-19 rapid test Not detected NOTD         Imaging:  NA  ASSESSMENT:  Problem List  Date Reviewed: 9/29/2021        Codes Class Noted    Listed for admission to hospital ICD-10-CM: Z75.1  ICD-9-CM: V63.2  9/29/2021        Dehydration ICD-10-CM: E86.0  ICD-9-CM: 276.51  9/23/2021        Nausea & vomiting ICD-10-CM: R11.2  ICD-9-CM: 787.01  9/23/2021 Nausea ICD-10-CM: R11.0  ICD-9-CM: 787.02  9/23/2021        Hypokalemia ICD-10-CM: E87.6  ICD-9-CM: 276.8  9/23/2021        Small cell lung carcinoma, right (HCC) ICD-10-CM: C34.91  ICD-9-CM: 162.9  9/23/2021        Pleural effusion on right ICD-10-CM: J90  ICD-9-CM: 511.9  8/16/2021        Mediastinal mass ICD-10-CM: J98.59  ICD-9-CM: 786.6  8/16/2021        Shortness of breath ICD-10-CM: R06.02  ICD-9-CM: 786.05  8/16/2021        Axillary adenopathy ICD-10-CM: R59.0  ICD-9-CM: 785.6  8/16/2021        Adrenal nodule ([address] ICD-10-CM: E27.8  ICD-9-CM: 255.8  8/16/2021                PLAN:  Small cell carcinoma  - start C1 of Carbo/Etop/Tecentriq while inpatient tomorrow if nausea and pain is improved. Intractable pain  -Seen by palliative care. Start Ms Contin and continue Norco    Nausea  -Antiemetics     Lilly SOPs  Continue home medications       Thank you for allowing us to participate in the care of Mr. Glenys Delatorre.                  Maureen Fried, [address] [facility]iation Oncology   [address] [place] [address]  Office : [phone]  Fax : [phone]

## 2021-09-29 NOTE — PROGRESS NOTES
09/29/21 1733   Dual Skin Pressure Injury Assessment   Dual Skin Pressure Injury Assessment WDL   Second Care Provider (Based on [address] [place] [address]) RICH Candelaria   Skin Integumentary   Skin Integumentary (WDL) WDL    Pressure  Injury Documentation No Pressure Injury Noted-Pressure Ulcer Prevention Initiated   Skin Color Appropriate for ethnicity   Skin Condition/Temp Warm;Dry;Fragile   Skin Integrity Intact   Turgor Epidermis thin w/ loss of subcut tissue   Hair Growth Present   Nails WDL   Varicosities Absent

## 2021-09-30 NOTE — PROGRESS NOTES
PICC Placement Note    PRE-PROCEDURE VERIFICATION  Correct Procedure: yes. Time out completed with assistant Reta Ramirez RN and all persons present in agreement with time out. Correct Site:  yes  Temperature: Temp: 98.2 °F (36.8 °C), Temperature Source: Temp Source: Oral  Recent Labs     09/30/21  0532   BUN 10   CREA 0.56*      WBC 7.6     Allergies: Patient has no known allergies. Education materials for [facility] given to patient or family. PROCEDURE DETAIL  A double lumen PICC line was started for vascular access. The following documentation is in addition to the PICC properties in the lines/airways flowsheet :  Lot #: [id number]  xylocaine used: yes  Mid-Arm Circumference: 31 (cm)  Internal Catheter Length: 41 (cm)  Internal Catheter Total Length: 43 (cm)  Vein Selection for PICC:right basilic  Central Line Bundle followed yes  Complication Related to Insertion: none    Both the insertion guidewire and ECG guidewire were removed intact all ports have positive blood return and were flush well with normal saline. The location of the tip of the PICC is verified using ECG technology. The tip is in the SVC per ECG reading. See image below.      Line is okay to use: yes

## 2021-09-30 NOTE — PROGRESS NOTES
Comprehensive Nutrition Assessment    Type and Reason for Visit: Initial, Positive nutrition screen  Best Practice Alert for Malnutrition Screening Tool: Recently Lost Weight Without Trying: Yes, If Yes, How Much Weight Loss: >33 lbs, Eating Poorly Due to Decreased Appetite: Yes    Nutrition Recommendations/Plan:    Consider marinoli if no improvementin appetite wiih remeron.  Continue Ensure Enlive tid. (No chocolate). Once consistently consuming 100% of ONS, suggests increase as tolerated to 6 bottles per da which would meet 100% of estimated needs.  If po intake remains <25%, would TF be a consideration? .     Malnutrition Assessment:  Malnutrition Status: Severe malnutrition  Context: Chronic illness  Findings of clinical characteristics of malnutrition:   Energy Intake:  7 - 75% or less est energy requirements for 1 month or longer (<25% of estimated needs for 3-4 months)  Weight Loss:  7.0 - Greater than 7.5% over 3 months (183# (8/16/2021) to 165#=9.8 %. Pt reports 226# (May 2021)-unable to verfiy but if accurate=27%)       Nutrition Assessment:   Nutrition History: 9/30: Was eating normally ( at least 2 meals/d) up until the end of May 2021 when had chest pain that negatively impacted his appetite, also with daily nausea. He reports he was weighing 226# at that time. He would eat as much as his appetite would allow,but it amounted to only bites of food. He started drinking 1 bottle of Ensure Enlive per day in June. 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 Recent diagnosis of widely metastatic SCLC. Presented to cancer clinic for start of chemotherapy. Sent d/t ongoing pain and nausea and wale admit for cycle 1 of treatment. Daily Update:  Pt reports he has not had anything to eat since admission because he has no appetite.  He says he has not consumed any Ensure Enlive thus far today because he has had aa very busy day and just hasn't gotten around to it. He has 2 unopened bottles at his bedside. He says he will try to drink as much of it as he can each day. RD advised that needed intake is 6 bottles per day. Denies difficulty chewing or swallowing. Has active order for 15 mg remeron daily and prn zofran             Current Nutrition Therapies:  ADULT DIET Regular  ADULT ORAL NUTRITION SUPPLEMENT Breakfast, Lunch, Dinner; Standard High Calorie/High Protein    Current Intake:   Average Meal Intake: 0% (Pt reports no meal intake since admissiom) Average Supplement Intake: 0% (Pt stated)      Anthropometric Measures:  Height: 5' 9\" (175.3 cm)  Current Body Wt: 74.8 kg (164 lb 14.5 oz) (9/29), Weight source: Not specified  BMI: 24.3, Normal weight (BMI 22.0-24.9) age over 72  Admission Body Weight: 164 lb 14.5 oz (source not specified)  Ideal Body Weight (lbs) (Calculated): 160 lbs (73 kg),    Usual Body Wt:  (183# 8/16 per EMR. Pt stated 226# in May 2021.), Percent weight change:            Edema: No data recorded   Estimated Daily Nutrient Needs:  Energy (kcal/day): 3213-0922 (Kcal/kg (25-30), Weight Used: Admission (74.8 kg 9/29 source not specified))  Protein (g/day):  Weight Used: ( (20% of kcal))  Fluid (ml/day):   (1 ml/kcal)    Nutrition Diagnosis:   · Severe malnutrition, In context of chronic illness related to inadequate protein-energy intake as evidenced by  (as above)    · Inadequate oral intake related to  (poor appetite, nausea) as evidenced by  (intake <25% of estimated needs)    Nutrition Interventions:   Food and/or Nutrient Delivery: Continue current diet, Continue oral nutrition supplement     Coordination of Nutrition Care: Continue to monitor while inpatient    Goals: Active Goal: Intake >50% of estimated needs by follow up    Nutrition Monitoring and Evaluation:      Food/Nutrient Intake Outcomes: Food and nutrient intake, Supplement intake  Physical Signs/Symptoms Outcomes:  Other (specify), Nausea/vomiting    Discharge Planning:    Continue oral nutrition supplement    Gio Mohr RD, LD, [facility] [phone]    Disaster Mode Active

## 2021-09-30 NOTE — PROGRESS NOTES
[facility] Hematology & Oncology        Inpatient Hematology / Oncology Progress Note      Admission Date: 2021  5:10 PM  Reason for Admission/Hospital Course: Lung cancer ([facility] [C34.90]      24 Hour Events:  VSS  Labs stable  Need PICC line  D1C1 carbo/etop      ROS:  Constitutional: negative for fever, chills. + weakness, malaise, fatigue. CV: negative for chest pain, palpitations, edema. Respiratory: negative for dyspnea, cough, wheezing. GI: negative for abdominal pain, diarrhea.+nausea    10 point review of systems is otherwise negative with the exception of the elements mentioned above in the HPI. No Known Allergies    OBJECTIVE:  Patient Vitals for the past 8 hrs:   BP Temp Pulse Resp SpO2   21 0819 100/69 97.4 °F (36.3 °C) 97 18 100 %     Temp (24hrs), Av.3 °F (36.8 °C), Min:97.4 °F (36.3 °C), Max:99.5 °F (37.5 °C)    No intake/output data recorded. Physical Exam:  Constitutional: Ill appearing  male in no acute distress, sitting comfortably in the hospital bed. HEENT: Normocephalic and atraumatic. Oropharynx is clear, mucous membranes are moist.  Pupils are equal, round, and reactive to light. Extraocular muscles are intact. Sclerae anicteric. Skin Warm and dry. No bruising and no rash noted. No erythema. No pallor. Respiratory Lungs are clear to auscultation bilaterally without wheezes, rales or rhonchi, normal air exchange without accessory muscle use. CVS Normal rate, regular rhythm and normal S1 and S2. No murmurs, gallops, or rubs. Abdomen Soft, nontender and nondistended, normoactive bowel sounds. Neuro Grossly nonfocal with no obvious sensory or motor deficits. MSK Normal range of motion in general.  No edema and no tenderness. Psych Appropriate mood and affect.         Labs:      Recent Labs     21  0532 21  0808   WBC 7.6 11.7*   RBC 3.63* 4.24   HGB 8.9* 10.3*   HCT 28.4* 32.5   MCV 78.2* 76.7*   MCH 24.5* 24.3*   MCHC 31.3* 31.7 RDW 16.2* 16.2*    480*   GRANS 57 69   LYMPH 16 11*   MONOS 24* 19*   EOS 2 0*   BASOS 1 0   IG 1 1   DF AUTOMATED AUTOMATED   ANEU 4.3 8.0   ABL 1.2 1.3   ABM 1.8* 2.2*   SAVANA 0.1 0.1   ABB 0.1 0.1   AIG 0.1 0.1        Recent Labs     09/30/21  0532 09/29/21  0808   * 133*   K 3.2* 3.3*    98   CO2 27 25   AGAP 6* 10   GLU 79 93   BUN 10 12   CREA 0.56* 0.62*   GFRAA >60 >60   GFRNA >60 >60   CA 9.1 10.0   AP 65 78   TP 7.6 8.9*   ALB 2.1* 2.6*   GLOB 5.5* 6.3*   AGRAT 0.4* 0.4*   MG 1.7* 1.6*         Imaging:    Medications:  Current Facility-Administered Medications   Medication Dose Route Frequency    potassium chloride (K-DUR, KLOR-CON) SR tablet 20 mEq  20 mEq Oral BID    aspirin delayed-release tablet 81 mg  81 mg Oral DAILY    furosemide (LASIX) tablet 20 mg  20 mg Oral DAILY    gabapentin (NEURONTIN) capsule 300 mg  300 mg Oral TID    HYDROcodone-acetaminophen (NORCO)  mg tablet 1 Tablet  1 Tablet Oral Q4H    mirtazapine (REMERON) tablet 15 mg  15 mg Oral QHS    morphine CR (MS CONTIN) tablet 15 mg  15 mg Oral Q12H    pantoprazole (PROTONIX) tablet 40 mg  40 mg Oral DAILY    prochlorperazine (COMPAZINE) with saline injection 5 mg  5 mg IntraVENous Q6H PRN    ondansetron (ZOFRAN) injection 4 mg  4 mg IntraVENous Q4H PRN    0.9% sodium chloride infusion  75 mL/hr IntraVENous CONTINUOUS    enoxaparin (LOVENOX) injection 40 mg  40 mg SubCUTAneous Q24H    amLODIPine (NORVASC) tablet 5 mg  5 mg Oral DAILY     Facility-Administered Medications Ordered in Other Encounters   Medication Dose Route Frequency    0.9% sodium chloride infusion 1,000 mL  1,000 mL IntraVENous CONTINUOUS    saline peripheral flush soln 10 mL  10 mL InterCATHeter PRN         ASSESSMENT:    Problem List  Date Reviewed: 9/29/2021        Codes Class Noted    Listed for admission to hospital ICD-10-CM: Z75.1  ICD-9-CM: V63.2  9/29/2021        Lung cancer ([facility] ICD-10-CM: C34.90  ICD-9-CM: 162.9 9/29/2021        Dehydration ICD-10-CM: E86.0  ICD-9-CM: 276.51  9/23/2021        Nausea & vomiting ICD-10-CM: R11.2  ICD-9-CM: 787.01  9/23/2021        Nausea ICD-10-CM: R11.0  ICD-9-CM: 787.02  9/23/2021        Hypokalemia ICD-10-CM: E87.6  ICD-9-CM: 276.8  9/23/2021        Small cell lung carcinoma, right (HCC) ICD-10-CM: C34.91  ICD-9-CM: 162.9  9/23/2021        Pleural effusion on right ICD-10-CM: J90  ICD-9-CM: 511.9  8/16/2021        Mediastinal mass ICD-10-CM: J98.59  ICD-9-CM: 786.6  8/16/2021        Shortness of breath ICD-10-CM: R06.02  ICD-9-CM: 786.05  8/16/2021        Axillary adenopathy ICD-10-CM: R59.0  ICD-9-CM: 785.6  8/16/2021        Adrenal nodule ([address] ICD-10-CM: E27.8  ICD-9-CM: 255.8  8/16/2021            Mr. Jabier Robert is a relatively new patient to Dr. Lisa Jones. He was seen earlier this month after an assessment for chest pain and shortness of breath. Scanning showed a pleural effusion on the right ahd a right hilar mass. PET scanning showed diffuse hypermetabolic nodes, a large mediastinal mass, and bone metastases. There was mild hypercalcemia. Biopsy of a left axillary node was positive for SCLC. He was seen today in anticipation of starting therapy with atezolizumab, etoposide and carboplatin. His performance status was noted to be limited and he lives alone without accessible assistance. It was felt to be dangerous to consider therapy under those conditions and a decision was made to admit him. We cannot provide the atezolizumab in the hospital but will proceed with etoposide and Tana. 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 He may need consideration of placement. PLAN:  Small cell carcinoma  - start C1 of Carbo/Etop/Tecentriq while inpatient tomorrow if nausea and pain is improved. 9/30 D1C1 Carbo/Etop     Intractable pain  -Seen by palliative care.  Start Ms Contin and continue Norco  9/30 Pain better controlled     Nausea  -Antiemetics      Lilly SOPs  Continue home medications     Consult CM/may need placement. Per patient he is considering relocating closer to family near [facility]. We will continue to discuss. Goals and plan of care reviewed with the patient. All questions answered to the best of our ability.             Noam Perez NP   [facility] Hematology & Oncology  [address] [place] [address]  Office : [phone]  Fax : [phone]

## 2021-09-30 NOTE — PROGRESS NOTES
Care Management Interventions  PCP Verified by CM: Yes  Last Visit to PCP: 09/27/21  Support Systems: No Support Systems (Lives alone)  Confirm Follow Up Transport: Self  The Plan for Transition of Care is Related to the Following Treatment Goals : Return Home  Discharge Location  Discharge Placement: Unable to determine at this time    SW received a consult for pt to possibly have nursing home placement. SW spoke w/ pt about his d/c plans and pt stated to SW that he has a sister that can come to check on him 2 or 3 times weekly. SW explained to pt that he needs assistance more than 2 or 3 times weekly, then pt stated that he had neighbors that are unemployed and they could help. SW stated to him that he needed stability.  Pt stated to SW that he would consider, SW gave pt a list of facilities to look at and will follow-up w/ pt    RADHA Craven

## 2021-09-30 NOTE — PROGRESS NOTES
Care Management Interventions  PCP Verified by CM: Yes  Last Visit to PCP: 09/27/21  Support Systems: No Support Systems (Lives alone)  Confirm Follow Up Transport: Self  The Plan for Transition of Care is Related to the Following Treatment Goals : Return Home  Discharge Location  Discharge Placement: Unable to determine at this time    SW spoke w/ pt., pt confirmed demographic info. , pt is a 72 y/o male that has been diagnosed w/ lung cancer. Pt lives alone, bathes, dresses himself, and also drives to his Dr. Vicky Maloney. Pt does not own or use any DME at home. Pt's PCP was Dr. Ramakrishna Palumbo, last seen yesterday. Pt stated that he will not be seeing this physician anymore due to his Cancer, will be seen at [facility]. Pt does not have any barriers w/ getting his medications.   SW will follow pt until d/c    RADHA Steele

## 2021-10-01 PROBLEM — E43 SEVERE PROTEIN-CALORIE MALNUTRITION (HCC): Chronic | Status: ACTIVE | Noted: 2021-01-01

## 2021-10-01 NOTE — PROGRESS NOTES
Care Management Interventions  PCP Verified by CM: Yes  Last Visit to PCP: 09/27/21  Support Systems: No Support Systems (Lives alone)  Confirm Follow Up Transport: Self  The Plan for Transition of Care is Related to the Following Treatment Goals : Return Home  Discharge Location  Discharge Placement: Home with home health ([facility])    DARRELL spoke w/ pt and pt would prefer to have [address] instead of going to a [employer]. Pt would like to use [facility] DARRELL Arthur placed referral and notified [facility] HH.  DARRELL will follow pt until d/c    RADHA Landa

## 2021-10-01 NOTE — PROGRESS NOTES
Problem: Mobility Impaired (Adult and Pediatric)  Goal: *Acute Goals and Plan of Care (Insert Text)  Outcome: Progressing Towards Goal  Note:   LTG:  (1.)Mr. Alisa Cisneros will move from supine to sit and sit to supine  in bed with SUPERVISION within 7 treatment day(s). (2.)Mr. Alisa Cisneros will transfer from bed to chair and chair to bed with STAND BY ASSIST using the least restrictive device within 7 treatment day(s). (3.)Mr. Alisa Cisneros will ambulate with STAND BY ASSIST for 200 feet with the least restrictive device within 7 treatment day(s). (4)Mr. Alisa Cisneros will go up a flight of steps CGA with rail and device as needed in 7 days. ________________________________________________________________________________________________       PHYSICAL THERAPY: Initial Assessment and AM 10/1/2021  INPATIENT: PT Visit Days : 1  Payor: [facility] MEDICARE / Plan: [employer] [place] MED[place]/PPO / Product Type: Managed Care Medicare /       NAME/AGE/GENDER: Deanna Manrique is a 73 y.o. male   PRIMARY DIAGNOSIS: Lung cancer ([facility] [C34.90] <principal problem not specified> <principal problem not specified>        ICD-10: Treatment Diagnosis:    Generalized Muscle Weakness (M62.81)  Other abnormalities of gait and mobility (R26.89)   Precaution/Allergies:  Patient has no known allergies. ASSESSMENT:     Mr. Alisa Cisneros presents with general weakness and decreased functional mobility with lung cancer with metastases. He lives alone and is normally independent. He has a flight of steps to his bedroom. He has been using a cane since May. This am, he reports feeling better than when he came in. He ambulated a short distance in the ulloa and had to return to the room due to left LE pain which he reports has been ongoing since he has been sick. He used the IV pole initially but then did not use an assistive device and he is unsteady on his feet. He would benefit from using his cane/or walker with gait. Will follow.   He reports an ongoing discussion about going home vs. Snf as he does not have any support at home. He would prefer to go home. This section established at most recent assessment   PROBLEM LIST (Impairments causing functional limitations):  Decreased Strength  Decreased ADL/Functional Activities  Decreased Transfer Abilities  Decreased Ambulation Ability/Technique  Decreased Balance  Increased Pain  Decreased Activity Tolerance  Increased Fatigue  Decreased Jber with Home Exercise Program   INTERVENTIONS PLANNED: (Benefits and precautions of physical therapy have been discussed with the patient.)  Balance Exercise  Bed Mobility  Family Education  Gait Training  Home Exercise Program (HEP)  Range of Motion (ROM)  Therapeutic Activites  Therapeutic Exercise/Strengthening  Transfer Training     TREATMENT PLAN: Frequency/Duration: daily for duration of hospital stay  Rehabilitation Potential For Stated Goals: Good     REHAB RECOMMENDATIONS (at time of discharge pending progress):    Placement:  HHPT vs. SNF  Equipment:   May need a RW  None at this time              HISTORY:   History of Present Injury/Illness (Reason for Referral): I personally evaluated the patient with Meryle More, N.P.,  and agree with the assessment, findings and plan as documented.  [facility] is a relatively new patient to me. He was seen earlier this month after an assessment for chest pain and shortness of breath. Scanning showed a pleural effusion on the right ahd a right hilar mass. PET scanning showed diffuse hypermetabolic nodes, a large mediastinal mass, and bone metastases. There was mild hypercalcemia. Biopsy of a left axillary node was positive for SCLC. He was seen today in anticipation of starting therapy with atezolizumab, etoposide and carboplatin. His performance status was noted to be limited and he lives alone without accessible assistance.  It was felt to be dangerous to consider therapy under those conditions and a decision was made to admit him. We cannot provide the atezolizumab in the hospital but will proceed with etoposide and Tana. 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 He may need consideration of placement. Past Medical History/Comorbidities:   Mr. Rachel Parry  has a past medical history of Cancer [facility]), Chronic pain, Nausea & vomiting (9/23/2021), Sinusitis (4/2010 ), and Trauma (9/19/2009). He also has no past medical history of Arrhythmia, Arthritis, Asthma, Autoimmune disease ([address] CAD (coronary artery disease), Chronic kidney disease, Coagulation defects, COPD, Diabetes ([address] Difficult intubation, GERD (gastroesophageal reflux disease), Heart failure ([address] Hypertension, Liver disease, Malignant hyperthermia due to anesthesia, Other ill-defined conditions(799.89), Pseudocholinesterase deficiency, Psychiatric disorder, PUD (peptic ulcer disease), Seizures ([address] Stroke ([address] Thromboembolus ([address] Thyroid disease, Unspecified adverse effect of anesthesia, or Unspecified sleep apnea. Mr. Rachel Parry  has a past surgical history that includes hx appendectomy and hx heent (9/2009).   Social History/Living Environment:   Home Environment: Apartment  One/Two Story Residence: Two story  Living Alone: Yes  Support Systems: No Support Systems (Lives alone)  Patient Expects to be Discharged to[de-identified] Apartment  Current DME Used/Available at Home: None  Prior Level of Function/Work/Activity:  Independent with cane     Number of Personal Factors/Comorbidities that affect the Plan of Care: 1-2: MODERATE COMPLEXITY   EXAMINATION:   Most Recent Physical Functioning:   Gross Assessment:  AROM: Within functional limits  Strength: Generally decreased, functional               Posture:     Balance:  Sitting: Intact  Standing: With support Bed Mobility:  Supine to Sit: Stand-by assistance  Sit to Supine: Stand-by assistance  Wheelchair Mobility:     Transfers:  Sit to Stand: Contact guard assistance  Stand to Sit: Contact guard assistance  Duration: 10 Minutes  Gait:     Speed/Miriam: Delayed  Step Length: Left shortened;Right shortened  Stance: Left decreased  Gait Abnormalities: Antalgic;Decreased step clearance  Distance (ft): 45 Feet (ft)  Assistive Device: Other (comment) (IV pole part walk)  Ambulation - Level of Assistance: Contact guard assistance  Interventions: Safety awareness training;Verbal cues      Body Structures Involved:  Bones  Joints  Muscles  Ligaments Body Functions Affected: Movement Related Activities and Participation Affected: Mobility   Number of elements that affect the Plan of Care: 3: MODERATE COMPLEXITY   CLINICAL PRESENTATION:   Presentation: Stable and uncomplicated: LOW COMPLEXITY   CLINICAL DECISION MAKIN[address] [place] [address] AM-PAC 6 Clicks   Basic Mobility Inpatient Short Form  How much difficulty does the patient currently have. .. Unable A Lot A Little None   1. Turning over in bed (including adjusting bedclothes, sheets and blankets)? [] 1   [] 2   [x] 3   [] 4   2. Sitting down on and standing up from a chair with arms ( e.g., wheelchair, bedside commode, etc.)   [] 1   [] 2   [x] 3   [] 4   3. Moving from lying on back to sitting on the side of the bed? [] 1   [] 2   [x] 3   [] 4   How much help from another person does the patient currently need. .. Total A Lot A Little None   4. Moving to and from a bed to a chair (including a wheelchair)? [] 1   [] 2   [] 3   [x] 4   5. Need to walk in hospital room? [] 1   [] 2   [x] 3   [] 4   6. Climbing 3-5 steps with a railing? [] 1   [] 2   [x] 3   [] 4   © 2007, Trustees of [address] [place] [address], under license to [employer]. All rights reserved      Score:  Initial: 19 Most Recent: X (Date: -- )    Interpretation of Tool:  Represents activities that are increasingly more difficult (i.e. Bed mobility, Transfers, Gait).     Medical Necessity:     Patient is expected to demonstrate progress in strength, range of motion, and balance   to   decrease assistance required with exercises and functional mobility  . Reason for Services/Other Comments:  Patient   continues to require present interventions due to patient's inability to perform exercises and functional mobility independently  . Use of outcome tool(s) and clinical judgement create a POC that gives a: Questionable prediction of patient's progress: MODERATE COMPLEXITY            TREATMENT:   (In addition to Assessment/Re-Assessment sessions the following treatments were rendered)   Pre-treatment Symptoms/Complaints:  left LE pain  Pain: Initial:      Post Session:  4   assessment  Therapeutic Activity: (  10 Minutes ):  Therapeutic activities including Bed transfers, Ambulation on level ground, and standing to improve mobility, strength, and balance. Required minimal Safety awareness training;Verbal cues to promote static and dynamic balance in standing. Braces/Orthotics/Lines/Etc:   IV  O2 Device: None (Room air)  Treatment/Session Assessment:    Response to Treatment:  unsteady, left LE pain limiting gait  Interdisciplinary Collaboration:   Registered Nurse  After treatment position/precautions:   Supine in bed  Bed/Chair-wheels locked  Bed in low position  Call light within reach   Compliance with Program/Exercises: Will assess as treatment progresses  Recommendations/Intent for next treatment session: \"Next visit will focus on advancements to more challenging activities and reduction in assistance provided\".   Total Treatment Duration:  PT Patient Time In/Time Out  Time In: 1025  Time Out: [address], PT

## 2021-10-01 NOTE — DISCHARGE SUMMARY
[employer] Hematology & Oncology: Inpatient Hematology / Oncology Discharge Summary Note    Patient ID:  Suzy Vazquez  [id number]  36 y.o.  1955    Admit Date: 9/29/2021    Discharge Date: 10/3/2021    Admission Diagnoses: Lung cancer [facility]) [C34.90]    Discharge Diagnoses:  Principal Diagnosis: <principal problem not specified>  Active Problems:    Lung cancer ([facility] (9/29/2021)      Severe protein-calorie malnutrition ([facility] (10/1/2021)        Hospital Course:  Mr. Sanaz Powers is a relatively new patient to Dr. Mell Mueller. He was seen earlier this month after an assessment for chest pain and shortness of breath. Scanning showed a pleural effusion on the right ahd a right hilar mass. PET scanning showed diffuse hypermetabolic nodes, a large mediastinal mass, and bone metastases. There was mild hypercalcemia. Biopsy of a left axillary node was positive for SCLC. He was seen in clinic on day of admission in anticipation of starting therapy with atezolizumab, etoposide and carboplatin. His performance status was noted to be limited and he lives alone without accessible assistance. It was felt to be dangerous to consider therapy under those conditions and a decision was made to admit him. The decision was made to proceed with etoposide and [facility] inpatient. Kash Patience is not available in the hospital. His performance status has dramatically improved. PT agrees to [facility] PT/OT and patient is in agreement. He had PICC line placed here which will be removed. IR consulted for port as OP. He has tolerated treatment well. He has been advised to call with any fevers or other concerning symptoms. We will arrange for follow up with NP one week and with Dr. Mell uMeller 10/21 prior to his next cycle chemo. Also requested infusion on 10/21-10/23. OP MRI brain scheduled 10/4 at 1915.     Consults:  None    Pertinent Diagnostic Studies:   Labs:    Recent Labs     10/02/21  0238 10/01/21  0333 09/30/21  0532   WBC 10.0 6.4 7.6   HGB 8.1* 8. 6* 8.9*    362 367   ANEU 8.6* 5.6 4.3      Recent Labs     10/02/21  0238 10/01/21  0333 09/30/21  0532    133* 135*   K 4.4 4.1 3.2*    104 102   CO2 25 22 27   * 113* 79   BUN 9 7* 10   CREA 0.44* 0.34* 0.56*   CA 9.2 8.9 9.1   AP 50 56 65   TP 7.1 7.4 7.6   ALB 2.0* 2.1* 2.1*   MG 1.7* 1.6* 1.7*     Current Discharge Medication List      START taking these medications    Details   magnesium oxide (MAG-OX) 400 mg tablet Take 1 Tablet by mouth four (4) times daily. Qty: 120 Tablet, Refills: 0  Start date: 10/2/2021         CONTINUE these medications which have CHANGED    Details   amLODIPine (NORVASC) 5 mg tablet Take 1 Tablet by mouth daily. Qty: 90 Tablet, Refills: 0  Start date: 10/2/2021         CONTINUE these medications which have NOT CHANGED    Details   albuterol (PROVENTIL HFA, VENTOLIN HFA, PROAIR HFA) 90 mcg/actuation inhaler       Advair Diskus 250-50 mcg/dose diskus inhaler Start date: 9/29/2021      fluticasone propionate (FLONASE) 50 mcg/actuation nasal spray       morphine CR (MS CONTIN) 15 mg CR tablet Take 1 Tablet by mouth every twelve (12) hours for 30 days. Max Daily Amount: 30 mg.  Qty: 60 Tablet, Refills: 0  Start date: 9/29/2021, End date: 10/29/2021    Associated Diagnoses: Cancer related pain; Encounter for palliative care      HYDROcodone-acetaminophen (Norco)  mg tablet Take 1 Tablet by mouth every four (4) hours for 30 days. Max Daily Amount: 6 Tablets. Qty: 180 Tablet, Refills: 0  Start date: 9/29/2021, End date: 10/29/2021    Associated Diagnoses: Cancer related pain; Encounter for palliative care      mirtazapine (REMERON) 15 mg tablet Take 1 Tablet by mouth nightly. Qty: 30 Tablet, Refills: 2      pantoprazole (PROTONIX) 40 mg tablet Take 40 mg by mouth daily. aspirin delayed-release 81 mg tablet Take 81 mg by mouth daily. EC      furosemide (LASIX) 20 mg tablet Take 20 mg by mouth daily.       potassium chloride SR (Klor-Con 10) 10 mEq tablet Take 10 mEq by mouth daily. ondansetron hcl (ZOFRAN) 4 mg tablet Take 4 mg by mouth every eight (8) hours as needed for Nausea or Vomiting.      gabapentin (NEURONTIN) 300 mg capsule Take 300 mg by mouth three (3) times daily. moxifloxacin (VIGAMOX) 0.5 % ophthalmic solution Administer 1 Drop to right eye four (4) times daily. Right eye. Patient has prescription from Dr Linda Velez- to be filled. prochlorperazine (Compazine) 10 mg tablet Take 1 Tablet by mouth every six (6) hours as needed for Nausea. Indications: nausea and vomiting caused by cancer drugs, nausea and vomiting  Qty: 90 Tablet, Refills: 3    Associated Diagnoses: CINV (chemotherapy-induced nausea and vomiting)      lidocaine-prilocaine (EMLA) topical cream Apply  to affected area as needed for Pain. Qty: 30 g, Refills: 0    Associated Diagnoses: Port-A-Cath in place         STOP taking these medications       doxycycline (MONODOX) 100 mg capsule Comments:   Reason for Stopping:               OBJECTIVE:  Patient Vitals for the past 8 hrs:   BP Temp Pulse Resp SpO2   10/02/21 0735 118/80 98.1 °F (36.7 °C) 90 18 100 %   10/02/21 0230 125/85 97.5 °F (36.4 °C) 89 18 100 %     Temp (24hrs), Av.8 °F (36.6 °C), Min:97.4 °F (36.3 °C), Max:98.3 °F (36.8 °C)    10/02 0701 - 10/02 1900  In: -   Out: 200 [Urine:200]    Physical Exam:  Constitutional: Well developed male in no acute distress, sitting comfortably on bed   HEENT: Normocephalic and atraumatic. Oropharynx is clear, mucous membranes are moist.  Pupils are equal, round, and reactive to light. Extraocular muscles are intact. Sclerae anicteric   Skin Warm and dry. No bruising and no rash noted. No erythema. No pallor. Respiratory Lungs are clear to auscultation bilaterally without wheezes, rales or rhonchi, normal air exchange without accessory muscle use. CVS Normal rate, regular rhythm and normal S1 and S2. No murmurs, gallops, or rubs.    Abdomen Soft, nontender and nondistended, normoactive bowel sounds. No palpable mass. No hepatosplenomegaly. Neuro Grossly nonfocal with no obvious sensory or motor deficits. MSK Normal range of motion in general.  No edema and no tenderness. Psych Appropriate mood and affect. ASSESSMENT:    Active Problems:    Lung cancer ([facility] (9/29/2021)      Severe protein-calorie malnutrition ([facility] (10/1/2021)        DISPOSITION:  Follow-up Appointments   Procedures    FOLLOW UP VISIT Appointment in: One Week Please arrange follow up with NP one week labs prior. Please arrange follow up with Dr. Nahomi Lowery 10/21. Please arrange infusion appointment after [facility] visit for 10/21, 10/22, 10/23 for chemo     Please arrange follow up with NP one week labs prior. Please arrange follow up with Dr. Nahomi Lowery 10/21. Please arrange infusion appointment after [facility] visit for 10/21, 10/22, 10/23 for chemo     Standing Status:   Standing     Number of Occurrences:   1     Order Specific Question:   Appointment in     Answer: One Week         Over 30  minutes was spent in discharge planning and coordination of care.             Carmen Arrieta NP  [facility] Hematology & Oncology  [address] [address]  Office : [phone]  Fax : [phone]

## 2021-10-01 NOTE — PROGRESS NOTES
[facility] Hematology & Oncology        Inpatient Hematology / Oncology Progress Note      Admission Date: 2021  5:10 PM  Reason for Admission/Hospital Course: Lung cancer ([address] [C34.90]      24 Hour Events:  D2 C1 carbo/etop  Completes tomorrow  Feeling much better today  Ambulating alone  Much more energetic    ROS:  Constitutional: negative for fever, chills. + weakness, malaise, fatigue improved. CV: negative for chest pain, palpitations, edema. Respiratory: negative for dyspnea, cough, wheezing. GI: negative for abdominal pain, diarrhea. 10 point review of systems is otherwise negative with the exception of the elements mentioned above in the HPI. No Known Allergies    OBJECTIVE:  Patient Vitals for the past 8 hrs:   BP Temp Pulse Resp SpO2   10/01/21 1132 118/80 97.7 °F (36.5 °C) (!) 110 17 97 %   10/01/21 0809 126/73 97.7 °F (36.5 °C) 99 18 100 %     Temp (24hrs), Av.9 °F (36.6 °C), Min:97.3 °F (36.3 °C), Max:98.8 °F (37.1 °C)    10/01 0701 - 10/01 1900  In: 523 [P.O.:358; I.V.:239]  Out: -     Physical Exam:  Constitutional: Well developed  appearing  male in no acute distress, sitting comfortably in the hospital bed. HEENT: Normocephalic and atraumatic. Oropharynx is clear, mucous membranes are moist.  Pupils are equal, round, and reactive to light. Extraocular muscles are intact. Sclerae anicteric. Skin Warm and dry. No bruising and no rash noted. No erythema. No pallor. Respiratory Lungs are clear to auscultation bilaterally without wheezes, rales or rhonchi, normal air exchange without accessory muscle use. CVS Normal rate, regular rhythm and normal S1 and S2. No murmurs, gallops, or rubs. Abdomen Soft, nontender and nondistended, normoactive bowel sounds. Neuro Grossly nonfocal with no obvious sensory or motor deficits. MSK Normal range of motion in general.  No edema and no tenderness. Psych Appropriate mood and affect.         Labs:      Recent Labs 10/01/21  0333 09/30/21  0532 09/29/21  0808   WBC 6.4 7.6 11.7*   RBC 3.51* 3.63* 4.24   HGB 8.6* 8.9* 10.3*   HCT 28.0* 28.4* 32.5   MCV 79.8 78.2* 76.7*   MCH 24.5* 24.5* 24.3*   MCHC 30.7* 31.3* 31.7   RDW 16.3* 16.2* 16.2*    367 480*   GRANS 88* 57 69   LYMPH 6* 16 11*   MONOS 5 24* 19*   EOS 0* 2 0*   BASOS 0 1 0   IG 1 1 1   DF AUTOMATED AUTOMATED AUTOMATED   ANEU 5.6 4.3 8.0   ABL 0.4* 1.2 1.3   ABM 0.3 1.8* 2.2*   SAVANA 0.0 0.1 0.1   ABB 0.0 0.1 0.1   AIG 0.1 0.1 0.1        Recent Labs     10/01/21  0333 09/30/21  0532 09/29/21  0808   * 135* 133*   K 4.1 3.2* 3.3*    102 98   CO2 22 27 25   AGAP 7 6* 10   * 79 93   BUN 7* 10 12   CREA 0.34* 0.56* 0.62*   GFRAA >60 >60 >60   GFRNA >60 >60 >60   CA 8.9 9.1 10.0   AP 56 65 78   TP 7.4 7.6 8.9*   ALB 2.1* 2.1* 2.6*   GLOB 5.3* 5.5* 6.3*   AGRAT 0.4* 0.4* 0.4*   MG 1.6* 1.7* 1.6*         Imaging:    Medications:  Current Facility-Administered Medications   Medication Dose Route Frequency    magnesium oxide (MAG-OX) tablet 400 mg  400 mg Oral TID    0.9% sodium chloride infusion  25 mL/hr IntraVENous CONTINUOUS    dexamethasone (DECADRON) 4 mg/mL injection 8 mg  8 mg IntraVENous ONCE    etoposide (VEPESID) 191 mg in 0.9% sodium chloride 500 mL chemo infusion  100 mg/m2 (Treatment Plan Recorded) IntraVENous ONCE    sodium chloride (NS) flush 10 mL  10 mL IntraVENous PRN    0.9% sodium chloride injection 10 mL  10 mL IntraVENous PRN    heparin (porcine) pf 300-500 Units  300-500 Units InterCATHeter PRN    potassium chloride (K-DUR, KLOR-CON) SR tablet 20 mEq  20 mEq Oral BID    sodium chloride (NS) flush 20 mL  20 mL InterCATHeter DAILY    sodium chloride (NS) flush 20 mL  20 mL InterCATHeter PRN    aspirin delayed-release tablet 81 mg  81 mg Oral DAILY    furosemide (LASIX) tablet 20 mg  20 mg Oral DAILY    gabapentin (NEURONTIN) capsule 300 mg  300 mg Oral TID    HYDROcodone-acetaminophen (NORCO)  mg tablet 1 Tablet 1 Tablet Oral Q4H    mirtazapine (REMERON) tablet 15 mg  15 mg Oral QHS    morphine CR (MS CONTIN) tablet 15 mg  15 mg Oral Q12H    pantoprazole (PROTONIX) tablet 40 mg  40 mg Oral DAILY    prochlorperazine (COMPAZINE) with saline injection 5 mg  5 mg IntraVENous Q6H PRN    ondansetron (ZOFRAN) injection 4 mg  4 mg IntraVENous Q4H PRN    0.9% sodium chloride infusion  75 mL/hr IntraVENous CONTINUOUS    enoxaparin (LOVENOX) injection 40 mg  40 mg SubCUTAneous Q24H    amLODIPine (NORVASC) tablet 5 mg  5 mg Oral DAILY         ASSESSMENT:    Problem List  Date Reviewed: 9/29/2021        Codes Class Noted    Severe protein-calorie malnutrition ([facility] (Chronic) ICD-10-CM: W49  ICD-9-CM: 262  10/1/2021        Listed for admission to hospital ICD-10-CM: Z75.1  ICD-9-CM: V63.2  9/29/2021        Lung cancer ([facility] ICD-10-CM: C34.90  ICD-9-CM: 162.9  9/29/2021        Dehydration ICD-10-CM: E86.0  ICD-9-CM: 276.51  9/23/2021        Nausea & vomiting ICD-10-CM: R11.2  ICD-9-CM: 787.01  9/23/2021        Nausea ICD-10-CM: R11.0  ICD-9-CM: 787.02  9/23/2021        Hypokalemia ICD-10-CM: E87.6  ICD-9-CM: 276.8  9/23/2021        Small cell lung carcinoma, right (HCC) ICD-10-CM: C34.91  ICD-9-CM: 162.9  9/23/2021        Pleural effusion on right ICD-10-CM: J90  ICD-9-CM: 511.9  8/16/2021        Mediastinal mass ICD-10-CM: J98.59  ICD-9-CM: 786.6  8/16/2021        Shortness of breath ICD-10-CM: R06.02  ICD-9-CM: 786.05  8/16/2021        Axillary adenopathy ICD-10-CM: R59.0  ICD-9-CM: 785.6  8/16/2021        Adrenal nodule ([address] ICD-10-CM: E27.8  ICD-9-CM: 255.8  8/16/2021            Mr. Brigitte Gonzales is a relatively new patient to Dr. Kristine Schroeder. He was seen earlier this month after an assessment for chest pain and shortness of breath. Scanning showed a pleural effusion on the right ahd a right hilar mass. PET scanning showed diffuse hypermetabolic nodes, a large mediastinal mass, and bone metastases. There was mild hypercalcemia.  Biopsy of a left axillary node was positive for SCLC. He was seen today in anticipation of starting therapy with atezolizumab, etoposide and carboplatin. His performance status was noted to be limited and he lives alone without accessible assistance. It was felt to be dangerous to consider therapy under those conditions and a decision was made to admit him. We cannot provide the atezolizumab in the hospital but will proceed with etoposide and Tana. 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 He may need consideration of placement. PLAN:  Small cell carcinoma  - start C1 of Carbo/Etop/Tecentriq while inpatient tomorrow if nausea and pain is improved. 9/30 D1C1 Carbo/Etop  10/1 completes tomorrow     Intractable pain  -Seen by palliative care. Start Ms Contin and continue Norco  9/30 Pain better controlled     Nausea  -Antiemetics      Lilly SOPs  Continue home medications     10/1 Today patient is much more energetic, ambulating alone. Pain is controlled. If he continues to do well, he could potentially go home with help from family and friends. PT zackal pending. Goals and plan of care reviewed with the patient. All questions answered to the best of our ability.             Gosia Fernandes NP   [facility] Hematology & Oncology  [address] [facility] [address]  Office : [phone]  Fax : [phone]

## 2021-10-01 NOTE — PROGRESS NOTES
END OF SHIFT    Tolerating chemo well.  Will go home with home health after completion of chemo tomorrow  Report given to oncoming nurse

## 2021-10-02 NOTE — PROGRESS NOTES
RN stated that pt's DC was held due to pt having a reaction to his Chemo meds. RN advised to hold therapy today & can check on pt tomorrow if needed.   Aric Molina, PT, LENNY

## 2021-10-02 NOTE — PROGRESS NOTES
END OF SHIFT NOTE:    Intake/Output  10/01 1901 - 10/02 0700  In: 2410 [P.O.:480; I.V.:1930]  Out: 5058 [Urine:1650]   Voiding: YES  Catheter: NO      Stool:  0 occurrences. Stool Assessment  Stool Color: Brown (09/30/21 2300)  Stool Appearance: Loose (09/30/21 2300)  Stool Amount: Small (09/30/21 2300)  Stool Source/Status: Rectum (09/30/21 2300)    Emesis:  0 occurrences. VITAL SIGNS  Patient Vitals for the past 12 hrs:   Temp Pulse Resp BP SpO2   10/02/21 0230 97.5 °F (36.4 °C) 89 18 125/85 100 %   10/01/21 2300 98.3 °F (36.8 °C) 85 17 100/67 100 %   10/01/21 1950 97.7 °F (36.5 °C) 92 18 129/89 100 %       Pain Assessment  Pain 1  Pain Scale 1: Numeric (0 - 10) (10/01/21 2000)  Pain Intensity 1: 3 (10/01/21 2000)  Patient Stated Pain Goal: 0 (10/01/21 2000)  Pain Reassessment 1: Patient resting w/respiratory rate greater than 10 (10/01/21 0234)  Pain Onset 1: ongoing (09/29/21 1719)  Pain Location 1: Chest (09/29/21 1719)  Pain Orientation 1: Left (09/29/21 1719)  Pain Description 1: Aching (09/29/21 1719)  Pain Intervention(s) 1: Medication (see MAR) (09/30/21 0507)    Ambulating  Yes - with assistance    Additional Information:   - pt resting, no needs voiced at this time    Shift report given to oncoming nurse at the bedside.     Naren Philip, RN

## 2021-10-02 NOTE — PROGRESS NOTES
END OF SHIFT NOTE:    Intake/Output  No intake/output data recorded. Voiding: YES  Catheter: NO  Drain:              Stool:  0 occurrences. Stool Assessment  Stool Color: Brown (09/30/21 2300)  Stool Appearance: Loose (09/30/21 2300)  Stool Amount: Small (09/30/21 2300)  Stool Source/Status: Rectum (09/30/21 2300)    Emesis:  0 occurrences. VITAL SIGNS  Patient Vitals for the past 12 hrs:   Temp Pulse Resp BP SpO2   10/02/21 1706  90   100 %   10/02/21 1559  (!) 119  138/88    10/02/21 1540 97.6 °F (36.4 °C) 99 20 (!) 143/91 100 %   10/02/21 1502 97.6 °F (36.4 °C) 100 22 (!) 143/93 100 %   10/02/21 1130 97.4 °F (36.3 °C) 97 18 118/82 100 %   10/02/21 0735 98.1 °F (36.7 °C) 90 18 118/80 100 %       Pain Assessment  Pain 1  Pain Scale 1: Numeric (0 - 10) (10/02/21 0750)  Pain Intensity 1: 3 (10/02/21 0750)  Patient Stated Pain Goal: 0 (10/02/21 0750)  Pain Reassessment 1: Patient resting w/respiratory rate greater than 10 (10/02/21 0750)  Pain Onset 1: ongoing (09/29/21 1719)  Pain Location 1: Chest (09/29/21 1719)  Pain Orientation 1: Left (09/29/21 1719)  Pain Description 1: Aching (09/29/21 1719)  Pain Intervention(s) 1: Medication (see MAR) (10/02/21 0750)    Ambulating  Yes    Additional Information: chemo today patient HR and BP elevated Call NP will stay another night     Shift report given to oncoming nurse at the bedside.     Donovan Sandoval RN

## 2021-10-02 NOTE — PROGRESS NOTES
Patient is anticipated to discharge today. Plan for discharge is as follows:    Care Management Interventions  PCP Verified by CM: Yes  Last Visit to PCP: 09/27/21  Support Systems: No Support Systems (Lives alone)  Confirm Follow Up Transport: Self  The Plan for Transition of Care is Related to the Following Treatment Goals : Return Home  Discharge Location  Discharge Placement: Home with home health ([facility])    Milestones and interventions have been entered.      Nina Mcnair LMSW    [facility]    * [email or web address]

## 2021-10-02 NOTE — PROGRESS NOTES
Patient advises that he has no way of getting home. [employer] arranged.      Ange Zendejas LMSW     [facility]    * [email or web address]

## 2021-10-02 NOTE — PROGRESS NOTES
Called Twila NATION NP concerning patient's elevated BP and HR. He stated that he just would feel better to stay he does not feel the best after his chemo but better after the solucortef. He was rushing to get dressed and I told him to rest and his HR elevated. Morteza De La Cruz agreed to let him stay another night and discharge tomorrow.

## 2021-10-02 NOTE — DISCHARGE INSTRUCTIONS
DISCHARGE SUMMARY from Nurse    PATIENT INSTRUCTIONS:    After general anesthesia or intravenous sedation, for 24 hours or while taking prescription Narcotics:  · Limit your activities  · Do not drive and operate hazardous machinery  · Do not make important personal or business decisions  · Do  not drink alcoholic beverages  · If you have not urinated within 8 hours after discharge, please contact your surgeon on call. Report the following to your surgeon:  · Excessive pain, swelling, redness or odor of or around the surgical area  · Temperature over 100.5  · Nausea and vomiting lasting longer than 4 hours or if unable to take medications  · Any signs of decreased circulation or nerve impairment to extremity: change in color, persistent  numbness, tingling, coldness or increase pain  · Any questions    What to do at Home:  Recommended activity: Activity as tolerated,     If you experience any of the following symptoms fever above 100.5, please follow up with doctor or go to the ER . *  Please give a list of your current medications to your Primary Care Provider. *  Please update this list whenever your medications are discontinued, doses are      changed, or new medications (including over-the-counter products) are added. *  Please carry medication information at all times in case of emergency situations. These are general instructions for a healthy lifestyle:    No smoking/ No tobacco products/ Avoid exposure to second hand smoke  Surgeon General's Warning:  Quitting smoking now greatly reduces serious risk to your health.     Obesity, smoking, and sedentary lifestyle greatly increases your risk for illness    A healthy diet, regular physical exercise & weight monitoring are important for maintaining a healthy lifestyle    You may be retaining fluid if you have a history of heart failure or if you experience any of the following symptoms:  Weight gain of 3 pounds or more overnight or 5 pounds in a week, increased swelling in our hands or feet or shortness of breath while lying flat in bed. Please call your doctor as soon as you notice any of these symptoms; do not wait until your next office visit. The discharge information has been reviewed with the patient. The patient verbalized understanding. Discharge medications reviewed with the patient and appropriate educational materials and side effects teaching were provided.   ___________________________________________________________________________________________________________________________________

## 2021-10-02 NOTE — PROGRESS NOTES
Etoposide finished and he stated that he was SOB. Vital signs stable. Gave Solu-cortef. Called Masha Streeter, NP okay will patient still going home today. He stated that he feels much better now.

## 2021-10-02 NOTE — PROGRESS NOTES
Problem: Falls - Risk of  Goal: *Absence of Falls  Description: Document Mya Franks Fall Risk and appropriate interventions in the flowsheet.   Outcome: Progressing Towards Goal  Note: Fall Risk Interventions:  Mobility Interventions: Patient to call before getting OOB, Communicate number of staff needed for ambulation/transfer         Medication Interventions: Teach patient to arise slowly, Patient to call before getting OOB    Elimination Interventions: Call light in reach, Patient to call for help with toileting needs

## 2021-10-03 NOTE — PROGRESS NOTES
END OF SHIFT NOTE:    Intake/Output  10/02 1901 - 10/03 0700  In: -   Out: 1050 [Urine:1050]   Voiding: YES  Catheter: NO      Stool:  0 occurrences. Stool Assessment  Stool Color: Brown (09/30/21 2300)  Stool Appearance: Loose (09/30/21 2300)  Stool Amount: Small (09/30/21 2300)  Stool Source/Status: Rectum (09/30/21 2300)    Emesis:  0 occurrences. VITAL SIGNS  Patient Vitals for the past 12 hrs:   Temp Pulse Resp BP SpO2   10/03/21 0258 97.9 °F (36.6 °C) (!) 102 19 (!) 145/97 97 %   10/02/21 2244 98.4 °F (36.9 °C) 87 19 127/79 97 %   10/02/21 1952 98.4 °F (36.9 °C) 98 19 130/70 97 %       Pain Assessment  Pain 1  Pain Scale 1: Numeric (0 - 10) (10/02/21 1925)  Pain Intensity 1: 2 (10/02/21 1925)  Patient Stated Pain Goal: 0 (10/02/21 1925)  Pain Reassessment 1: Patient resting w/respiratory rate greater than 10 (10/02/21 0750)  Pain Onset 1: ongoing (09/29/21 1719)  Pain Location 1: Chest (09/29/21 1719)  Pain Orientation 1: Left (09/29/21 1719)  Pain Description 1: Aching (09/29/21 1719)  Pain Intervention(s) 1: Medication (see MAR) (10/02/21 0750)    Ambulating  Yes    Additional Information:   - pt resting, no needs voiced at this time    Shift report given to oncoming nurse at the bedside.     Silvia Fajardo RN

## 2021-10-03 NOTE — PROGRESS NOTES
Problem: Falls - Risk of  Goal: *Absence of Falls  Description: Document Nav Dolin Fall Risk and appropriate interventions in the flowsheet.   Outcome: Progressing Towards Goal  Note: Fall Risk Interventions:  Mobility Interventions: Patient to call before getting OOB         Medication Interventions: Teach patient to arise slowly    Elimination Interventions: Call light in reach, Patient to call for help with toileting needs

## 2021-10-03 NOTE — PROGRESS NOTES
[facility]ematology & Oncology        Inpatient Hematology / Oncology Progress Note      Admission Date: 2021  5:10 PM  Reason for Admission/Hospital Course: Lung cancer ([address] [C34.90]      24 Hour Events:  Completes cycle one carbo/etop later today  Felt unwell after completion-BP and HR elevated  Better after solucortef  Asked to stay another night    ROS:  Constitutional: negative for fever, chills. + weakness, malaise, fatigue improved. CV: negative for chest pain, palpitations, edema. Respiratory: negative for dyspnea, cough, wheezing. GI: negative for abdominal pain, diarrhea. 10 point review of systems is otherwise negative with the exception of the elements mentioned above in the HPI. No Known Allergies    OBJECTIVE:  Patient Vitals for the past 8 hrs:   BP Temp Pulse Resp SpO2   10/03/21 0258 (!) 145/97 97.9 °F (36.6 °C) (!) 102 19 97 %     Temp (24hrs), Av.9 °F (36.6 °C), Min:97.4 °F (36.3 °C), Max:98.4 °F (36.9 °C)    No intake/output data recorded. Physical Exam:  Constitutional: Well developed  appearing  male in no acute distress, sitting comfortably in the hospital bed. HEENT: Normocephalic and atraumatic. Oropharynx is clear, mucous membranes are moist.  Pupils are equal, round, and reactive to light. Extraocular muscles are intact. Sclerae anicteric. Skin Warm and dry. No bruising and no rash noted. No erythema. No pallor. Respiratory Lungs are clear to auscultation bilaterally without wheezes, rales or rhonchi, normal air exchange without accessory muscle use. CVS Normal rate, regular rhythm and normal S1 and S2. No murmurs, gallops, or rubs. Abdomen Soft, nontender and nondistended, normoactive bowel sounds. Neuro Grossly nonfocal with no obvious sensory or motor deficits. MSK Normal range of motion in general.  No edema and no tenderness. Psych Appropriate mood and affect.         Labs:      Recent Labs     10/03/21  0240 10/02/21  7673 10/01/21  0333   WBC 10.0 10.0 6.4   RBC 3.52* 3.35* 3.51*   HGB 8.6* 8.1* 8.6*   HCT 27.7* 26.5* 28.0*   MCV 78.7* 79.1* 79.8   MCH 24.4* 24.2* 24.5*   MCHC 31.0* 30.6* 30.7*   RDW 16.4* 16.3* 16.3*    378 362   GRANS 94* 86* 88*   LYMPH 4* 5* 6*   MONOS 2* 8 5   EOS 0* 0* 0*   BASOS 0 0 0   IG 1 1 1   DF AUTOMATED AUTOMATED AUTOMATED   ANEU 9.4* 8.6* 5.6   ABL 0.4* 0.5 0.4*   ABM 0.2 0.8 0.3   SAVANA 0.0 0.0 0.0   ABB 0.0 0.0 0.0   AIG 0.1 0.1 0.1        Recent Labs     10/03/21  0240 10/02/21  0238 10/01/21  0333   * 137 133*   K 4.5 4.4 4.1    107 104   CO2 27 25 22   AGAP 5* 5* 7   GLU 92 117* 113*   BUN 12 9 7*   CREA 0.42* 0.44* 0.34*   GFRAA >60 >60 >60   GFRNA >60 >60 >60   CA 9.3 9.2 8.9   AP 55 50 56   TP 7.5 7.1 7.4   ALB 2.2* 2.0* 2.1*   GLOB 5.3* 5.1* 5.3*   AGRAT 0.4* 0.4* 0.4*   MG 1.8 1.7* 1.6*         Imaging:    Medications:  Current Facility-Administered Medications   Medication Dose Route Frequency    polyethylene glycol (MIRALAX) packet 17 g  17 g Oral DAILY PRN    magnesium oxide (MAG-OX) tablet 400 mg  400 mg Oral TID    potassium chloride (K-DUR, KLOR-CON) SR tablet 20 mEq  20 mEq Oral BID    sodium chloride (NS) flush 20 mL  20 mL InterCATHeter DAILY    sodium chloride (NS) flush 20 mL  20 mL InterCATHeter PRN    aspirin delayed-release tablet 81 mg  81 mg Oral DAILY    furosemide (LASIX) tablet 20 mg  20 mg Oral DAILY    gabapentin (NEURONTIN) capsule 300 mg  300 mg Oral TID    HYDROcodone-acetaminophen (NORCO)  mg tablet 1 Tablet  1 Tablet Oral Q4H    mirtazapine (REMERON) tablet 15 mg  15 mg Oral QHS    morphine CR (MS CONTIN) tablet 15 mg  15 mg Oral Q12H    pantoprazole (PROTONIX) tablet 40 mg  40 mg Oral DAILY    prochlorperazine (COMPAZINE) with saline injection 5 mg  5 mg IntraVENous Q6H PRN    ondansetron (ZOFRAN) injection 4 mg  4 mg IntraVENous Q4H PRN    0.9% sodium chloride infusion  75 mL/hr IntraVENous CONTINUOUS    enoxaparin (LOVENOX) injection 40 mg  40 mg SubCUTAneous Q24H    amLODIPine (NORVASC) tablet 5 mg  5 mg Oral DAILY         ASSESSMENT:    Problem List  Date Reviewed: 9/29/2021        Codes Class Noted    Severe protein-calorie malnutrition ([facility] (Chronic) ICD-10-CM: E43  ICD-9-CM: 262  10/1/2021        Listed for admission to hospital ICD-10-CM: Z75.1  ICD-9-CM: V63.2  9/29/2021        Lung cancer ([facility] ICD-10-CM: C34.90  ICD-9-CM: 162.9  9/29/2021        Dehydration ICD-10-CM: E86.0  ICD-9-CM: 276.51  9/23/2021        Nausea & vomiting ICD-10-CM: R11.2  ICD-9-CM: 787.01  9/23/2021        Nausea ICD-10-CM: R11.0  ICD-9-CM: 787.02  9/23/2021        Hypokalemia ICD-10-CM: E87.6  ICD-9-CM: 276.8  9/23/2021        Small cell lung carcinoma, right (HCC) ICD-10-CM: C34.91  ICD-9-CM: 162.9  9/23/2021        Pleural effusion on right ICD-10-CM: J90  ICD-9-CM: 511.9  8/16/2021        Mediastinal mass ICD-10-CM: J98.59  ICD-9-CM: 786.6  8/16/2021        Shortness of breath ICD-10-CM: R06.02  ICD-9-CM: 786.05  8/16/2021        Axillary adenopathy ICD-10-CM: R59.0  ICD-9-CM: 785.6  8/16/2021        Adrenal nodule ([facility] ICD-10-CM: E27.8  ICD-9-CM: 255.8  8/16/2021            Mr. Melchor Potter is a relatively new patient to Dr. Mercedes Lang. He was seen earlier this month after an assessment for chest pain and shortness of breath. Scanning showed a pleural effusion on the right ahd a right hilar mass. PET scanning showed diffuse hypermetabolic nodes, a large mediastinal mass, and bone metastases. There was mild hypercalcemia. Biopsy of a left axillary node was positive for SCLC. He was seen today in anticipation of starting therapy with atezolizumab, etoposide and carboplatin. His performance status was noted to be limited and he lives alone without accessible assistance. It was felt to be dangerous to consider therapy under those conditions and a decision was made to admit him.  We cannot provide the atezolizumab in the hospital but will proceed with etoposide and Tana. 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 He may need consideration of placement. PLAN:  Small cell carcinoma  - start C1 of Carbo/Etop/Tecentriq while inpatient tomorrow if nausea and pain is improved. 9/30 D1C1 Carbo/Etop  10/1 completes tomorrow     Intractable pain  -Seen by palliative care. Start Ms Contin and continue Norco  9/30 Pain better controlled     Nausea  -Antiemetics      Lilly SOPs  Continue home medications     Goals and plan of care reviewed with the patient. All questions answered to the best of our ability.             Yana Pino NP   [facility]  [id number] [place] [address]  Office : [phone]  Fax : [phone]

## 2021-10-06 PROBLEM — C34.91 SMALL CELL LUNG CARCINOMA, RIGHT (HCC): Chronic | Status: ACTIVE | Noted: 2021-01-01

## 2021-10-06 PROBLEM — J90 RECURRENT RIGHT PLEURAL EFFUSION: Status: ACTIVE | Noted: 2021-01-01

## 2021-10-06 NOTE — ED TRIAGE NOTES
Patient states he is short of breath. Breathing 32/min. States he started feeling bad on Monday with N/V/D and short of breath. His breathing is worse today. Had Chemo on Sat. Pt has history of lung cancer. Had a thoracentesis 1.5 months ago and derian off [place] lot\". Mask on during triage.

## 2021-10-06 NOTE — ED PROVIDER NOTES
Patient was discharged 10/30/2021 after receiving chemotherapy for lung cancer. Note follows. Hospital Course:  Mr. Melchor Potter is a relatively new patient to Dr. Mercedes Lang. He was seen earlier this month after an assessment for chest pain and shortness of breath. Scanning showed a pleural effusion on the right ahd a right hilar mass. PET scanning showed diffuse hypermetabolic nodes, a large mediastinal mass, and bone metastases. There was mild hypercalcemia. Biopsy of a left axillary node was positive for SCLC. He was seen in clinic on day of admission in anticipation of starting therapy with atezolizumab, etoposide and carboplatin. His performance status was noted to be limited and he lives alone without accessible assistance. It was felt to be dangerous to consider therapy under those conditions and a decision was made to admit him. The decision was made to proceed with etoposide and [facility] inpatient. Bacilio Temple is not available in the hospital. His performance status has dramatically improved. PT agrees to [facility] PT/OT and patient is in agreement. He had PICC line placed here which will be removed. IR consulted for port as OP. He has tolerated treatment well. He has been advised to call with any fevers or other concerning symptoms. We will arrange for follow up with NP one week and with Dr. Mercedes Lang 10/21 prior to his next cycle chemo. Also requested infusion on 10/21-10/23. Patient started having some chest pain the next day mild across the chest.  This has continued with some shortness of breath and seems to be getting worse so came in today. Has some nausea vomiting and diarrhea with it. Has had previous pleural effusion and thoracentesis a little over a month ago. The history is provided by the patient. No  was used. Shortness of Breath  This is a new problem. The problem occurs continuously. The current episode started more than 2 days ago. The problem has been gradually worsening. Associated symptoms include cough, chest pain and vomiting. Pertinent negatives include no fever, no headaches, no rhinorrhea, no sore throat, no neck pain, no sputum production, no wheezing, no orthopnea, no abdominal pain, no rash, no leg pain and no leg swelling. He has tried nothing for the symptoms.         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, blind in left eye,  deaf in left ear,\"every bone in skull was broken\"       Past Surgical History:   Procedure Laterality Date    HX APPENDECTOMY      HX HEENT  9/2009    facial reconstruction post trauma         Family History:   Problem Relation Age of Onset    Diabetes Maternal Aunt     Diabetes Paternal Aunt        Social History     Socioeconomic History    Marital status:      Spouse name: Not on file    Number of children: Not on file    Years of education: Not on file    Highest education level: Not on file   Occupational History    Not on file   Tobacco Use    Smoking status: Current Every Day Smoker     Packs/day: 0.50     Years: 30.00     Pack years: 15.00    Smokeless tobacco: Never Used   Substance and Sexual Activity    Alcohol use: Not Currently     Alcohol/week: 0.8 standard drinks     Types: 1 Cans of beer per week    Drug use: No    Sexual activity: Not on file   Other Topics Concern     Service Not Asked    Blood Transfusions Not Asked    Caffeine Concern Not Asked    Occupational Exposure Not Asked    Hobby Hazards Not Asked    Sleep Concern Not Asked    Stress Concern Not Asked    Weight Concern Not Asked    Special Diet Not Asked    Back Care Not Asked    Exercise Not Asked    Bike Helmet Not Asked    Seat Belt Not Asked    Self-Exams Not Asked   Social History Narrative    Not on file     Social Determinants of Health     Financial Resource Strain:     Difficulty of Paying Living Expenses:    Food Insecurity:     Worried About Running Out of Food in the Last Year:     [address] in the Last Year:    Transportation Needs:     Lack of Transportation (Medical):  Lack of Transportation (Non-Medical):    Physical Activity:     Days of Exercise per Week:     Minutes of Exercise per Session:    Stress:     Feeling of Stress :    Social Connections:     Frequency of Communication with Friends and Family:     Frequency of Social Gatherings with Friends and Family:     Attends Spirit[employer] Services:     Active Member of Clubs or Organizations:     Attends Club or Organization Meetings:     Marital Status:    Intimate Partner Violence:     Fear of Current or Ex-Partner:     Emotionally Abused:     Physically Abused:     Sexually Abused: ALLERGIES: Patient has no known allergies. Review of Systems   Constitutional: Negative for chills and fever. HENT: Positive for congestion. Negative for rhinorrhea and sore throat. Eyes: Negative for pain and redness. Respiratory: Positive for cough and shortness of breath. Negative for sputum production, chest tightness and wheezing. Cardiovascular: Positive for chest pain. Negative for orthopnea and leg swelling. Gastrointestinal: Positive for diarrhea, nausea and vomiting. Negative for abdominal pain. Genitourinary: Negative for dysuria and hematuria. Musculoskeletal: Negative for back pain, gait problem, neck pain and neck stiffness. Skin: Negative for color change and rash. Neurological: Negative for weakness, numbness and headaches. Vitals:    10/06/21 1428 10/06/21 1430   BP:  109/63   Pulse: (!) 112    Resp: (!) 32    Temp: 98.6 °F (37 °C)    SpO2:  100%   Weight: 75.3 kg (166 lb)    Height: 5' 11\" (1.803 m)             Physical Exam  Constitutional:       Appearance: He is well-developed.    HENT:      Head: Normocephalic and atraumatic. Cardiovascular:      Rate and Rhythm: Regular rhythm. Tachycardia present. Pulmonary:      Effort: Tachypnea and respiratory distress (Mild increased effort.) present. Comments: Decreased breath sounds on the right. Abdominal:      General: Bowel sounds are normal.      Palpations: Abdomen is soft. Tenderness: There is no abdominal tenderness. Musculoskeletal:         General: No swelling. Normal range of motion. Cervical back: Normal range of motion and neck supple. Skin:     General: Skin is warm and dry. Neurological:      General: No focal deficit present. Mental Status: He is alert and oriented to person, place, and time. MDM  Number of Diagnoses or Management Options  Diagnosis management comments: Patient with recurrent right pleural effusion large. Last drained 1 month ago. Short of breath with some chest discomfort. Will consult Dr. Jimmie Cotton with oncology and admit for further treatment. Amount and/or Complexity of Data Reviewed  Clinical lab tests: ordered and reviewed  Tests in the radiology section of CPT®: reviewed and ordered  Tests in the medicine section of CPT®: reviewed and ordered    Patient Progress  Patient progress: stable         Procedures    EKG: nonspecific ST and T waves changes, sinus tachycardia. Rate 111. XR CHEST PORT (Final result)  Result time 10/06/21 15:03:36  Final result by Farhan Barrera MD (10/06/21 15:03:36)                Impression:    1.  Stable opacification throughout the right hemithorax consistent with the   known mass and pleural effusion. 2.  Mild opacification in the left lung base could reflect atelectasis or new   infiltrates. Narrative:    EXAM: CHEST X-RAY, 1 VIEW     INDICATION: cough. COMPARISON: Chest x-ray 9/10/2021     TECHNIQUE: Single AP view of the chest was obtained.      FINDINGS: Stable large right pleural effusion and dense opacification throughout   the right lung consistent with the known mediastinal mass. Mild opacification in   the left lung base.                     Results Include:    Recent Results (from the past 24 hour(s))   TROPONIN-HIGH SENSITIVITY    Collection Time: 10/06/21  3:00 PM   Result Value Ref Range    Troponin-High Sensitivity 38.3 (H) 0 - 14 pg/mL   CBC WITH AUTOMATED DIFF    Collection Time: 10/06/21  3:00 PM   Result Value Ref Range    WBC 5.8 4.3 - 11.1 K/uL    RBC 3.90 (L) 4.23 - 5.6 M/uL    HGB 9.5 (L) 13.6 - 17.2 g/dL    HCT 29.7 (L) 41.1 - 50.3 %    MCV 76.2 (L) 79.6 - 97.8 FL    MCH 24.4 (L) 26.1 - 32.9 PG    MCHC 32.0 31.4 - 35.0 g/dL    RDW 16.9 (H) 11.9 - 14.6 %    PLATELET 889 614 - 676 K/uL    MPV 10.2 9.4 - 12.3 FL    ABSOLUTE NRBC 0.00 0.0 - 0.2 K/uL    DF AUTOMATED      NEUTROPHILS 79 (H) 43 - 78 %    LYMPHOCYTES 15 13 - 44 %    MONOCYTES 2 (L) 4.0 - 12.0 %    EOSINOPHILS 2 0.5 - 7.8 %    BASOPHILS 1 0.0 - 2.0 %    IMMATURE GRANULOCYTES 2 0.0 - 5.0 %    ABS. NEUTROPHILS 4.6 1.7 - 8.2 K/UL    ABS. LYMPHOCYTES 0.9 0.5 - 4.6 K/UL    ABS. MONOCYTES 0.1 0.1 - 1.3 K/UL    ABS. EOSINOPHILS 0.1 0.0 - 0.8 K/UL    ABS. BASOPHILS 0.0 0.0 - 0.2 K/UL    ABS. IMM.  GRANS. 0.1 0.0 - 0.5 K/UL   PROTHROMBIN TIME + INR    Collection Time: 10/06/21  3:00 PM   Result Value Ref Range    Prothrombin time 14.4 12.6 - 14.5 sec    INR 1.1     PTT    Collection Time: 10/06/21  3:00 PM   Result Value Ref Range    aPTT 36.5 (H) 24.1 - 35.1 SEC   LIPASE    Collection Time: 10/06/21  3:00 PM   Result Value Ref Range    Lipase 93 73 - 393 U/L   LACTIC ACID    Collection Time: 10/06/21  3:00 PM   Result Value Ref Range    Lactic acid 1.2 0.4 - 2.0 MMOL/L   D DIMER    Collection Time: 10/06/21  3:00 PM   Result Value Ref Range    D DIMER 19.19 (H) <0.56 ug/ml(FEU)   NT-PRO BNP    Collection Time: 10/06/21  3:00 PM   Result Value Ref Range    NT pro- (H) 5 - 125 PG/ML   COVID-19 RAPID TEST    Collection Time: 10/06/21  3:19 PM   Result Value Ref Range    Specimen source Nasopharyngeal      COVID-19 rapid test Not detected NOTD

## 2021-10-06 NOTE — ED NOTES
TRANSFER - OUT REPORT:    Verbal report given to Haylie(name) on Toy Rojas  being transferred to 366(unit) for routine progression of care       Report consisted of patients Situation, Background, Assessment and   Recommendations(SBAR). Information from the following report(s) SBAR, ED Summary and MAR was reviewed with the receiving nurse. Lines:   Peripheral IV 10/06/21 Left Antecubital (Active)   Site Assessment Clean, dry, & intact 10/06/21 1525   Phlebitis Assessment 0 10/06/21 1525   Infiltration Assessment 0 10/06/21 1525   Dressing Status Clean, dry, & intact 10/06/21 1525        Opportunity for questions and clarification was provided.       Patient transported with:   [employer]

## 2021-10-07 NOTE — PROGRESS NOTES
Pt arrived to the floor c/o of aching chest pain. Basil CAVAZOS notified. No new orders at this time. Will continue to monitor.

## 2021-10-07 NOTE — INTERVAL H&P NOTE
Update History & Physical    The Patient's History and Physical of October 7,   Thoracentesis was reviewed with the patient and I examined the patient. There was no change. The surgical site was confirmed by the patient and me. Plan:  The risk, benefits, expected outcome, and alternative to the recommended procedure have been discussed with the patient. Patient understands and wants to proceed with the procedure.     Electronically signed by Hiram Freitas MD on 10/7/2021 at 4:13 PM

## 2021-10-07 NOTE — PROGRESS NOTES
10/06/21 2000   Dual Skin Pressure Injury Assessment   Dual Skin Pressure Injury Assessment WDL   Second Care Provider (Based on [address] [place] [address]) RICH Carroll   Skin Integumentary   Skin Integumentary (WDL) X    Pressure  Injury Documentation No Pressure Injury Noted-Pressure Ulcer Prevention Initiated   Skin Condition/Temp Flaky   Skin Integrity Intact

## 2021-10-07 NOTE — CONSULTS
History and Physical Initial Visit NOTE           10/7/2021    Stefanie Jones                        Date of Admission:  10/6/2021    The patient's chart is reviewed and the patient is discussed with the staff. Subjective:     Patient is a 73 y.o.  male seen and evaluated at the request of Dr. Carloz Laws. She has medical history of SCLC on R side with recurrent R pleural effusion who presented with SOB.  Onset a few days ago, constant, progressively worsening to point where currently he cannot make it all the way up his steps.  A/w fatigue, nausea, vomiting.  No fevers, CP, abd pain, urinary symptoms. She had a thora done 8/16 1.6L removed with Dr. Kalpana Khan. States his breathing improved for a short period, but has continued to worsen since. He is on chemoXRT with some improvement in left rib lesion. Review of Systems  A comprehensive review of systems was negative except for that written in the HPI. Prior to Admission Medications   Prescriptions Last Dose Informant Patient Reported? Taking? Advair Diskus 250-50 mcg/dose diskus inhaler Unknown at Unknown time  Yes No   HYDROcodone-acetaminophen (Norco)  mg tablet 10/6/2021 at Unknown time  No Yes   Sig: Take 1 Tablet by mouth every four (4) hours for 30 days. Max Daily Amount: 6 Tablets. albuterol (PROVENTIL HFA, VENTOLIN HFA, PROAIR HFA) 90 mcg/actuation inhaler Unknown at Unknown time  Yes No   amLODIPine (NORVASC) 5 mg tablet 10/5/2021 at Unknown time  No Yes   Sig: Take 1 Tablet by mouth daily. aspirin delayed-release 81 mg tablet 10/5/2021 at Unknown time  Yes Yes   Sig: Take 81 mg by mouth daily. EC   fluticasone propionate (FLONASE) 50 mcg/actuation nasal spray 10/5/2021 at Unknown time  Yes Yes   furosemide (LASIX) 20 mg tablet 10/5/2021 at Unknown time  Yes Yes   Sig: Take 20 mg by mouth daily.    gabapentin (NEURONTIN) 300 mg capsule 10/6/2021 at Unknown time  Yes Yes   Sig: Take 300 mg by mouth three (3) times daily. lidocaine-prilocaine (EMLA) topical cream Unknown at Unknown time  No No   Sig: Apply  to affected area as needed for Pain. Patient not taking: Reported on 9/30/2021   magnesium oxide (MAG-OX) 400 mg tablet 10/5/2021 at Unknown time  No Yes   Sig: Take 1 Tablet by mouth four (4) times daily. mirtazapine (REMERON) 15 mg tablet 10/5/2021 at Unknown time  No Yes   Sig: Take 1 Tablet by mouth nightly. morphine CR (MS CONTIN) 15 mg CR tablet 10/6/2021 at Unknown time  No Yes   Sig: Take 1 Tablet by mouth every twelve (12) hours for 30 days. Max Daily Amount: 30 mg.   ondansetron hcl (ZOFRAN) 4 mg tablet 9/6/2021 at Unknown time  Yes Yes   Sig: Take 4 mg by mouth every eight (8) hours as needed for Nausea or Vomiting. pantoprazole (PROTONIX) 40 mg tablet 10/5/2021 at Unknown time  Yes Yes   Sig: Take 40 mg by mouth daily. potassium chloride SR (Klor-Con 10) 10 mEq tablet 10/5/2021 at Unknown time  Yes Yes   Sig: Take 10 mEq by mouth daily. prochlorperazine (COMPAZINE) 10 mg tablet 10/6/2021 at Unknown time  Yes Yes   Sig: Take 10 mg by mouth every six (6) hours as needed for Nausea or Vomiting.       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, blind in left eye,  deaf in left ear,\"every bone in skull was broken\"     Past Surgical History:   Procedure Laterality Date    HX APPENDECTOMY      HX HEENT  9/2009    facial reconstruction post trauma     Social History     Socioeconomic History    Marital status:      Spouse name: Not on file    Number of children: Not on file    Years of education: Not on file    Highest education level: Not on file   Occupational History    Not on file   Tobacco Use    Smoking status: Current Every Day Smoker     Packs/day: 0.50     Years: 30.00     Pack years: 15.00    Smokeless tobacco: Never Used   Substance and Sexual Activity    Alcohol use: Not Currently     Alcohol/week: 0.8 standard drinks     Types: 1 Cans of beer per week    Drug use: No    Sexual activity: Not on file   Other Topics Concern     Service Not Asked    Blood Transfusions Not Asked    Caffeine Concern Not Asked    Occupational Exposure Not Asked    Hobby Hazards Not Asked    Sleep Concern Not Asked    Stress Concern Not Asked    Weight Concern Not Asked    Special Diet Not Asked    Back Care Not Asked    Exercise Not Asked    Bike Helmet Not Asked   [address] Not Asked    Self-Exams Not Asked   Social History Narrative    Not on file     Social Determinants of Health     Financial Resource Strain:     Difficulty of Paying Living Expenses:    Food Insecurity:     Worried About Running Out of Food in the Last Year:     Ran Out of Food in the Last Year:    Transportation Needs:     Lack of Transportation (Medical):      Lack of Transportation (Non-Medical):    Physical Activity:     Days of Exercise per Week:     Minutes of Exercise per Session:    Stress:     Feeling of Stress :    Social Connections:     Frequency of Communication with Friends and Family:     Frequency of Social Gatherings with Friends and Family:     Attends Hindu Services:     Active Member of Clubs or Organizations:     Attends Club or Organization Meetings:     Marital Status:    Intimate Partner Violence:     Fear of Current or Ex-Partner:     Emotionally Abused:     Physically Abused:     Sexually Abused:      Family History   Problem Relation Age of Onset    Diabetes Maternal Aunt     Diabetes Paternal Aunt      No Known Allergies  Current Facility-Administered Medications   Medication Dose Route Frequency    budesonide-formoteroL (SYMBICORT) 160-4.5 mcg/actuation HFA inhaler 2 Puff  2 Puff Inhalation BID RT    [Held by provider] amLODIPine (NORVASC) tablet 5 mg  5 mg Oral DAILY    aspirin delayed-release tablet 81 mg  81 mg Oral DAILY    furosemide (LASIX) tablet 20 mg  20 mg Oral DAILY    gabapentin (NEURONTIN) capsule 300 mg  300 mg Oral TID    HYDROcodone-acetaminophen (NORCO)  mg tablet 1 Tablet  1 Tablet Oral Q4H PRN    magnesium oxide (MAG-OX) tablet 400 mg  400 mg Oral BID    mirtazapine (REMERON) tablet 15 mg  15 mg Oral QHS    morphine CR (MS CONTIN) tablet 15 mg  15 mg Oral Q12H    pantoprazole (PROTONIX) tablet 40 mg  40 mg Oral DAILY    potassium chloride (KLOR-CON) tablet 10 mEq  10 mEq Oral DAILY    promethazine (PHENERGAN) with saline injection 25 mg  25 mg IntraVENous Q6H PRN    sodium chloride (NS) flush 5-40 mL  5-40 mL IntraVENous Q8H    sodium chloride (NS) flush 5-40 mL  5-40 mL IntraVENous PRN    acetaminophen (TYLENOL) tablet 650 mg  650 mg Oral Q6H PRN    Or    acetaminophen (TYLENOL) suppository 650 mg  650 mg Rectal Q6H PRN    polyethylene glycol (MIRALAX) packet 17 g  17 g Oral DAILY PRN    bisacodyL (DULCOLAX) suppository 10 mg  10 mg Rectal DAILY PRN    ondansetron (ZOFRAN ODT) tablet 4 mg  4 mg Oral Q8H PRN    Or    ondansetron (ZOFRAN) injection 4 mg  4 mg IntraVENous Q6H PRN    famotidine (PEPCID) tablet 20 mg  20 mg Oral BID PRN    alum-mag hydroxide-simeth (MYLANTA) oral suspension 15 mL  15 mL Oral Q6H PRN    HYDROcodone-acetaminophen (NORCO) 5-325 mg per tablet 1 Tablet  1 Tablet Oral Q4H PRN    enoxaparin (LOVENOX) injection 40 mg  40 mg SubCUTAneous Q24H     Objective:   Blood pressure (!) 105/57, pulse (!) 103, temperature 98.8 °F (37.1 °C), resp. rate 18, height 5' 11\" (1.803 m), weight 166 lb (75.3 kg), SpO2 100 %.      Intake/Output Summary (Last 24 hours) at 10/7/2021 1522  Last data filed at 10/7/2021 1334  Gross per 24 hour   Intake 1748 ml   Output 300 ml   Net 1448 ml     PHYSICAL EXAM   Constitutional:  the patient is well developed and in no acute distress  EENMT:  Sclera clear, pupils equal, oral mucosa moist  Respiratory: diminished on right  Cardiovascular:  RRR without M,G,R  Gastrointestinal: soft and non-tender; with positive bowel sounds. Musculoskeletal: warm without cyanosis. There is no lower extremity edema. Skin:  no jaundice or rashes, no wounds   Neurologic: no gross neuro deficits     Psychiatric:  alert and oriented x 4    CXR/CT chest:  Large right effusion, no real change over the past month, RLL airway appears obstructive from mass. Recent Labs     10/07/21  0225 10/06/21  1650 10/06/21  1500   WBC 4.0*  --  5.8   HGB 9.0*  --  9.5*   HCT 28.4*  --  29.7*     --  422   INR  --   --  1.1   LAC  --   --  1.2   * 134*  --    K 3.8 4.0  --     100  --    CO2 28 26  --    GLU 82 92  --    BUN 14 15  --    CREA 0.51* 0.48*  --    CA 9.2 9.3  --    ALB  --  2.3*  --    AST  --  39*  --    ALT  --  13  --    AP  --  54  --    TROPHS  --  38.7* 38.3*   BNPNT  --   --  229*     ECHO: No results found for this or any previous visit. Results     Procedure Component Value Units Date/Time    COVID-19 RAPID TEST [[id number]] Collected: 10/06/21 1519    Order Status: Completed Specimen: Nasopharyngeal Updated: 10/06/21 1547     Specimen source Nasopharyngeal        COVID-19 rapid test Not detected        Comment:      The specimen is NEGATIVE for SARS-CoV-2, the novel coronavirus associated with COVID-19. A negative result does not rule out COVID-19. This test has been authorized by the FDA under an Emergency Use Authorization (EUA) for use by authorized laboratories.         Fact sheet for Healthcare Providers: ConventionUpdate.co.nz  Fact sheet for Patients: ConventionUpdate.co.nz       Methodology: Isothermal Nucleic Acid Amplification         COVID-19 RAPID TEST [[id number]] Collected: 09/29/21 1230    Order Status: Completed Specimen: Nasopharyngeal Updated: 09/29/21 1341     Specimen source Nasopharyngeal        COVID-19 rapid test Not detected        Comment:      The specimen is NEGATIVE for SARS-CoV-2, the novel coronavirus associated with COVID-19. A negative result does not rule out COVID-19. This test has been authorized by the FDA under an Emergency Use Authorization (EUA) for use by authorized laboratories. Fact sheet for Healthcare Providers: [employer]  Fact sheet for Patients: [employer]       Methodology: Isothermal Nucleic Acid Amplification         SARS-COV-2, PCR [[id number]] Collected: 09/29/21 1230    Order Status: Completed Specimen: Nasopharyngeal Updated: 09/30/21 0830     Specimen source Nasopharyngeal        SARS-CoV-2 Not detected        Comment:      The specimen is NEGATIVE for SARS-CoV-2, the novel coronavirus associated with COVID-19. This test has been authorized by the FDA under an Emergency Use Authorization (EUA) for use by authorized laboratories. Fact sheet for Healthcare Providers: [employer]       Fact sheet for Patients: [employer]       Methodology: RT-PCR             Inpat Anti-Infectives (From admission, onward)    None        Assessment and Plan:  (Medical Decision Making)   Principal Problem:    Recurrent right pleural effusion (10/6/2021)    I think this is likely trapped due to encompassing cancer of R lung as well as obstructing BI with mass. I am doubtful he will improve with thora or PleurX, but will try to do thora to see if he symptomatically improves. If it does not help him he likely would not benefit from PleurX and would remain an infectious risk without benefit. Possible he could have other options if he responds more to therapy in the future.      Active Problems:    Small cell lung carcinoma, right ([facility] [address] (9/23/2021)    Per oncology      Severe protein-calorie malnutrition ([facility] [address] (10/1/2021)    Weight loss. Full Code    More than 50% of the time documented was spent in face-to-face contact with the patient and in the care of the patient on the floor/unit where the patient is located. Thank you very much for this referral.  We appreciate the opportunity to participate in this patient's care. Will follow along with above stated plan.     Garcia Real MD

## 2021-10-07 NOTE — PROGRESS NOTES
Hospitalist Progress Note   Admit Date:  10/6/2021  2:29 PM   Name:  Rc Houston   Age:  73 y.o. Sex:  male  :  1955   MRN:  [id number]   Room:  [facility]/    Presenting Complaint: Shortness of Breath    Reason(s) for Admission: Recurrent right pleural effusion  [facility] Course & Interval History:   Rc Houston is a 73 y.o. male with medical history of SCLC on R side with recurrent R pleural effusion who presented with SOB. Onset a few days ago, constant, progressively worsening to point where currently he cannot make it all the way up his steps. A/w fatigue, nausea, vomiting. No fevers, CP, abd pain, urinary symptoms    Admitted for recurrent R pleural effusion. pulm and oncology consulted. Subjective (10/07/21): Pt still reports moderate SOB, constant, worse with exertion. Occasional dry cough. No fevers, CP. Assessment & Plan:     Recurrent right pleural effusion    -pulm to see for thora, appreciate help. -on lasix at home, unclear if this will be effective even at a higher dose. may need pleurX?    Discussed with oncology, they ordered outpatient PleurX appointment       Small cell lung carcinoma, right    -oncology consulted       Severe protein-calorie malnutrition     -nutritional supplments       Dispo/Discharge Planning:     Likely home at discharge    Diet:  ADULT DIET Regular  ADULT ORAL NUTRITION SUPPLEMENT AM Snack, PM Snack, HS Snack; Standard High Calorie/High Protein  DVT PPx: lovenox  Code status: Full Code    Hospital Problems as of 10/7/2021 Date Reviewed: 2021          Codes Class Noted - Resolved POA    * (Principal) Recurrent right pleural effusion ICD-10-CM: J90  ICD-9-CM: 511.9  10/6/2021 - Present Yes        Severe protein-calorie malnutrition ([address] (Chronic) ICD-10-CM: M52  ICD-9-CM: 262  10/1/2021 - Present Yes        Small cell lung carcinoma, right (HCC) (Chronic) ICD-10-CM: C34.91  ICD-9-CM: 162.9  2021 - Present Yes Objective:     Patient Vitals for the past 24 hrs:   Temp Pulse Resp BP SpO2   10/07/21 0815 97.4 °F (36.3 °C) (!) 101 18 110/75 99 %   10/07/21 0315 98.2 °F (36.8 °C) (!) 101 17 118/63 97 %   10/06/21 2303 97.7 °F (36.5 °C) 97 18 116/66 97 %   10/06/21 2000 97.9 °F (36.6 °C) (!) 104 24 105/72 99 %   10/06/21 1800  (!) 122  (!) 140/89    10/06/21 1750  (!) 102 29  100 %   10/06/21 1735  (!) 104 26  98 %   10/06/21 1730  (!) 102  95/62 98 %   10/06/21 1700  (!) 103 30 103/62 99 %   10/06/21 1655  (!) 104 22  99 %   10/06/21 1631  (!) 104 26  99 %   10/06/21 1630  (!) 104 (!) 33 (!) 108/58 98 %   10/06/21 1600  (!) 103  101/75 100 %   10/06/21 1559  (!) 101 26  100 %   10/06/21 1530  (!) 108 (!) 32 102/65 99 %   10/06/21 1500  (!) 111 (!) 33 (!) 100/56 99 %   10/06/21 1436     98 %   10/06/21 1430    109/63 100 %   10/06/21 1428 98.6 °F (37 °C) (!) 112 (!) 32       Oxygen Therapy  O2 Sat (%): 99 % (10/07/21 0815)  Pulse via Oximetry: 103 beats per minute (10/06/21 1750)    Estimated body mass index is 23.15 kg/m² as calculated from the following:    Height as of this encounter: 5' 11\" (1.803 m). Weight as of this encounter: 75.3 kg (166 lb). Intake/Output Summary (Last 24 hours) at 10/7/2021 0933  Last data filed at 10/7/2021 0815  Gross per 24 hour   Intake 1268 ml   Output 300 ml   Net 968 ml         Physical Exam:   General:    Well nourished. No overt distress  Head:  Normocephalic, atraumatic  Eyes:  Sclerae appear normal.  Pupils equally round. ENT:  Nares appear normal, no drainage. Moist oral mucosa  Neck:  No restricted ROM. Trachea midline   CV:   Tachy, reg. No m/r/g. No jugular venous distension. Lungs:   Diminished on R side. No wheezing, rhonchi, or rales. Respirations even, unlabored  Abdomen:   nondistended. Extremities: No cyanosis or clubbing. No edema  Skin:     No rashes and normal coloration. Warm and dry. Neuro:  CN II-XII grossly intact. Sensation intact. A&Ox3  Psych:  Normal mood and affect. I have reviewed ordered lab tests and independently visualized imaging below:    Last 24hr Labs:  Recent Results (from the past 24 hour(s))   EKG, 12 LEAD, INITIAL    Collection Time: 10/06/21  2:48 PM   Result Value Ref Range    Ventricular Rate 111 BPM    Atrial Rate 111 BPM    P-R Interval 124 ms    QRS Duration 82 ms    Q-T Interval 310 ms    QTC Calculation (Bezet) 421 ms    Calculated P Axis 68 degrees    Calculated R Axis 87 degrees    Calculated T Axis 66 degrees    Diagnosis       Sinus tachycardia  Otherwise normal ECG  When compared with ECG of 06-OCT-2021 07:56,  Criteria for Septal infarct are no longer Present  Confirmed by MANISH CAVAZOS (), Malina Kimbrough ([id number]) on 10/6/2021 4:33:26 PM     TROPONIN-HIGH SENSITIVITY    Collection Time: 10/06/21  3:00 PM   Result Value Ref Range    Troponin-High Sensitivity 38.3 (H) 0 - 14 pg/mL   CBC WITH AUTOMATED DIFF    Collection Time: 10/06/21  3:00 PM   Result Value Ref Range    WBC 5.8 4.3 - 11.1 K/uL    RBC 3.90 (L) 4.23 - 5.6 M/uL    HGB 9.5 (L) 13.6 - 17.2 g/dL    HCT 29.7 (L) 41.1 - 50.3 %    MCV 76.2 (L) 79.6 - 97.8 FL    MCH 24.4 (L) 26.1 - 32.9 PG    MCHC 32.0 31.4 - 35.0 g/dL    RDW 16.9 (H) 11.9 - 14.6 %    PLATELET 917 745 - 687 K/uL    MPV 10.2 9.4 - 12.3 FL    ABSOLUTE NRBC 0.00 0.0 - 0.2 K/uL    DF AUTOMATED      NEUTROPHILS 79 (H) 43 - 78 %    LYMPHOCYTES 15 13 - 44 %    MONOCYTES 2 (L) 4.0 - 12.0 %    EOSINOPHILS 2 0.5 - 7.8 %    BASOPHILS 1 0.0 - 2.0 %    IMMATURE GRANULOCYTES 2 0.0 - 5.0 %    ABS. NEUTROPHILS 4.6 1.7 - 8.2 K/UL    ABS. LYMPHOCYTES 0.9 0.5 - 4.6 K/UL    ABS. MONOCYTES 0.1 0.1 - 1.3 K/UL    ABS. EOSINOPHILS 0.1 0.0 - 0.8 K/UL    ABS. BASOPHILS 0.0 0.0 - 0.2 K/UL    ABS. IMM.  GRANS. 0.1 0.0 - 0.5 K/UL   PROTHROMBIN TIME + INR    Collection Time: 10/06/21  3:00 PM   Result Value Ref Range    Prothrombin time 14.4 12.6 - 14.5 sec    INR 1.1     PTT    Collection Time: 10/06/21  3:00 PM   Result Value Ref Range    aPTT 36.5 (H) 24.1 - 35.1 SEC   LIPASE    Collection Time: 10/06/21  3:00 PM   Result Value Ref Range    Lipase 93 73 - 393 U/L   LACTIC ACID    Collection Time: 10/06/21  3:00 PM   Result Value Ref Range    Lactic acid 1.2 0.4 - 2.0 MMOL/L   D DIMER    Collection Time: 10/06/21  3:00 PM   Result Value Ref Range    D DIMER 19.19 (H) <0.56 ug/ml(FEU)   NT-PRO BNP    Collection Time: 10/06/21  3:00 PM   Result Value Ref Range    NT pro- (H) 5 - 125 PG/ML   COVID-19 RAPID TEST    Collection Time: 10/06/21  3:19 PM   Result Value Ref Range    Specimen source Nasopharyngeal      COVID-19 rapid test Not detected NOTD     TROPONIN-HIGH SENSITIVITY    Collection Time: 10/06/21  4:50 PM   Result Value Ref Range    Troponin-High Sensitivity 38.7 (H) 0 - 14 pg/mL   METABOLIC PANEL, COMPREHENSIVE    Collection Time: 10/06/21  4:50 PM   Result Value Ref Range    Sodium 134 (L) 136 - 145 mmol/L    Potassium 4.0 3.5 - 5.1 mmol/L    Chloride 100 98 - 107 mmol/L    CO2 26 21 - 32 mmol/L    Anion gap 8 7 - 16 mmol/L    Glucose 92 65 - 100 mg/dL    BUN 15 8 - 23 MG/DL    Creatinine 0.48 (L) 0.8 - 1.5 MG/DL    GFR est AA >60 >60 ml/min/1.73m2    GFR est non-AA >60 >60 ml/min/1.73m2    Calcium 9.3 8.3 - 10.4 MG/DL    Bilirubin, total 0.4 0.2 - 1.1 MG/DL    ALT (SGPT) 13 12 - 65 U/L    AST (SGOT) 39 (H) 15 - 37 U/L    Alk.  phosphatase 54 50 - 136 U/L    Protein, total 7.5 6.3 - 8.2 g/dL    Albumin 2.3 (L) 3.2 - 4.6 g/dL    Globulin 5.2 (H) 2.3 - 3.5 g/dL    A-G Ratio 0.4 (L) 1.2 - 3.5     METABOLIC PANEL, BASIC    Collection Time: 10/07/21  2:25 AM   Result Value Ref Range    Sodium 134 (L) 136 - 145 mmol/L    Potassium 3.8 3.5 - 5.1 mmol/L    Chloride 100 98 - 107 mmol/L    CO2 28 21 - 32 mmol/L    Anion gap 6 (L) 7 - 16 mmol/L    Glucose 82 65 - 100 mg/dL    BUN 14 8 - 23 MG/DL    Creatinine 0.51 (L) 0.8 - 1.5 MG/DL    GFR est AA >60 >60 ml/min/1.73m2    GFR est non-AA >60 >60 ml/min/1.73m2    Calcium 9.2 8.3 - 10.4 MG/DL   CBC WITH AUTOMATED DIFF    Collection Time: 10/07/21  2:25 AM   Result Value Ref Range    WBC 4.0 (L) 4.3 - 11.1 K/uL    RBC 3.68 (L) 4.23 - 5.6 M/uL    HGB 9.0 (L) 13.6 - 17.2 g/dL    HCT 28.4 (L) 41.1 - 50.3 %    MCV 77.2 (L) 79.6 - 97.8 FL    MCH 24.5 (L) 26.1 - 32.9 PG    MCHC 31.7 31.4 - 35.0 g/dL    RDW 16.4 (H) 11.9 - 14.6 %    PLATELET 483 182 - 859 K/uL    MPV 9.5 9.4 - 12.3 FL    ABSOLUTE NRBC 0.00 0.0 - 0.2 K/uL    DF AUTOMATED      NEUTROPHILS 63 43 - 78 %    LYMPHOCYTES 24 13 - 44 %    MONOCYTES 3 (L) 4.0 - 12.0 %    EOSINOPHILS 4 0.5 - 7.8 %    BASOPHILS 2 0.0 - 2.0 %    IMMATURE GRANULOCYTES 5 0.0 - 5.0 %    ABS. NEUTROPHILS 2.5 1.7 - 8.2 K/UL    ABS. LYMPHOCYTES 1.0 0.5 - 4.6 K/UL    ABS. MONOCYTES 0.1 0.1 - 1.3 K/UL    ABS. EOSINOPHILS 0.2 0.0 - 0.8 K/UL    ABS. BASOPHILS 0.1 0.0 - 0.2 K/UL    ABS. IMM. GRANS. 0.2 0.0 - 0.5 K/UL       All Micro Results       Procedure Component Value Units Date/Time    COVID-19 RAPID TEST [[id number]] Collected: 10/06/21 1519    Order Status: Completed Specimen: Nasopharyngeal Updated: 10/06/21 1547     Specimen source Nasopharyngeal        COVID-19 rapid test Not detected        Comment:      The specimen is NEGATIVE for SARS-CoV-2, the novel coronavirus associated with COVID-19. A negative result does not rule out COVID-19. This test has been authorized by the FDA under an Emergency Use Authorization (EUA) for use by authorized laboratories. Fact sheet for Healthcare Providers: ConventionUpdate.co.nz  Fact sheet for Patients: ConventionUpdate.co.nz       Methodology: Isothermal Nucleic Acid Amplification                 Other Studies:  XR CHEST PORT    Result Date: 10/6/2021  EXAM: CHEST X-RAY, 1 VIEW INDICATION: cough. COMPARISON: Chest x-ray 9/10/2021 TECHNIQUE: Single AP view of the chest was obtained.  FINDINGS: Stable large right pleural effusion and dense opacification throughout the right lung consistent with the known mediastinal mass. Mild opacification in the left lung base. 1.  Stable opacification throughout the right hemithorax consistent with the known mass and pleural effusion. 2.  Mild opacification in the left lung base could reflect atelectasis or new infiltrates.        Current Meds:  Current Facility-Administered Medications   Medication Dose Route Frequency    budesonide-formoteroL (SYMBICORT) 160-4.5 mcg/actuation HFA inhaler 2 Puff  2 Puff Inhalation BID RT    [Held by provider] amLODIPine (NORVASC) tablet 5 mg  5 mg Oral DAILY    aspirin delayed-release tablet 81 mg  81 mg Oral DAILY    furosemide (LASIX) tablet 20 mg  20 mg Oral DAILY    gabapentin (NEURONTIN) capsule 300 mg  300 mg Oral TID    HYDROcodone-acetaminophen (NORCO)  mg tablet 1 Tablet  1 Tablet Oral Q4H PRN    magnesium oxide (MAG-OX) tablet 400 mg  400 mg Oral BID    mirtazapine (REMERON) tablet 15 mg  15 mg Oral QHS    morphine CR (MS CONTIN) tablet 15 mg  15 mg Oral Q12H    pantoprazole (PROTONIX) tablet 40 mg  40 mg Oral DAILY    potassium chloride (KLOR-CON) tablet 10 mEq  10 mEq Oral DAILY    promethazine (PHENERGAN) with saline injection 25 mg  25 mg IntraVENous Q6H PRN    sodium chloride (NS) flush 5-40 mL  5-40 mL IntraVENous Q8H    sodium chloride (NS) flush 5-40 mL  5-40 mL IntraVENous PRN    acetaminophen (TYLENOL) tablet 650 mg  650 mg Oral Q6H PRN    Or    acetaminophen (TYLENOL) suppository 650 mg  650 mg Rectal Q6H PRN    polyethylene glycol (MIRALAX) packet 17 g  17 g Oral DAILY PRN    bisacodyL (DULCOLAX) suppository 10 mg  10 mg Rectal DAILY PRN    ondansetron (ZOFRAN ODT) tablet 4 mg  4 mg Oral Q8H PRN    Or    ondansetron (ZOFRAN) injection 4 mg  4 mg IntraVENous Q6H PRN    famotidine (PEPCID) tablet 20 mg  20 mg Oral BID PRN    alum-mag hydroxide-simeth (MYLANTA) oral suspension 15 mL  15 mL Oral Q6H PRN    HYDROcodone-acetaminophen (NORCO) 5-325 mg per tablet 1 Tablet  1 Tablet Oral Q4H PRN enoxaparin (LOVENOX) injection 40 mg  40 mg SubCUTAneous Q24H       Signed:  Soraya Hyman MD    Part of this note may have been written by using a voice dictation software. The note has been proof read but may still contain some grammatical/other typographical errors.

## 2021-10-07 NOTE — PROGRESS NOTES
Physician Progress Note      PATIENT:               Tomasa Andres  CSN #:                  [id number]  :                       1955  ADMIT DATE:       10/6/2021 2:29 PM  [address] [place] DATE:  RESPONDING  PROVIDER #:        Rinku Rodríguez MD          QUERY TEXT:    Patient admitted with shortness of breath, noted to have recurrent rt pleural effusion. If possible, please document in progress notes and d/c summary further specificity regarding the type/underlying cause of pleural effusion: The medical record reflects the following:  Risk Factors: SCLC, Recurrent Rt Pl Effusion  Clinical Indicators: 10/6- CXR-Stable opacification throughout the right hemithorax consistent with the known mass and pleural   effusion. Mild opacification in the left lung base could reflect atelectasis or new infiltrates. D-Dimer 19.9. Treatment: Lasix IV, Oncology Consult, Pulmonary Consult for Thora. Options provided:  -- Empyema/Bacterial pleural effusion due to *** (organism, if known)  -- Malignant pleural effusion due to SCLC  -- Other - I will add my own diagnosis  -- Disagree - Not applicable / Not valid  -- Disagree - Clinically unable to determine / Unknown  -- Refer to Clinical Documentation Reviewer    PROVIDER RESPONSE TEXT:    Patient has a Malignant pleural effusion due to [address]     Query created by: jane ocy on 10/7/2021 12:08 PM      Electronically signed by:  Rinku Rodríguez MD 10/7/2021 12:39 PM

## 2021-10-07 NOTE — PROCEDURES
PROCEDURE:  THERAPEUTIC THORACENTESIS     PRE-OP DIAGNOSIS:  Right PLEURAL EFFUSION    POST-OP DIAGNOSIS:  Right PLEURAL EFFUSION    VOLUME REMOVED:  800 cc    ANESTHESIA:  LOCAL ANESTHESIA WITH 1% LIDOCAINE 10 CC TOTAL. CHEST ULTRASOUND FINDINGS:    A Turbo-M, Sonosite ultrasound with a 5-16 mHz probe was used to image the chest and localize the pleural effusion on the right chest.    A moderate anechoic space was seen on the right consistent with an uncomplicated pleural effusion. DESCRIPTION OF PROCEDURE:    After obtaining informed consent and localizing the safest location for thoracentesis, the  8th intercostal space was marked with a blunt, plastic needle cap in the mid scapular line. An [employer]fold AK-0100 Pleral-Seal thoracentesis kit was used to perform the procedure. The skin was cleansed with the supplied chlorhexidine swab and then draped in the usual fashion. Using the previously marked location as a guide, a 22 G 1.5 inch needle was used to inject 1% lidocaine into the skin and subcutaneous tissue, as well as onto the underlying rib and inter-costal muscles. Pleural fluid was aspirated to assure proper location and additional lidocaine was injected into the pleural space prior to removing the anesthesia needle. A 3mm incision was then made with the supplied scalpel in the usual fashion to facilitate the insertion of the thoracentesis needle. The needle with an 8 Japanese thoracentesis catheter was then introduced into the chest through the previously made incision in the usual fashion, the rib localized with the needle, and the catheter then marched over the rib into the pleural space. After aspirating fluid, the thoracentesis catheter was then placed into the chest using the needle itself as a trocar. The needle was then removed and the catheter was attached to the supplied tubing without complication. 800 cc of bloody fluid was aspirated and sent for analysis.     Known malignant effusion. Post procedure US confirmed incomplete drainage of the effusion and presence of lung sliding, ruling out pneumothorax. ([id number])    EBL:  <0VW    COMPLICATIONS:  None. Tolerated well. Fluid stopped draining at 800cc. He will monitor response to fluid drainage and see if he symptomatically benefits from drainage. If he does we could offer him a pleurX. Will check CXR to evaluate what his lung looks like after current drainage.     Deborah Valero MD

## 2021-10-07 NOTE — PROGRESS NOTES
Comprehensive Nutrition Assessment    Type and Reason for Visit: Initial, Positive nutrition screen  Best Practice Alert for Malnutrition Screening Tool: Recently Lost Weight Without Trying: Yes, If Yes, How Much Weight Loss: Unsure, Eating Poorly Due to Decreased Appetite: No    Nutrition Recommendations/Plan:    Continue with current diet   Continue Ensure with all meals   Daily goal of 6 ensures to meet nutritional needs. Malnutrition Assessment:  Malnutrition Status: Severe malnutrition  Context: Chronic illness  Findings of clinical characteristics of malnutrition:   Energy Intake:  7 - 75% or less est energy requirements for 1 month or longer  Weight Loss:  7.0 - Greater than 7.5% over 3 months     Body Fat Loss:   ,     Muscle Mass Loss:  7 - Severe muscle mass loss, Temples (temporalis)    Nutrition Assessment:   Nutrition History: Was eating normally ( at least 2 meals/d) up until the end of May 2021 when had chest pain that negatively impacted his appetite, also with daily nausea. He reports he was weighing 226# at that time. He would eat as much as his appetite would allow,but it amounted to only bites of food. He started drinking 1 bottle of Ensure Enlive per day in June. 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 Recent diagnosis of widely metastatic SCLC. Started cycle 1 of treatment about 1 week ago. Presented with recurrent right pleural effusion. Daily Update:  Patient reports that nothing has changed since admission last week. He is only drinking liquids (water, juice and ensure) he is not eating anything. He is able to tell me he is supposed to drink between 3-6 ensures each day. He states he had ordered breakfast but no signs of ensure of anything meals in room. Encouraged patient to also try items like ice cream, pudding which are similar to liquids. Patient seemed willing.      Nutrition Related Findings:   Severe muscle wasting Wound Type: None    Current Nutrition Therapies:  ADULT DIET Regular  ADULT ORAL NUTRITION SUPPLEMENT AM Snack, PM Snack, HS Snack; Standard High Calorie/High Protein    Current Intake:   Average Meal Intake: 0% (patient reports no food intake since last admission) Average Supplement Intake: Unable to assess (patient states he is drinking ensures but unable to verify)      Anthropometric Measures:  Height: 5' 11\" (180.3 cm)  Current Body Wt: 75.3 kg (166 lb 0.1 oz), Weight source: Not specified  BMI: 23.2, Normal weight (BMI 18.5-24. 9)     Ideal Body Weight (lbs) (Calculated): 172 lbs (78 kg), 96.5 %  Usual Body Wt: 83 kg (183 lb) (8/16 per EMR), Percent weight change: -9.3          Edema: No data recorded   Estimated Daily Nutrient Needs:  Energy (kcal/day): 7482-4061 (Kcal/kg (25-30), Weight Used: Current (75.3 kg))  Protein (g/day):  (20% kcals) Weight Used: ( )  Fluid (ml/day):   (1 ml/kcal)    Nutrition Diagnosis:   · Severe malnutrition, In context of chronic illness related to inadequate protein-energy intake as evidenced by intake 0-25%, weight loss 7.5% in 3 months, severe muscle loss    · Inadequate oral intake related to  (poor appetite and nausea) as evidenced by  (intake <25% of estimated needs)    Nutrition Interventions:   Food and/or Nutrient Delivery: Continue current diet, Continue oral nutrition supplement     Coordination of Nutrition Care: Continue to monitor while inpatient    Goals:       Active Goal: Intake >50% of estimated needs by follow up    Nutrition Monitoring and Evaluation:      Food/Nutrient Intake Outcomes: Food and nutrient intake, Supplement intake  Physical Signs/Symptoms Outcomes: Nausea/vomiting    Discharge Planning:    Continue oral nutrition supplement    Electronically signed by Corine Lefort MS, RD, LD on 10/7/2021 at 10:51 AM.    Disaster Mode Active

## 2021-10-07 NOTE — MANAGEMENT PLAN
[facility] Hematology & Oncology        Inpatient Hematology / Oncology Plan of Care    Reason for Consult:  Recurrent right pleural effusion [J90]  Referring Physician:  Murali Mckenna MD    History of Present Illness:  Mr. Rachel Parry is a 73 y.o. male admitted on 10/6/2021. The primary encounter diagnosis was Pleural effusion. Diagnoses of SOB (shortness of breath) and Chest pain, unspecified type were also pertinent to this visit. Mr Rachel Parry has a PMH of traumatic injury to face with facial reconstruction. He is a current tobacco smoker. He is a known patient of Dr Josef Goldman with recent diagnosis of right hilar mass and right pleural effusion following evaluation for chest pain and shortness of breath. He underwent thoracentesis 8/16/21 with 1600 mL removed, cytology negative for malignancy. PET scan showed hypermetabolic nodes, large mediastinal mass and diffuse bone metastases. He was referred to radiation oncology and received palliative XRT x1 fx to rib metastasis and consideration for potential XRT. He underwent biopsy of left axillary node and pathology indicated SCLC. It was planned for him to start carboplatin, etoposide, Tecentriq as an outpatient. However, he was admitted due to performance status and he completed his first cycle of treatment with carboplatin and etoposide inpatient on 10/2/21 and discharged home on 10/3/21. Mr Rachel Parry returned to ED 10/6/21 with complaints of shortness of breath. CXR showed stable large right pleural effusion and opacification consistent with known mediastinal mass. Pulmonology consulted for possible thoracentesis. COVID testing negative. We were asked for recommendations for our patient.     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, blind in left eye,  deaf in left ear,\"every bone in skull was broken\"     Past Surgical History:   Procedure Laterality Date    HX APPENDECTOMY      HX HEENT  9/2009    facial reconstruction post trauma     Family History   Problem Relation Age of Onset    Diabetes Maternal Aunt     Diabetes Paternal Aunt      Social History     Socioeconomic History    Marital status:      Spouse name: Not on file    Number of children: Not on file    Years of education: Not on file    Highest education level: Not on file   Occupational History    Not on file   Tobacco Use    Smoking status: Current Every Day Smoker     Packs/day: 0.50     Years: 30.00     Pack years: 15.00    Smokeless tobacco: Never Used   Substance and Sexual Activity    Alcohol use: Not Currently     Alcohol/week: 0.8 standard drinks     Types: 1 Cans of beer per week    Drug use: No    Sexual activity: Not on file   Other Topics Concern     Service Not Asked    Blood Transfusions Not Asked    Caffeine Concern Not Asked    Occupational Exposure Not Asked    Hobby Hazards Not Asked    Sleep Concern Not Asked    Stress Concern Not Asked    Weight Concern Not Asked    Special Diet Not Asked    Back Care Not Asked    Exercise Not Asked    Bike Helmet Not Asked    Seat Belt Not Asked    Self-Exams Not Asked   Social History Narrative    Not on file     Social Determinants of Health     Financial Resource Strain:     Difficulty of Paying Living Expenses:    Food Insecurity:     Worried About Running Out of Food in the Last Year:     Ran Out of Food in the Last Year:    Transportation Needs:     Lack of Transportation (Medical):      Lack of Transportation (Non-Medical):    Physical Activity:     Days of Exercise per Week:     Minutes of Exercise per Session:    Stress:     Feeling of Stress :    Social Connections:     Frequency of Communication with Friends and Family:     Frequency of Social Gatherings with Friends and Family:     Attends Denominational Services:     Active Member of Clubs or Organizations:     Attends Club or Organization Meetings:     Marital Status:    Intimate Partner Violence:     Fear of Current or Ex-Partner:     Emotionally Abused:     Physically Abused:     Sexually Abused:      Current Facility-Administered Medications   Medication Dose Route Frequency Provider Last Rate Last Admin    budesonide-formoteroL (SYMBICORT) 160-4.5 mcg/actuation HFA inhaler 2 Puff  2 Puff Inhalation BID RT Stephenie Johnson MD   2 Puff at 10/07/21 0835    [Held by provider] amLODIPine (NORVASC) tablet 5 mg  5 mg Oral DAILY Stephenie Johnson MD        aspirin delayed-release tablet 81 mg  81 mg Oral DAILY Stephenie Johnson MD   81 mg at 10/07/21 1016    furosemide (LASIX) tablet 20 mg  20 mg Oral DAILY Stephenie Johnson MD   20 mg at 10/07/21 1016    gabapentin (NEURONTIN) capsule 300 mg  300 mg Oral TID Stephenie Johnson MD   300 mg at 10/07/21 1016    HYDROcodone-acetaminophen (NORCO)  mg tablet 1 Tablet  1 Tablet Oral Q4H PRN Stephenie Johnson MD   1 Tablet at 10/07/21 1034    magnesium oxide (MAG-OX) tablet 400 mg  400 mg Oral BID Stephenie Johnson MD   400 mg at 10/07/21 1016    mirtazapine (REMERON) tablet 15 mg  15 mg Oral QHS Stephenie Johnson MD   15 mg at 10/06/21 2226    morphine CR (MS CONTIN) tablet 15 mg  15 mg Oral Q12H Stephneie Johnson MD   15 mg at 10/07/21 1016    pantoprazole (PROTONIX) tablet 40 mg  40 mg Oral DAILY Stephenie Johnson MD   40 mg at 10/07/21 1016    potassium chloride (KLOR-CON) tablet 10 mEq  10 mEq Oral DAILY Stephenie Johnson MD   10 mEq at 10/07/21 1016    promethazine (PHENERGAN) with saline injection 25 mg  25 mg IntraVENous Q6H PRN Stephenie Johnson MD        sodium chloride (NS) flush 5-40 mL  5-40 mL IntraVENous Earleen Stephanie, Laura Cesar MD   10 mL at 10/07/21 0524    sodium chloride (NS) flush 5-40 mL  5-40 mL IntraVENous PRN Manuel Willams MD        acetaminophen (TYLENOL) tablet 650 mg  650 mg Oral Q6H PRN Manuel Willams MD        Or    acetaminophen (TYLENOL) suppository 650 mg  650 mg Rectal Q6H PRN Manuel Willams MD        polyethylene glycol [facility]) packet 17 g  17 g Oral DAILY PRN Manuel Willams MD        bisacodyL (DULCOLAX) suppository 10 mg  10 mg Rectal DAILY PRN Manuel Willams MD        ondansetron (ZOFRAN ODT) tablet 4 mg  4 mg Oral Q8H PRN Manuel Willams MD        Or    ondansetron [facility] injection 4 mg  4 mg IntraVENous Q6H PRDEWAYNE Willams MD        famotidine (PEPCID) tablet 20 mg  20 mg Oral BID PRN Manuel Willams MD        [facility]) oral suspension 15 mL  15 mL Oral Q6H PRN Manuel Willams MD        HYDROcodone-acetaminophen [facility]) 5-325 mg per tablet 1 Tablet  1 Tablet Oral Q4H PRDEWAYNE Willams MD   1 Tablet at 10/06/21 2017    enoxaparin (LOVENOX) injection 40 mg  40 mg SubCUTAneous Q24H Manuel Willams MD   40 mg at 10/06/21 2017       OBJECTIVE:  Patient Vitals for the past 8 hrs:   BP Temp Pulse Resp SpO2 Height   10/07/21 1158 (!) 105/57 98.8 °F (37.1 °C) (!) 103 18 100 %    10/07/21 0935      5' 11\" (1.803 m)   10/07/21 0815 110/75 97.4 °F (36.3 °C) (!) 101 18 99 %      Temp (24hrs), Av.1 °F (36.7 °C), Min:97.4 °F (36.3 °C), Max:98.8 °F (37.1 °C)      10/07 0701 - 10/07 1900  In: 240 [P.O.:240]  Out: 300 [Urine:300]    Physical Exam:  Constitutional: Chronically ill-appearing elderly male in no acute distress, lying comfortably in the hospital bed. HEENT: Normocephalic and atraumatic. Oropharynx is clear, mucous membranes are moist.  Extraocular muscles are intact. Sclerae anicteric. Neck supple without JVD. No thyromegaly present. Skin Warm and dry. No bruising and no rash noted. No erythema. No pallor.     Respiratory Lungs are diminished on R without wheezes, rales or rhonchi, normal air exchange without accessory muscle use. CVS Normal rate, regular rhythm and normal S1 and S2. No murmurs, gallops, or rubs. Abdomen Soft, nontender and nondistended, normoactive bowel sounds. No palpable mass. No hepatosplenomegaly. Neuro Grossly nonfocal with no obvious sensory or motor deficits. MSK Normal range of motion in general.  No edema and no tenderness. Psych Appropriate mood and affect. Labs:    Recent Results (from the past 24 hour(s))   EKG, 12 LEAD, INITIAL    Collection Time: 10/06/21  2:48 PM   Result Value Ref Range    Ventricular Rate 111 BPM    Atrial Rate 111 BPM    P-R Interval 124 ms    QRS Duration 82 ms    Q-T Interval 310 ms    QTC Calculation (Bezet) 421 ms    Calculated P Axis 68 degrees    Calculated R Axis 87 degrees    Calculated T Axis 66 degrees    Diagnosis       Sinus tachycardia  Otherwise normal ECG  When compared with ECG of 06-OCT-2021 07:56,  Criteria for Septal infarct are no longer Present  Confirmed by MANISH CAVAZOS (), [place]en Scott ([id number]) on 10/6/2021 4:33:26 PM     TROPONIN-HIGH SENSITIVITY    Collection Time: 10/06/21  3:00 PM   Result Value Ref Range    Troponin-High Sensitivity 38.3 (H) 0 - 14 pg/mL   CBC WITH AUTOMATED DIFF    Collection Time: 10/06/21  3:00 PM   Result Value Ref Range    WBC 5.8 4.3 - 11.1 K/uL    RBC 3.90 (L) 4.23 - 5.6 M/uL    HGB 9.5 (L) 13.6 - 17.2 g/dL    HCT 29.7 (L) 41.1 - 50.3 %    MCV 76.2 (L) 79.6 - 97.8 FL    MCH 24.4 (L) 26.1 - 32.9 PG    MCHC 32.0 31.4 - 35.0 g/dL    RDW 16.9 (H) 11.9 - 14.6 %    PLATELET 722 427 - 644 K/uL    MPV 10.2 9.4 - 12.3 FL    ABSOLUTE NRBC 0.00 0.0 - 0.2 K/uL    DF AUTOMATED      NEUTROPHILS 79 (H) 43 - 78 %    LYMPHOCYTES 15 13 - 44 %    MONOCYTES 2 (L) 4.0 - 12.0 %    EOSINOPHILS 2 0.5 - 7.8 %    BASOPHILS 1 0.0 - 2.0 %    IMMATURE GRANULOCYTES 2 0.0 - 5.0 %    ABS. NEUTROPHILS 4.6 1.7 - 8.2 K/UL    ABS.  LYMPHOCYTES 0.9 0.5 - 4.6 K/UL    ABS. MONOCYTES 0.1 0.1 - 1.3 K/UL    ABS. EOSINOPHILS 0.1 0.0 - 0.8 K/UL    ABS. BASOPHILS 0.0 0.0 - 0.2 K/UL    ABS. IMM. GRANS. 0.1 0.0 - 0.5 K/UL   PROTHROMBIN TIME + INR    Collection Time: 10/06/21  3:00 PM   Result Value Ref Range    Prothrombin time 14.4 12.6 - 14.5 sec    INR 1.1     PTT    Collection Time: 10/06/21  3:00 PM   Result Value Ref Range    aPTT 36.5 (H) 24.1 - 35.1 SEC   LIPASE    Collection Time: 10/06/21  3:00 PM   Result Value Ref Range    Lipase 93 73 - 393 U/L   LACTIC ACID    Collection Time: 10/06/21  3:00 PM   Result Value Ref Range    Lactic acid 1.2 0.4 - 2.0 MMOL/L   D DIMER    Collection Time: 10/06/21  3:00 PM   Result Value Ref Range    D DIMER 19.19 (H) <0.56 ug/ml(FEU)   NT-PRO BNP    Collection Time: 10/06/21  3:00 PM   Result Value Ref Range    NT pro- (H) 5 - 125 PG/ML   COVID-19 RAPID TEST    Collection Time: 10/06/21  3:19 PM   Result Value Ref Range    Specimen source Nasopharyngeal      COVID-19 rapid test Not detected NOTD     TROPONIN-HIGH SENSITIVITY    Collection Time: 10/06/21  4:50 PM   Result Value Ref Range    Troponin-High Sensitivity 38.7 (H) 0 - 14 pg/mL   METABOLIC PANEL, COMPREHENSIVE    Collection Time: 10/06/21  4:50 PM   Result Value Ref Range    Sodium 134 (L) 136 - 145 mmol/L    Potassium 4.0 3.5 - 5.1 mmol/L    Chloride 100 98 - 107 mmol/L    CO2 26 21 - 32 mmol/L    Anion gap 8 7 - 16 mmol/L    Glucose 92 65 - 100 mg/dL    BUN 15 8 - 23 MG/DL    Creatinine 0.48 (L) 0.8 - 1.5 MG/DL    GFR est AA >60 >60 ml/min/1.73m2    GFR est non-AA >60 >60 ml/min/1.73m2    Calcium 9.3 8.3 - 10.4 MG/DL    Bilirubin, total 0.4 0.2 - 1.1 MG/DL    ALT (SGPT) 13 12 - 65 U/L    AST (SGOT) 39 (H) 15 - 37 U/L    Alk.  phosphatase 54 50 - 136 U/L    Protein, total 7.5 6.3 - 8.2 g/dL    Albumin 2.3 (L) 3.2 - 4.6 g/dL    Globulin 5.2 (H) 2.3 - 3.5 g/dL    A-G Ratio 0.4 (L) 1.2 - 3.5     METABOLIC PANEL, BASIC    Collection Time: 10/07/21  2:25 AM   Result Value Ref Range    Sodium 134 (L) 136 - 145 mmol/L    Potassium 3.8 3.5 - 5.1 mmol/L    Chloride 100 98 - 107 mmol/L    CO2 28 21 - 32 mmol/L    Anion gap 6 (L) 7 - 16 mmol/L    Glucose 82 65 - 100 mg/dL    BUN 14 8 - 23 MG/DL    Creatinine 0.51 (L) 0.8 - 1.5 MG/DL    GFR est AA >60 >60 ml/min/1.73m2    GFR est non-AA >60 >60 ml/min/1.73m2    Calcium 9.2 8.3 - 10.4 MG/DL   CBC WITH AUTOMATED DIFF    Collection Time: 10/07/21  2:25 AM   Result Value Ref Range    WBC 4.0 (L) 4.3 - 11.1 K/uL    RBC 3.68 (L) 4.23 - 5.6 M/uL    HGB 9.0 (L) 13.6 - 17.2 g/dL    HCT 28.4 (L) 41.1 - 50.3 %    MCV 77.2 (L) 79.6 - 97.8 FL    MCH 24.5 (L) 26.1 - 32.9 PG    MCHC 31.7 31.4 - 35.0 g/dL    RDW 16.4 (H) 11.9 - 14.6 %    PLATELET 735 858 - 569 K/uL    MPV 9.5 9.4 - 12.3 FL    ABSOLUTE NRBC 0.00 0.0 - 0.2 K/uL    DF AUTOMATED      NEUTROPHILS 63 43 - 78 %    LYMPHOCYTES 24 13 - 44 %    MONOCYTES 3 (L) 4.0 - 12.0 %    EOSINOPHILS 4 0.5 - 7.8 %    BASOPHILS 2 0.0 - 2.0 %    IMMATURE GRANULOCYTES 5 0.0 - 5.0 %    ABS. NEUTROPHILS 2.5 1.7 - 8.2 K/UL    ABS. LYMPHOCYTES 1.0 0.5 - 4.6 K/UL    ABS. MONOCYTES 0.1 0.1 - 1.3 K/UL    ABS. EOSINOPHILS 0.2 0.0 - 0.8 K/UL    ABS. BASOPHILS 0.1 0.0 - 0.2 K/UL    ABS. IMM. GRANS. 0.2 0.0 - 0.5 K/UL       Imaging:  XR CHEST PORT [[id number]] Collected: 10/06/21 2598   Order Status: Completed Updated: 10/06/21 1505   Narrative:     EXAM: CHEST X-RAY, 1 VIEW     INDICATION: cough. COMPARISON: Chest x-ray 9/10/2021     TECHNIQUE: Single AP view of the chest was obtained. FINDINGS: Stable large right pleural effusion and dense opacification throughout   the right lung consistent with the known mediastinal mass. Mild opacification in   the left lung base. Impression:     1.  Stable opacification throughout the right hemithorax consistent with the   known mass and pleural effusion. 2.  Mild opacification in the left lung base could reflect atelectasis or new   infiltrates. ASSESSMENT:  Problem List  Date Reviewed: 9/29/2021        Codes Class Noted    * (Principal) Recurrent right pleural effusion ICD-10-CM: J90  ICD-9-CM: 511.9  10/6/2021        Severe protein-calorie malnutrition ([facility] (Chronic) ICD-10-CM: E43  ICD-9-CM: 262  10/1/2021        Listed for admission to hospital ICD-10-CM: Z75.1  ICD-9-CM: V63.2  9/29/2021        Dehydration ICD-10-CM: E86.0  ICD-9-CM: 276.51  9/23/2021        Nausea & vomiting ICD-10-CM: R11.2  ICD-9-CM: 787.01  9/23/2021        Nausea ICD-10-CM: R11.0  ICD-9-CM: 787.02  9/23/2021        Hypokalemia ICD-10-CM: E87.6  ICD-9-CM: 276.8  9/23/2021        Small cell lung carcinoma, right (HCC) (Chronic) ICD-10-CM: C34.91  ICD-9-CM: 162.9  9/23/2021        Mediastinal mass ICD-10-CM: J98.59  ICD-9-CM: 786.6  8/16/2021        Shortness of breath ICD-10-CM: R06.02  ICD-9-CM: 786.05  8/16/2021        Axillary adenopathy ICD-10-CM: R59.0  ICD-9-CM: 785.6  8/16/2021        Adrenal nodule ([facility] ICD-10-CM: E27.8  ICD-9-CM: 255.8  8/16/2021              Mr. Mirela Laird is a 73 y.o. male admitted on 10/6/2021. The primary encounter diagnosis was Pleural effusion. Diagnoses of SOB (shortness of breath) and Chest pain, unspecified type were also pertinent to this visit. Mr Mirela Laird has a PMH of traumatic injury to face with facial reconstruction. He is a current tobacco smoker. He is a known patient of Dr Talita Tatum with recent diagnosis of right hilar mass and right pleural effusion following evaluation for chest pain and shortness of breath. He underwent thoracentesis 8/16/21 with 1600 mL removed, cytology negative for malignancy. PET scan showed hypermetabolic nodes, large mediastinal mass and diffuse bone metastases. He was referred to radiation oncology and received palliative XRT x1 fx to rib metastasis and consideration for potential XRT. He underwent biopsy of left axillary node and pathology indicated SCLC.  It was planned for him to start carboplatin, etoposide, Tecentriq as an outpatient. However, he was admitted due to performance status and he completed his first cycle of treatment with carboplatin and etoposide inpatient on 10/2/21 and discharged home on 10/3/21. Mr Kassie Elliott returned to ED 10/6/21 with complaints of shortness of breath. CXR showed stable large right pleural effusion and opacification consistent with known mediastinal mass. Pulmonology consulted for possible thoracentesis. COVID testing negative. Oncology asked to assume care of our known patient. RECOMMENDATIONS:    Metastatic small cell lung cancer (lymph nodes, bones)  - s/p C1 carboplatin/etoposide inpatient 9/30-10/2 (Tecentriq omitted due to inpatient status). C2 carbo/etop/atez due 10/21.  - s/p palliative XRT to left rib lesion  - MRI brain pending (scheduled outpatient)    Recurrent right pleural effusion  - Last thoracentesis 8/2021 - negative cytology  - Pulmonology consulted for thoracentesis  - Expect recurrence of effusion to resolve with ongoing cancer-related treatment. If they persist, consider pleurx down the line. Cancer-related pain   - Continue home pain regimen    Lab studies and imaging studies were personally reviewed. Pertinent old records were reviewed. Thank you for allowing us to participate in the care of Mr. Kassie Elliott. Formal consult note by Dr. Carlota Alcantara to follow. Possible discharge home today per Dr. Naty Barajas. Mr. Kassie Elliott will need to f/u with Dr. Vinayak Granda within one week of discharge. If pt is not discharged, we can assume care.          Mag Guerin, PETR  [facility]  [address] [place] [address]  Office : [phone]  Fax : [phone]

## 2021-10-07 NOTE — PROGRESS NOTES
EOS    - prn Norco given x1, pain controlled  - consult to pulmonology and oncology today  - rested quietly throughout night, vss, no needs voiced  - report given to oncoming nurse

## 2021-10-07 NOTE — CONSULTS
Inpatient Hematology / Oncology Consult Note    Reason for Consult:  Recurrent right pleural effusion [J90]  Referring Physician:  Adriana Ellis MD    History of Present Illness:  Mr. Fish Watson is a 73 y.o. male admitted on 10/6/2021 with a primary diagnosis of The primary encounter diagnosis was Pleural effusion. Diagnoses of SOB (shortness of breath), Chest pain, unspecified type, Recurrent right pleural effusion, Severe protein-calorie malnutrition ([address] and Small cell lung carcinoma, right ([address] were also pertinent to this visit. .      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 He had undergone right-sided thoracentesis 8/16/2021 with 1600 mL removed, cytology negative. He has received a single fraction palliative XRT to rib metastasis, and more recently cycle 1 carboplatin/etoposide as an inpatient few days ago. He is now readmitted through ED with worsening shortness of breath with x-ray showing large right sided pleural effusion. Pulmonology and oncology were consulted. Review of Systems:  As mentioned above. All other systems reviewed in full and are unremarkable.        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, blind in left eye,  deaf in left ear,\"every bone in skull was broken\"     Past Surgical History:   Procedure Laterality Date    HX APPENDECTOMY      HX HEENT  9/2009    facial reconstruction post trauma     Family History   Problem Relation Age of Onset    Diabetes Maternal Aunt     Diabetes Paternal Aunt      Social History     Socioeconomic History    Marital status:      Spouse name: Not on file    Number of children: Not on file    Years of education: Not on file    Highest education level: Not on file   Occupational History    Not on file   Tobacco Use    Smoking status: Current Every Day Smoker     Packs/day: 0.50     Years: 30.00     Pack years: 15.00    Smokeless tobacco: Never Used   Substance and Sexual Activity    Alcohol use: Not Currently     Alcohol/week: 0.8 standard drinks     Types: 1 Cans of beer per week    Drug use: No    Sexual activity: Not on file   Other Topics Concern     Service Not Asked    Blood Transfusions Not Asked    Caffeine Concern Not Asked    Occupational Exposure Not Asked    Hobby Hazards Not Asked    Sleep Concern Not Asked    Stress Concern Not Asked    Weight Concern Not Asked    Special Diet Not Asked    Back Care Not Asked    Exercise Not Asked    Bike Helmet Not Asked    Seat Belt Not Asked    Self-Exams Not Asked   Social History Narrative    Not on file     Social Determinants of Health     Financial Resource Strain:     Difficulty of Paying Living Expenses:    Food Insecurity:     Worried About Running Out of Food in the Last Year:     Ran Out of Food in the Last Year:    Transportation Needs:     Lack of Transportation (Medical):      Lack of Transportation (Non-Medical):    Physical Activity:     Days of Exercise per Week:     Minutes of Exercise per Session:    Stress:     Feeling of Stress :    Social Connections:     Frequency of Communication with Friends and Family:     Frequency of Social Gatherings with Friends and Family:     Attends Islam Services:     Active Member of Clubs or Organizations:     Attends Club or Organization Meetings:     Marital Status:    Intimate Partner Violence:     Fear of Current or Ex-Partner:     Emotionally Abused:     Physically Abused:     Sexually Abused:      Current Facility-Administered Medications   Medication Dose Route Frequency Provider Last Rate Last Admin    budesonide-formoteroL (SYMBICORT) 160-4.5 mcg/actuation HFA inhaler 2 Puff  2 Puff Inhalation BID RT Eri Neely MD   2 Puff at 10/07/21 0835    [Held by provider] amLODIPine (NORVASC) tablet 5 mg  5 mg Oral DAILY Eri Neely MD        aspirin delayed-release tablet 81 mg  81 mg Oral DAILY Eri Neely MD   81 mg at 10/07/21 1016    furosemide (LASIX) tablet 20 mg  20 mg Oral DAILY Eri Neely MD   20 mg at 10/07/21 1016    gabapentin (NEURONTIN) capsule 300 mg  300 mg Oral TID Eri Neely MD   300 mg at 10/07/21 1650    HYDROcodone-acetaminophen (NORCO)  mg tablet 1 Tablet  1 Tablet Oral Q4H PRN Eri Neely MD   1 Tablet at 10/07/21 1650    magnesium oxide (MAG-OX) tablet 400 mg  400 mg Oral BID Eri Neely MD   400 mg at 10/07/21 1651    mirtazapine (REMERON) tablet 15 mg  15 mg Oral QHS Eri Neley MD   15 mg at 10/06/21 2226    morphine CR (MS CONTIN) tablet 15 mg  15 mg Oral Q12H Eri Neely MD   15 mg at 10/07/21 1016    pantoprazole (PROTONIX) tablet 40 mg  40 mg Oral DAILY Eri Neely MD   40 mg at 10/07/21 1016    potassium chloride (KLOR-CON) tablet 10 mEq  10 mEq Oral DAILY Eri Neely MD   10 mEq at 10/07/21 1016    promethazine (PHENERGAN) with saline injection 25 mg  25 mg IntraVENous Q6H PRN Eri Neely MD        sodium chloride (NS) flush 5-40 mL  5-40 mL IntraVENous Marni Lunsford MD   10 mL at 10/07/21 1400    sodium chloride (NS) flush 5-40 mL  5-40 mL IntraVENous PRN Eri Neely MD        acetaminophen (TYLENOL) tablet 650 mg  650 mg Oral Q6H PRN Eri Neely MD        Or    acetaminophen (TYLENOL) suppository 650 mg  650 mg Rectal Q6H PRN Moncho Gomez MD        polyethylene glycol [facility]) packet 17 g  17 g Oral DAILY PRN Moncho Gomez MD        bisacodyL (DULCOLAX) suppository 10 mg  10 mg Rectal DAILY PRN Moncho Gomez MD        ondansetron [facility] ODT) tablet 4 mg  4 mg Oral Q8H PRN Moncho Gomez MD        Or    ondansetron [facility]) injection 4 mg  4 mg IntraVENous Q6H PRN Moncho Gomez MD        famotidine (PEPCID) tablet 20 mg  20 mg Oral BID PRN Moncho Gomez MD        alum-mag [facility]) oral suspension 15 mL  15 mL Oral Q6H PRN Moncho Gomez MD        HYDROcodone-acetaminophen [facility]) 5-325 mg per tablet 1 Tablet  1 Tablet Oral Q4H PRN Moncho Gomez MD   1 Tablet at 10/06/21 2017    enoxaparin (LOVENOX) injection 40 mg  40 mg SubCUTAneous Q24H Moncho Gomez MD   40 mg at 10/06/21 2017       OBJECTIVE:  Patient Vitals for the past 8 hrs:   BP Temp Pulse Resp SpO2   10/07/21 1548 132/86 97.3 °F (36.3 °C) (!) 118 18 100 %   10/07/21 1158 (!) 105/57 98.8 °F (37.1 °C) (!) 103 18 100 %     Temp (24hrs), Av.9 °F (36.6 °C), Min:97.3 °F (36.3 °C), Max:98.8 °F (37.1 °C)    10/07 07 - 10/07 1900  In: 480 [P.O.:480]  Out: 500 [Urine:500]    Physical Exam:  Constitutional: Weak appearing male in no acute distress, sitting comfortably in the hospital bed. HEENT: Normocephalic and atraumatic. Oropharynx is clear, mucous membranes are moist.  Pupils are equal, round, and reactive to light. Extraocular muscles are intact. Sclerae anicteric. Neck supple without JVD. No thyromegaly present. Lymph node   No palpable submandibular, cervical, supraclavicular, axillary or inguinal lymph nodes. Skin Warm and dry. No bruising and no rash noted. No erythema. No pallor. Respiratory Lungs w decreased BS RT side, without wheezes, rales or rhonchi, normal air exchange without accessory muscle use.     CVS Normal rate, regular rhythm and normal S1 and S2. No murmurs, gallops, or rubs. Abdomen Soft, nontender and nondistended, normoactive bowel sounds. No palpable mass. No hepatosplenomegaly. Neuro Grossly nonfocal with no obvious sensory or motor deficits. MSK Normal range of motion in general.  No edema and no tenderness. Psych Appropriate mood and affect. Labs:    Recent Results (from the past 24 hour(s))   METABOLIC PANEL, BASIC    Collection Time: 10/07/21  2:25 AM   Result Value Ref Range    Sodium 134 (L) 136 - 145 mmol/L    Potassium 3.8 3.5 - 5.1 mmol/L    Chloride 100 98 - 107 mmol/L    CO2 28 21 - 32 mmol/L    Anion gap 6 (L) 7 - 16 mmol/L    Glucose 82 65 - 100 mg/dL    BUN 14 8 - 23 MG/DL    Creatinine 0.51 (L) 0.8 - 1.5 MG/DL    GFR est AA >60 >60 ml/min/1.73m2    GFR est non-AA >60 >60 ml/min/1.73m2    Calcium 9.2 8.3 - 10.4 MG/DL   CBC WITH AUTOMATED DIFF    Collection Time: 10/07/21  2:25 AM   Result Value Ref Range    WBC 4.0 (L) 4.3 - 11.1 K/uL    RBC 3.68 (L) 4.23 - 5.6 M/uL    HGB 9.0 (L) 13.6 - 17.2 g/dL    HCT 28.4 (L) 41.1 - 50.3 %    MCV 77.2 (L) 79.6 - 97.8 FL    MCH 24.5 (L) 26.1 - 32.9 PG    MCHC 31.7 31.4 - 35.0 g/dL    RDW 16.4 (H) 11.9 - 14.6 %    PLATELET 626 770 - 438 K/uL    MPV 9.5 9.4 - 12.3 FL    ABSOLUTE NRBC 0.00 0.0 - 0.2 K/uL    DF AUTOMATED      NEUTROPHILS 63 43 - 78 %    LYMPHOCYTES 24 13 - 44 %    MONOCYTES 3 (L) 4.0 - 12.0 %    EOSINOPHILS 4 0.5 - 7.8 %    BASOPHILS 2 0.0 - 2.0 %    IMMATURE GRANULOCYTES 5 0.0 - 5.0 %    ABS. NEUTROPHILS 2.5 1.7 - 8.2 K/UL    ABS. LYMPHOCYTES 1.0 0.5 - 4.6 K/UL    ABS. MONOCYTES 0.1 0.1 - 1.3 K/UL    ABS. EOSINOPHILS 0.2 0.0 - 0.8 K/UL    ABS. BASOPHILS 0.1 0.0 - 0.2 K/UL    ABS. IMM.  GRANS. 0.2 0.0 - 0.5 K/UL       Imaging:        ASSESSMENT & PLAN:  Problem List  Date Reviewed: 9/29/2021        Codes Class Noted    * (Principal) Recurrent right pleural effusion ICD-10-CM: J90  ICD-9-CM: 511.9  10/6/2021        Severe protein-calorie malnutrition ([facility] (Chronic) ICD-10-CM: E43  ICD-9-CM: 262  10/1/2021        Listed for admission to hospital ICD-10-CM: Z75.1  ICD-9-CM: V63.2  9/29/2021        Dehydration ICD-10-CM: E86.0  ICD-9-CM: 276.51  9/23/2021        Nausea & vomiting ICD-10-CM: R11.2  ICD-9-CM: 787.01  9/23/2021        Nausea ICD-10-CM: R11.0  ICD-9-CM: 787.02  9/23/2021        Hypokalemia ICD-10-CM: E87.6  ICD-9-CM: 276.8  9/23/2021        Small cell lung carcinoma, right (HCC) (Chronic) ICD-10-CM: C34.91  ICD-9-CM: 162.9  9/23/2021        Mediastinal mass ICD-10-CM: J98.59  ICD-9-CM: 786.6  8/16/2021        Shortness of breath ICD-10-CM: R06.02  ICD-9-CM: 786.05  8/16/2021        Axillary adenopathy ICD-10-CM: R59.0  ICD-9-CM: 785.6  8/16/2021        Adrenal nodule ([facility] ICD-10-CM: E27.8  ICD-9-CM: 255.8  8/16/2021            73 -American male smoker, 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 He had undergone right-sided thoracentesis 8/16/2021 with 1600 mL removed, cytology negative. He has received a single fraction palliative XRT to rib metastasis, and more recently cycle 1 carboplatin/etoposide as an inpatient few days ago. He is now readmitted through ED with worsening shortness of breath with x-ray showing large right sided pleural effusion. Pulmonology and oncology were consulted. Patient only recently received cycle 1 of systemic chemotherapy, and should clinically improve over time as his disease hopefully response to therapy. In the interim continue with supportive care as needed.   Pulmonology has already been consulted, there is concern of patient having trapped lung however will need to see if patient clinically improves after another thoracentesis. One would hope that with treatment of his underlying malignancy, his effusions will improve over time. Counts adequate for now including ANC. Brain MRI as an outpatient pending. Patient should follow-up with outpatient oncology within a week of discharge. Lab studies and imaging studies were personally reviewed. Pertinent old records were reviewed. Thank you for allowing us to participate in the care of Mr. Jovani Key.               Oseas Cannon MD  [address] [place] [address] Hematology Oncology  [address] [place] [address]  Office : [phone]  Fax : [phone]

## 2021-10-07 NOTE — H&P (VIEW-ONLY)
History and Physical Initial Visit NOTE           10/7/2021    Kenzie Plate                        Date of Admission:  10/6/2021    The patient's chart is reviewed and the patient is discussed with the staff. Subjective:     Patient is a 73 y.o.  male seen and evaluated at the request of Dr. Joaquina Scott. She has medical history of SCLC on R side with recurrent R pleural effusion who presented with SOB.  Onset a few days ago, constant, progressively worsening to point where currently he cannot make it all the way up his steps.  A/w fatigue, nausea, vomiting.  No fevers, CP, abd pain, urinary symptoms. She had a thora done 8/16 1.6L removed with Dr. Drena Severe. States his breathing improved for a short period, but has continued to worsen since. He is on chemoXRT with some improvement in left rib lesion. Review of Systems  A comprehensive review of systems was negative except for that written in the HPI. Prior to Admission Medications   Prescriptions Last Dose Informant Patient Reported? Taking? Advair Diskus 250-50 mcg/dose diskus inhaler Unknown at Unknown time  Yes No   HYDROcodone-acetaminophen (Norco)  mg tablet 10/6/2021 at Unknown time  No Yes   Sig: Take 1 Tablet by mouth every four (4) hours for 30 days. Max Daily Amount: 6 Tablets. albuterol (PROVENTIL HFA, VENTOLIN HFA, PROAIR HFA) 90 mcg/actuation inhaler Unknown at Unknown time  Yes No   amLODIPine (NORVASC) 5 mg tablet 10/5/2021 at Unknown time  No Yes   Sig: Take 1 Tablet by mouth daily. aspirin delayed-release 81 mg tablet 10/5/2021 at Unknown time  Yes Yes   Sig: Take 81 mg by mouth daily. EC   fluticasone propionate (FLONASE) 50 mcg/actuation nasal spray 10/5/2021 at Unknown time  Yes Yes   furosemide (LASIX) 20 mg tablet 10/5/2021 at Unknown time  Yes Yes   Sig: Take 20 mg by mouth daily.    gabapentin (NEURONTIN) 300 mg capsule 10/6/2021 at Unknown time  Yes Yes   Sig: Take 300 mg by mouth three (3) times daily. lidocaine-prilocaine (EMLA) topical cream Unknown at Unknown time  No No   Sig: Apply  to affected area as needed for Pain. Patient not taking: Reported on 9/30/2021   magnesium oxide (MAG-OX) 400 mg tablet 10/5/2021 at Unknown time  No Yes   Sig: Take 1 Tablet by mouth four (4) times daily. mirtazapine (REMERON) 15 mg tablet 10/5/2021 at Unknown time  No Yes   Sig: Take 1 Tablet by mouth nightly. morphine CR (MS CONTIN) 15 mg CR tablet 10/6/2021 at Unknown time  No Yes   Sig: Take 1 Tablet by mouth every twelve (12) hours for 30 days. Max Daily Amount: 30 mg.   ondansetron hcl (ZOFRAN) 4 mg tablet 9/6/2021 at Unknown time  Yes Yes   Sig: Take 4 mg by mouth every eight (8) hours as needed for Nausea or Vomiting. pantoprazole (PROTONIX) 40 mg tablet 10/5/2021 at Unknown time  Yes Yes   Sig: Take 40 mg by mouth daily. potassium chloride SR (Klor-Con 10) 10 mEq tablet 10/5/2021 at Unknown time  Yes Yes   Sig: Take 10 mEq by mouth daily. prochlorperazine (COMPAZINE) 10 mg tablet 10/6/2021 at Unknown time  Yes Yes   Sig: Take 10 mg by mouth every six (6) hours as needed for Nausea or Vomiting.       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, blind in left eye,  deaf in left ear,\"every bone in skull was broken\"     Past Surgical History:   Procedure Laterality Date    HX APPENDECTOMY      HX HEENT  9/2009    facial reconstruction post trauma     Social History     Socioeconomic History    Marital status:      Spouse name: Not on file    Number of children: Not on file    Years of education: Not on file    Highest education level: Not on file   Occupational History    Not on file   Tobacco Use    Smoking status: Current Every Day Smoker     Packs/day: 0.50     Years: 30.00     Pack years: 15.00    Smokeless tobacco: Never Used   Substance and Sexual Activity    Alcohol use: Not Currently     Alcohol/week: 0.8 standard drinks     Types: 1 Cans of beer per week    Drug use: No    Sexual activity: Not on file   Other Topics Concern     Service Not Asked    Blood Transfusions Not Asked    Caffeine Concern Not Asked    Occupational Exposure Not Asked    Hobby Hazards Not Asked    Sleep Concern Not Asked    Stress Concern Not Asked    Weight Concern Not Asked    Special Diet Not Asked    Back Care Not Asked    Exercise Not Asked    Bike Helmet Not Asked   [address] Not Asked    Self-Exams Not Asked   Social History Narrative    Not on file     Social Determinants of Health     Financial Resource Strain:     Difficulty of Paying Living Expenses:    Food Insecurity:     Worried About Running Out of Food in the Last Year:     Ran Out of Food in the Last Year:    Transportation Needs:     Lack of Transportation (Medical):      Lack of Transportation (Non-Medical):    Physical Activity:     Days of Exercise per Week:     Minutes of Exercise per Session:    Stress:     Feeling of Stress :    Social Connections:     Frequency of Communication with Friends and Family:     Frequency of Social Gatherings with Friends and Family:     Attends [employer] Services:     Active Member of Clubs or Organizations:     Attends Club or Organization Meetings:     Marital Status:    Intimate Partner Violence:     Fear of Current or Ex-Partner:     Emotionally Abused:     Physically Abused:     Sexually Abused:      Family History   Problem Relation Age of Onset    Diabetes Maternal Aunt     Diabetes Paternal Aunt      No Known Allergies  Current Facility-Administered Medications   Medication Dose Route Frequency    budesonide-formoteroL (SYMBICORT) 160-4.5 mcg/actuation HFA inhaler 2 Puff  2 Puff Inhalation BID RT    [Held by provider] amLODIPine (NORVASC) tablet 5 mg  5 mg Oral DAILY    aspirin delayed-release tablet 81 mg  81 mg Oral DAILY    furosemide (LASIX) tablet 20 mg  20 mg Oral DAILY    gabapentin (NEURONTIN) capsule 300 mg  300 mg Oral TID    HYDROcodone-acetaminophen (NORCO)  mg tablet 1 Tablet  1 Tablet Oral Q4H PRN    magnesium oxide (MAG-OX) tablet 400 mg  400 mg Oral BID    mirtazapine (REMERON) tablet 15 mg  15 mg Oral QHS    morphine CR (MS CONTIN) tablet 15 mg  15 mg Oral Q12H    pantoprazole (PROTONIX) tablet 40 mg  40 mg Oral DAILY    potassium chloride (KLOR-CON) tablet 10 mEq  10 mEq Oral DAILY    promethazine (PHENERGAN) with saline injection 25 mg  25 mg IntraVENous Q6H PRN    sodium chloride (NS) flush 5-40 mL  5-40 mL IntraVENous Q8H    sodium chloride (NS) flush 5-40 mL  5-40 mL IntraVENous PRN    acetaminophen (TYLENOL) tablet 650 mg  650 mg Oral Q6H PRN    Or    acetaminophen (TYLENOL) suppository 650 mg  650 mg Rectal Q6H PRN    polyethylene glycol (MIRALAX) packet 17 g  17 g Oral DAILY PRN    bisacodyL (DULCOLAX) suppository 10 mg  10 mg Rectal DAILY PRN    ondansetron (ZOFRAN ODT) tablet 4 mg  4 mg Oral Q8H PRN    Or    ondansetron (ZOFRAN) injection 4 mg  4 mg IntraVENous Q6H PRN    famotidine (PEPCID) tablet 20 mg  20 mg Oral BID PRN    alum-mag hydroxide-simeth (MYLANTA) oral suspension 15 mL  15 mL Oral Q6H PRN    HYDROcodone-acetaminophen (NORCO) 5-325 mg per tablet 1 Tablet  1 Tablet Oral Q4H PRN    enoxaparin (LOVENOX) injection 40 mg  40 mg SubCUTAneous Q24H     Objective:   Blood pressure (!) 105/57, pulse (!) 103, temperature 98.8 °F (37.1 °C), resp. rate 18, height 5' 11\" (1.803 m), weight 166 lb (75.3 kg), SpO2 100 %.      Intake/Output Summary (Last 24 hours) at 10/7/2021 1522  Last data filed at 10/7/2021 1334  Gross per 24 hour   Intake 1748 ml   Output 300 ml   Net 1448 ml     PHYSICAL EXAM   Constitutional:  the patient is well developed and in no acute distress  EENMT:  Sclera clear, pupils equal, oral mucosa moist  Respiratory: diminished on right  Cardiovascular:  RRR without M,G,R  Gastrointestinal: soft and non-tender; with positive bowel sounds. Musculoskeletal: warm without cyanosis. There is no lower extremity edema. Skin:  no jaundice or rashes, no wounds   Neurologic: no gross neuro deficits     Psychiatric:  alert and oriented x 4    CXR/CT chest:  Large right effusion, no real change over the past month, RLL airway appears obstructive from mass. Recent Labs     10/07/21  0225 10/06/21  1650 10/06/21  1500   WBC 4.0*  --  5.8   HGB 9.0*  --  9.5*   HCT 28.4*  --  29.7*     --  422   INR  --   --  1.1   LAC  --   --  1.2   * 134*  --    K 3.8 4.0  --     100  --    CO2 28 26  --    GLU 82 92  --    BUN 14 15  --    CREA 0.51* 0.48*  --    CA 9.2 9.3  --    ALB  --  2.3*  --    AST  --  39*  --    ALT  --  13  --    AP  --  54  --    TROPHS  --  38.7* 38.3*   BNPNT  --   --  229*     ECHO: No results found for this or any previous visit. Results     Procedure Component Value Units Date/Time    COVID-19 RAPID TEST [[id number]] Collected: 10/06/21 1519    Order Status: Completed Specimen: Nasopharyngeal Updated: 10/06/21 1547     Specimen source Nasopharyngeal        COVID-19 rapid test Not detected        Comment:      The specimen is NEGATIVE for SARS-CoV-2, the novel coronavirus associated with COVID-19. A negative result does not rule out COVID-19. This test has been authorized by the FDA under an Emergency Use Authorization (EUA) for use by authorized laboratories.         Fact sheet for Healthcare Providers: [employer]  Fact sheet for Patients: [employer]       Methodology: Isothermal Nucleic Acid Amplification         COVID-19 RAPID TEST [[id number]] Collected: 09/29/21 1230    Order Status: Completed Specimen: Nasopharyngeal Updated: 09/29/21 1341     Specimen source Nasopharyngeal        COVID-19 rapid test Not detected        Comment:      The specimen is NEGATIVE for SARS-CoV-2, the novel coronavirus associated with COVID-19. A negative result does not rule out COVID-19. This test has been authorized by the FDA under an Emergency Use Authorization (EUA) for use by authorized laboratories. Fact sheet for Healthcare Providers: [employer]  Fact sheet for Patients: [employer]       Methodology: Isothermal Nucleic Acid Amplification         SARS-COV-2, PCR [[id number]] Collected: 09/29/21 1230    Order Status: Completed Specimen: Nasopharyngeal Updated: 09/30/21 0830     Specimen source Nasopharyngeal        SARS-CoV-2 Not detected        Comment:      The specimen is NEGATIVE for SARS-CoV-2, the novel coronavirus associated with COVID-19. This test has been authorized by the FDA under an Emergency Use Authorization (EUA) for use by authorized laboratories. Fact sheet for Healthcare Providers: [employer]       Fact sheet for Patients: [employer]       Methodology: RT-PCR             Inpat Anti-Infectives (From admission, onward)    None        Assessment and Plan:  (Medical Decision Making)   Principal Problem:    Recurrent right pleural effusion (10/6/2021)    I think this is likely trapped due to encompassing cancer of R lung as well as obstructing BI with mass. I am doubtful he will improve with thora or PleurX, but will try to do thora to see if he symptomatically improves. If it does not help him he likely would not benefit from PleurX and would remain an infectious risk without benefit. Possible he could have other options if he responds more to therapy in the future.      Active Problems:    Small cell lung carcinoma, right ([facility] [address] (9/23/2021)    Per oncology      Severe protein-calorie malnutrition ([facility] [address] (10/1/2021)    Weight loss. Full Code    More than 50% of the time documented was spent in face-to-face contact with the patient and in the care of the patient on the floor/unit where the patient is located. Thank you very much for this referral.  We appreciate the opportunity to participate in this patient's care. Will follow along with above stated plan.     Ruth Desai MD

## 2021-10-07 NOTE — PROGRESS NOTES
Care Management Interventions  Mode of Transport at Discharge: Self  Transition of Care Consult (CM Consult): Discharge Planning  Support Systems:  (lives alone)  Confirm Follow Up Transport: Self  The Patient and/or Patient Representative was Provided with a Choice of Provider and Agrees with the Discharge Plan?: Yes  Freedom of Choice List was Provided with Basic Dialogue that Supports the Patient's Individualized Plan of Care/Goals, Treatment Preferences and Shares the Quality Data Associated with the Providers?: Yes  Discharge Location  Discharge Placement: Home with home health    Sw reviewed chart and talked with pt. Pt is a 73 yr old AA male adm yesterday due to chest pain/SOB. Pt informed he has lung cancer and the mass in his chest makes it difficult to breathe. Pt lives alone in an apt. He does not receive Meals On Wheels. He receives disability which covers his rent and food. Pt's wife  about 4 yrs ago so he is on his own. His sister lives in [place] and his mother lives on the other side [facility]. Pt informed he has a procedure scheduled which will drain the fluid and make him breathe better. Pt was just discharged from the hospital on 10/2. He is current with [address] [place] [address] for PT only. This Sw believes he needs an RN to follow him at discharge as he has advanced cancer with fluid building up. Pt may receive a pleurex drain as an outpt. Yara will cont to follow and assist. This pt is high risk as he has very little support in the community and has advanced cancer.  Renny Nogueira

## 2021-10-08 NOTE — DISCHARGE SUMMARY
Hospitalist Discharge Summary   Admit Date:  10/6/2021  2:29 PM   DC Note date: 10/8/2021  Name:  Sasha Frias   Age:  73 y.o. Sex:  male  :  1955   MRN:  [id number]   Room:  [facility]  PCP:  Allison Chavez MD    Presenting Complaint: Shortness of Breath    Initial Admission Diagnosis: Recurrent right pleural effusion [J90]     Problem List for this Hospitalization:  Hospital Problems as of 10/8/2021 Date Reviewed: 2021        Codes Class Noted - Resolved POA    * (Principal) Recurrent right pleural effusion ICD-10-CM: J90  ICD-9-CM: 511.9  10/6/2021 - Present Yes        Severe protein-calorie malnutrition ([facility] [address] (Chronic) ICD-10-CM: Y14  ICD-9-CM: 262  10/1/2021 - Present Yes        Small cell lung carcinoma, right ([address] (Chronic) ICD-10-CM: C34.91  ICD-9-CM: 162.9  2021 - Present Yes            Did Patient have Sepsis (YES OR NO): no    Admission HPI from 10/6/2021:    \"John Leo is a 73 y.o. male with medical history of SCLC on R side with recurrent R pleural effusion who presented with SOB. Onset a few days ago, constant, progressively worsening to point where currently he cannot make it all the way up his steps. A/w fatigue, nausea, vomiting. No fevers, CP, abd pain, urinary symptoms\"    Hospital Course:  Sasha Frias is a 73 y.o. male with medical history of SCLC on R side with recurrent R pleural effusion who presented with SOB. Onset a few days ago, constant, progressively worsening to point where currently he cannot make it all the way up his steps. A/w fatigue, nausea, vomiting. No fevers, CP, abd pain, urinary symptoms    Admitted with symptomatic pleural effusion. Symptoms improved after thoracentesis. Initially Pulm was not sure if symptoms were related more to his cancer of R lung as well as obstructing BI with mass\"; see their note 10-7. However given improvement in symptoms with thora, felt that pt would benefit from PleurX.   Oncology ordered IR placement but Pulmonary could do as well depending on availability. He needs follow up with Dr Mark Causey in 1 week. Will continue outpatient lasix for now but I would stop this after he gets his PleurX    Metop added for borderline tachycardia and norvasc stopped; he is likely losing weight from cancer and may not need antihypertensives anymore. Disposition: Home or Self Care  Diet: ADULT DIET Regular  ADULT ORAL NUTRITION SUPPLEMENT AM Snack, PM Snack, Lunch, Breakfast, Dinner; Standard High Calorie/High Protein  Code Status: Full Code    Follow Up Orders: Follow-up Appointments   Procedures    FOLLOW UP VISIT Appointment in: One Week Please schedule follow up appt with Dr. Mark Causey or NP in one week     Please schedule follow up appt with Dr. Mark Causey or NP in one week     Standing Status:   Standing     Number of Occurrences:   1     Order Specific Question:   Appointment in     Answer: One Week       Follow-up Information     Follow up With Specialties Details Why Contact Info Additional Information    Contreras Proctor MD Hematology, Oncology In 1 week follow up [address] [facility] [address]       Rosa Florence MD Internal Medicine Call As needed [address] [place] [address] Rd  [phone]       ST. [address] [place] [address] Radiology  pleurX drain has been ordered. they should call you to schedule. please call them if you do not hear from them by end of Monday [phone]  [phone] [facility], [facility]- 2nd floor of Outpatient Medical Office Building          Follow up labs/diagnostics (ultimately defer to outpatient provider):  CBC, CMP, Mg    Time spent in patient discharge and coordination 32 minutes. Plan was discussed with pt. All questions answered. Patient was stable at time of discharge. Given instructions to call a physician or return if any concerns.     Discharge Info: Current Discharge Medication List      START taking these medications    Details   metoprolol tartrate (LOPRESSOR) 25 mg tablet Take 0.5 Tablets by mouth two (2) times a day. Qty: 30 Tablet, Refills: 1  Start date: 10/8/2021         CONTINUE these medications which have NOT CHANGED    Details   prochlorperazine (COMPAZINE) 10 mg tablet Take 10 mg by mouth every six (6) hours as needed for Nausea or Vomiting.      magnesium oxide (MAG-OX) 400 mg tablet Take 1 Tablet by mouth four (4) times daily. Qty: 120 Tablet, Refills: 0      fluticasone propionate (FLONASE) 50 mcg/actuation nasal spray       morphine CR (MS CONTIN) 15 mg CR tablet Take 1 Tablet by mouth every twelve (12) hours for 30 days. Max Daily Amount: 30 mg.  Qty: 60 Tablet, Refills: 0    Associated Diagnoses: Cancer related pain; Encounter for palliative care      HYDROcodone-acetaminophen (Norco)  mg tablet Take 1 Tablet by mouth every four (4) hours for 30 days. Max Daily Amount: 6 Tablets. Qty: 180 Tablet, Refills: 0    Associated Diagnoses: Cancer related pain; Encounter for palliative care      mirtazapine (REMERON) 15 mg tablet Take 1 Tablet by mouth nightly. Qty: 30 Tablet, Refills: 2      pantoprazole (PROTONIX) 40 mg tablet Take 40 mg by mouth daily. aspirin delayed-release 81 mg tablet Take 81 mg by mouth daily. EC      furosemide (LASIX) 20 mg tablet Take 20 mg by mouth daily. potassium chloride SR (Klor-Con 10) 10 mEq tablet Take 10 mEq by mouth daily. ondansetron hcl (ZOFRAN) 4 mg tablet Take 4 mg by mouth every eight (8) hours as needed for Nausea or Vomiting.      gabapentin (NEURONTIN) 300 mg capsule Take 300 mg by mouth three (3) times daily.       albuterol (PROVENTIL HFA, VENTOLIN HFA, PROAIR HFA) 90 mcg/actuation inhaler       Advair Diskus 250-50 mcg/dose diskus inhaler          STOP taking these medications       amLODIPine (NORVASC) 5 mg tablet Comments:   Reason for Stopping:         lidocaine-prilocaine (EMLA) topical cream Comments:   Reason for Stopping:               Procedures done this admission:  * No surgery found *    Consults this admission:  IP CONSULT TO ONCOLOGY  IP CONSULT TO PULMONOLOGY    Echocardiogram/EKG results:  No results found for this or any previous visit. EKG Results     Procedure 720 Value Units Date/Time    EKG, 12 LEAD, INITIAL [[id number]] Collected: 10/06/21 1448    Order Status: Completed Updated: 10/06/21 1633     Ventricular Rate 111 BPM      Atrial Rate 111 BPM      P-R Interval 124 ms      QRS Duration 82 ms      Q-T Interval 310 ms      QTC Calculation (Bezet) 421 ms      Calculated P Axis 68 degrees      Calculated R Axis 87 degrees      Calculated T Axis 66 degrees      Diagnosis --     Sinus tachycardia  Otherwise normal ECG  When compared with ECG of 06-OCT-2021 07:56,  Criteria for Septal infarct are no longer Present  Confirmed by Chuy Spann MD (), Genaro Bass ([id number]) on 10/6/2021 4:33:26 PM            Diagnostic Imaging/Tests:   XR CHEST PA LAT    Result Date: 10/7/2021  XR CHEST PA LAT 10/7/2021 4:42 PM HISTORY: Follow-up effusion following right thoracentesis. COMPARISON: Chest x-ray 10/6/2021 at 1449 hours. 2 views of the chest are performed. Left lung is well expanded and clear. No pneumothorax or left pleural effusion. Previously noted large right pleural effusion is now moderate in size following thoracentesis. Heart size appears within normal limits. XR CHEST PA LAT    Result Date: 9/10/2021  Chest X-ray INDICATION: Shortness of breath, history of pleural effusion PA and lateral views of the chest were obtained. FINDINGS: There is a persistent large right pleural effusion. Effusion is slightly larger than on the prior chest x-ray from 08/05/2021. Left lung remains relatively clear. .  The heart size is normal.  The bony thorax is intact. Increased, large right pleural effusion.      XR CHEST PA LAT    Result Date: 8/5/2021  TWO-VIEW CHEST: CLINICAL HISTORY: CHEST pain, shortness of breath, and weight loss a 73year-old with a 30-year smoking history. COMPARISON:  None. FINDINGS: PA and lateral chest images demonstrate a large right pleural effusion with basilar consolidation/collapse as well as mass effect at the superior aspect of the right hilum. This constellation of findings must be considered suspicious for an obstructive central bronchogenic carcinoma until proven otherwise. The left lung appears clear. The heart size is within normal limits without evidence of congestive heart failure or pneumothorax. The bony thorax appears intact on these views. LARGE RIGHT PLEURAL EFFUSION WITH BASILAR CONSOLIDATION/COLLAPSE AND MASS EFFECT IN THE SUPERIOR ASPECT OF THE RIGHT HILUM IS NONSPECIFIC BUT SUSPICIOUS FOR AN OBSTRUCTIVE CENTRAL BRONCHOGENIC CARCINOMA IN THIS PATIENT WITH A SIGNIFICANT SMOKING HISTORY. FOLLOW-UP CHEST CT WITH CONTRAST IS RECOMMENDED FOR FURTHER EVALUATION AT THIS TIME. 689 The significant findings in this report have been referred to the Imaging Navigator in order to communicate to the referring provider or his/her designee as outlined in Section II.C.2.a.ii-iii of the ACR Practice Guideline for Communication of Diagnostic Imaging Findings. CT CHEST WO CONT    Result Date: 8/16/2021  CT of the chest without contrast. CLINICAL INDICATION: Status post thoracentesis on the right, known mediastinal mass PROCEDURE: Serial thin section axial images obtained from the thoracic inlet through the upper abdomen without the administration of intravenous contrast. Radiation dose reduction techniques were used for this study.  Our CT scanners use one or all of the following: Automated exposure control, adjusted of the mA and/or kV according to patient size, iterative reconstruction COMPARISON: Chest CT dated 8/12/2021 FINDINGS: There is a very large mass again evident that surrounds the jojo and extends throughout the right shireen that is unchanged from the prior examination. This is better characterized on the prior examination given the presence of intravenous contrast. Shotty pretracheal and prevascular mediastinal lymph nodes are present. There is a large left axillary lymph node that is unchanged and likely represents metastatic disease. The right-sided pleural effusion has decreased minimally in size. There is no pneumothorax. There is no change in the relatively minimal aeration of the right lung. Limited evaluation of the upper abdomen shows a stable left adrenal nodule likely representing metastatic disease at measures 2.4 cm. There is a presumably hemorrhagic cyst off the posterior aspect of the left kidney. This is unchanged. No aggressive bone lesions identified. 1. Slight decrease in size of the right pleural effusion after thoracentesis. There is no pneumothorax. There is relatively minimal aeration of the right lung which is unchanged. 2. Stable very large multi compartment mediastinal and right hilar mass grossly unchanged from the prior exam with enlarged left axillary lymphadenopathy. 3. Stable enlarged 2.4 cm left adrenal nodule. 4. Hemorrhagic cyst off the right kidney. CT CHEST W CONT    Result Date: 8/12/2021  CT chest with contrast History: Pt states shob and lung mass (area marked) since second covid vaccine approx 90 days ago Technique: Helically acquired images were obtained from the lung apices to the domes of the diaphragms reconstructed at 5 mm thickness after the uneventful administration of 80 mL of intravenous Optiray-350 in order to better evaluate the mediastinal structures. Coronal reformatted images were submitted. Radiation dose reduction techniques were used for this study:  Our CT scanners use one or all of the following: Automated exposure control, adjustment of the mA and/or kVp according to patient's size, iterative reconstruction. Comparison: None. Correlation is made to the chest x-ray 08/05/2021. Findings: There is a large right pleural effusion. There is no pericardial effusion. The heart is normal in size. There is abrupt occlusion of the distal right mainstem bronchus. There is dense opacity within the subcarinal and right hilar region which is contiguous with complete atelectasis of the right middle and lower lobes. An underlying mass estimated to measure approximately 10.1 x 10.1 x 9.5 cm is present within the subcarinal and right hilar region. There is circumferential narrowing of the right pulmonary artery. There is mass effect upon the left atrium. There is an enlarged left axillary lymph node measuring 3.1 cm. A left adrenal gland nodule measures 2.2 cm. There is a partial imaged low-density lesion within the retroperitoneum on the right. 1. Large mediastinal mass with occlusion of the right mainstem bronchus most concerning for malignancy. 2. Enlarged left axillary lymph node and left adrenal gland nodule, most concerning for metastatic disease. 3. Large right pleural effusion. The study was placed into the imaging Navigator folder to contact the ordering provider's office with results. US GUIDE BX LYMPH NOD SUPER    Result Date: 9/7/2021  History: Lung lesions. Other PET avid lesions including a left axillary mass Consent: Informed written and oral consent was obtained from the patient after explanation of benefits and risks (including, but not limited to: Infection, Hemorrhage, and Visceral injury) of procedure. The patient's questions were answered and requested that we proceed. Procedure:  Sterile barrier technique (including:  cap, mask, sterile gloves, sterile sheet, hand hygiene, and chlorhexidene for cutaneous antisepsis) were used. Following routine prep and drape of the upper abdomen, a local field block with 1% lidocaine was achieved. Using real-time ultrasound guidance, a 17 gauge needle was advanced into the superficial aspect of the left axillary mass.   Through this base needle, 3 18 gauge core biopsies were performed. The postprocedure ultrasound demonstrates no evidence of subcapsular hematoma. Complications: None. Medications: None     Technically successful ultrasound guided left axillary mass biopsy. Specimen: Sent to cytopathology. XR CHEST PORT    Result Date: 10/6/2021  EXAM: CHEST X-RAY, 1 VIEW INDICATION: cough. COMPARISON: Chest x-ray 9/10/2021 TECHNIQUE: Single AP view of the chest was obtained. FINDINGS: Stable large right pleural effusion and dense opacification throughout the right lung consistent with the known mediastinal mass. Mild opacification in the left lung base. 1.  Stable opacification throughout the right hemithorax consistent with the known mass and pleural effusion. 2.  Mild opacification in the left lung base could reflect atelectasis or new infiltrates. PET/CT TUMOR IMAGE SKULL THIGH W (INI)    Result Date: 8/26/2021  PET/CT  INDICATION: Hilar mass and pleural effusion. RADIOPHARMACEUTICAL: 11.8 mCi F18-FDG, intravenously. TECHNIQUE: Imaging was performed from the skull through the proximal thighs using routine PET/CT acquisition protocol. Imaging was performed approximately 60 minutes post injection. Oral contrast was administered. Radiation dose reduction techniques were used for this study:  Our CT scanners use one or all of the following: Automated exposure control, adjustment of the mA and/or kVp according to patient's size, iterative reconstruction. SERUM GLUCOSE: 104 mg/dL prior to injection. COMPARISON: CT chest of August 24, 2021. Aorta uptake: SUV max 2.0 Hepatic uptake: SUV max 2.2 FINDINGS: HEAD/NECK: PET Findings: Hypermetabolic 1 cm right periarticular lymph node (SUV max 6.5, image number 19). A 1.8 cm short axis hypermetabolic right supraclavicular/lower cervical lymph node (SUV max 11.4, image number 48). A 3.1 cm short axis left axillary hypermetabolic lymph node (SUV max 10.0, image number 79).  CHEST: PET Findings: Utilization of a large heterogeneous hypermetabolic right perihilar/mediastinal soft tissue mass measuring up to 12 x 18 cm in its largest dimension (SUV max 20.1 image number 115). Multiple smaller foci of perihilar uptake including, a 1.3 cm left perihilar hypermetabolic focus (SUV max 7.3, image number 94). Multiple enlarged hypermetabolic mediastinal nodes involving multiple stations including the 1.3 cm short axis upper paratracheal node (SUV max 10.4, image number 71). Persistent large pleural effusion with near complete atelectasis of the right lung. ABDOMEN/PELVIS: PET Findings: Numerous hypermetabolic retroperitoneal lymph nodes with index lesions including: *  A 1.8 cm short axis left para-aortic lymph node (SUV max 9.4, image number 147). *  A 1.8 cm short axis para-aortic lymph node (SUV max 11.0, image number 193). *  A 2.6 x 3.8 cm left pelvic sidewall alena conglomerate (SUV max 12.0, image number 228). There is a 1.6 cm hypermetabolic right pararenal soft tissue density which appears separate from the renal cortex itself (SUV max 8.6, image number 152). Additional smaller hypermetabolic nodules within the perihepatic space measuring 1.2 cm (SUV max 7.7, image number 160) MUSCULOSKELETAL: PET Findings: FDG uptake involving multiple vertebral bodies with index lesions including: *  Hypermetabolic focus within the C7 vertebral body without CT correlate(SUV max 11.4). *  Hypermetabolic focus of the C6 vertebral body without CT correlate (SUV max 11.9, image number 86). Abnormal uptake within the right scapula and glenoid without CT correlate (SUV max 8.5, image number 57). Several foci of FDG uptake within bilateral ribs including a destructive soft tissue mass of the lateral left fourth rib (SUV max 18.3), and lateral right fourth rib without CT correlate (SUV max 7.4, image number 90).  Focus of FDG activity without CT correlate of the left iliac bone (SUV max 11.8, image number 198) and left sacrum (SUV max 10.4, image number 210)     1. Large right hypermetabolic perihilar/mediastinal soft tissue mass with large right pleural effusion and near complete atelectasis of the right lung. Additional scattered hypermetabolic axillary, mediastinal lymph nodes and left perihilar lymph nodes. 2.  Numerous enlarged hypermetabolic retroperitoneal lymph nodes has increased above. 3.  Numerous osseous FDG uptake without CT correlate including a destructive left lateral rib lesion, as above. All Micro Results     Procedure Component Value Units Date/Time    COVID-19 RAPID TEST [[id number]] Collected: 10/06/21 1519    Order Status: Completed Specimen: Nasopharyngeal Updated: 10/06/21 1541     Specimen source Nasopharyngeal        COVID-19 rapid test Not detected        Comment:      The specimen is NEGATIVE for SARS-CoV-2, the novel coronavirus associated with COVID-19. A negative result does not rule out COVID-19. This test has been authorized by the FDA under an Emergency Use Authorization (EUA) for use by authorized laboratories.         Fact sheet for Healthcare Providers: ConventionUpdate.co.nz  Fact sheet for Patients: ConventionUpdate.co.nz       Methodology: Isothermal Nucleic Acid Amplification               Labs: Results:       BMP, Mg, Phos Recent Labs     10/08/21  0535 10/07/21  0225 10/06/21  1650   * 134* 134*   K 4.3 3.8 4.0   CL 98 100 100   CO2 29 28 26   AGAP 6* 6* 8   BUN 9 14 15   CREA 0.54* 0.51* 0.48*   CA 9.6 9.2 9.3   GLU 85 82 92      CBC Recent Labs     10/08/21  0535 10/07/21  0225 10/06/21  1500   WBC 2.9* 4.0* 5.8   RBC 3.71* 3.68* 3.90*   HGB 9.0* 9.0* 9.5*   HCT 29.1* 28.4* 29.7*    300 422   GRANS 60 63 79*   LYMPH 30 24 15   EOS 3 4 2   MONOS 3* 3* 2*   BASOS 1 2 1   IG 2 5 2   ANEU 1.8 2.5 4.6   ABL 0.9 1.0 0.9   SAVANA 0.1 0.2 0.1   ABM 0.1 0.1 0.1   ABB 0.0 0.1 0.0   AIG 0.1 0.2 0.1      LFT Recent Labs     10/06/21  1650   ALT 13 AP 54   TP 7.5   ALB 2.3*   GLOB 5.2*   AGRAT 0.4*      Cardiac Testing No results found for: BNPP, BNP, CPK, RCK1, RCK2, RCK3, RCK4, CKMB, CKNDX, CKND1, TROPT, TROIQ   Coagulation Tests Lab Results   Component Value Date/Time    Prothrombin time 14.4 10/06/2021 03:00 PM    INR 1.1 10/06/2021 03:00 PM    aPTT 36.5 (H) 10/06/2021 03:00 PM      A1c No results found for: HBA1C, [id number], PWD3ABRY   Lipid Panel No results found for: CHOL, CHOLPOCT, CHOLX, CHLST, CHOLV, 985860, HDL, HDLP, LDL, LDLC, DLDLP, 752356, VLDLC, VLDL, TGLX, TRIGL, TRIGP, TGLPOCT, CHHD, CHHDX   Thyroid Panel Lab Results   Component Value Date/Time    TSH 1.030 09/29/2021 08:08 AM    TSH 0.742 09/10/2021 10:50 AM        Most Recent UA No results found for: COLOR, APPRN, REFSG, BECKY, PROTU, GLUCU, KETU, BILU, BLDU, UROU, ALESIA, LEUKU, WBCU, RBCU, UEPI, BACTU, CASTS, UCRY, MUCUS, UCOM       All Labs from Last 24 Hrs:  Recent Results (from the past 24 hour(s))   METABOLIC PANEL, BASIC    Collection Time: 10/08/21  5:35 AM   Result Value Ref Range    Sodium 133 (L) 136 - 145 mmol/L    Potassium 4.3 3.5 - 5.1 mmol/L    Chloride 98 98 - 107 mmol/L    CO2 29 21 - 32 mmol/L    Anion gap 6 (L) 7 - 16 mmol/L    Glucose 85 65 - 100 mg/dL    BUN 9 8 - 23 MG/DL    Creatinine 0.54 (L) 0.8 - 1.5 MG/DL    GFR est AA >60 >60 ml/min/1.73m2    GFR est non-AA >60 >60 ml/min/1.73m2    Calcium 9.6 8.3 - 10.4 MG/DL   CBC WITH AUTOMATED DIFF    Collection Time: 10/08/21  5:35 AM   Result Value Ref Range    WBC 2.9 (L) 4.3 - 11.1 K/uL    RBC 3.71 (L) 4.23 - 5.6 M/uL    HGB 9.0 (L) 13.6 - 17.2 g/dL    HCT 29.1 (L) 41.1 - 50.3 %    MCV 78.4 (L) 79.6 - 97.8 FL    MCH 24.3 (L) 26.1 - 32.9 PG    MCHC 30.9 (L) 31.4 - 35.0 g/dL    RDW 16.3 (H) 11.9 - 14.6 %    PLATELET 976 911 - 823 K/uL    MPV 8.7 (L) 9.4 - 12.3 FL    ABSOLUTE NRBC 0.00 0.0 - 0.2 K/uL    DF AUTOMATED      NEUTROPHILS 60 43 - 78 %    LYMPHOCYTES 30 13 - 44 %    MONOCYTES 3 (L) 4.0 - 12.0 %    EOSINOPHILS 3 0.5 - 7.8 %    BASOPHILS 1 0.0 - 2.0 %    IMMATURE GRANULOCYTES 2 0.0 - 5.0 %    ABS. NEUTROPHILS 1.8 1.7 - 8.2 K/UL    ABS. LYMPHOCYTES 0.9 0.5 - 4.6 K/UL    ABS. MONOCYTES 0.1 0.1 - 1.3 K/UL    ABS. EOSINOPHILS 0.1 0.0 - 0.8 K/UL    ABS. BASOPHILS 0.0 0.0 - 0.2 K/UL    ABS. IMM. GRANS. 0.1 0.0 - 0.5 K/UL       Recent Vital Data:  Patient Vitals for the past 24 hrs:   Temp Pulse Resp BP SpO2   10/08/21 0837 97.8 °F (36.6 °C) (!) 111 18 112/71 100 %   10/08/21 0811     97 %   10/08/21 0250 98.8 °F (37.1 °C) 100 18 117/63 98 %   10/07/21 2226 98.5 °F (36.9 °C) 98 17 102/71 98 %   10/07/21 2121     100 %   10/07/21 1940 97.5 °F (36.4 °C) 100 18 108/74 98 %   10/07/21 1548 97.3 °F (36.3 °C) (!) 118 18 132/86 100 %   10/07/21 1158 98.8 °F (37.1 °C) (!) 103 18 (!) 105/57 100 %     Oxygen Therapy  O2 Sat (%): 100 % (10/08/21 0837)  Pulse via Oximetry: 117 beats per minute (10/08/21 0811)  O2 Device: None (Room air) (10/08/21 0811)    Estimated body mass index is 23.15 kg/m² as calculated from the following:    Height as of this encounter: 5' 11\" (1.803 m). Weight as of this encounter: 75.3 kg (166 lb). Intake/Output Summary (Last 24 hours) at 10/8/2021 1039  Last data filed at 10/8/2021 0250  Gross per 24 hour   Intake 834 ml   Output 850 ml   Net -16 ml         Physical Exam:  General:    Well nourished. No overt distress  Head:  Normocephalic, atraumatic  Eyes:  Sclerae appear normal.  Pupils equally round. HENT:  Nares appear normal, no drainage. Moist mucous membranes  Neck:  No restricted ROM. Trachea midline  CV:   RRR. No m/r/g. Lungs:   CTAB. Even, unlabored  Abdomen:   Soft, nontender, nondistended  Extremities: Warm and dry. No cyanosis or clubbing. No edema. Skin:     No rashes. Normal coloration  Neuro:  CN II-XII grossly intact. Psych:  Normal mood and affect.     Current Med List in Hospital:   Current Facility-Administered Medications   Medication Dose Route Frequency    budesonide-formoteroL (SYMBICORT) 160-4.5 mcg/actuation HFA inhaler 2 Puff  2 Puff Inhalation BID RT    [Held by provider] amLODIPine (NORVASC) tablet 5 mg  5 mg Oral DAILY    aspirin delayed-release tablet 81 mg  81 mg Oral DAILY    furosemide (LASIX) tablet 20 mg  20 mg Oral DAILY    gabapentin (NEURONTIN) capsule 300 mg  300 mg Oral TID    HYDROcodone-acetaminophen (NORCO)  mg tablet 1 Tablet  1 Tablet Oral Q4H PRN    magnesium oxide (MAG-OX) tablet 400 mg  400 mg Oral BID    mirtazapine (REMERON) tablet 15 mg  15 mg Oral QHS    morphine CR (MS CONTIN) tablet 15 mg  15 mg Oral Q12H    pantoprazole (PROTONIX) tablet 40 mg  40 mg Oral DAILY    potassium chloride (KLOR-CON) tablet 10 mEq  10 mEq Oral DAILY    promethazine (PHENERGAN) with saline injection 25 mg  25 mg IntraVENous Q6H PRN    sodium chloride (NS) flush 5-40 mL  5-40 mL IntraVENous Q8H    sodium chloride (NS) flush 5-40 mL  5-40 mL IntraVENous PRN    acetaminophen (TYLENOL) tablet 650 mg  650 mg Oral Q6H PRN    Or    acetaminophen (TYLENOL) suppository 650 mg  650 mg Rectal Q6H PRN    polyethylene glycol (MIRALAX) packet 17 g  17 g Oral DAILY PRN    bisacodyL (DULCOLAX) suppository 10 mg  10 mg Rectal DAILY PRN    ondansetron (ZOFRAN ODT) tablet 4 mg  4 mg Oral Q8H PRN    Or    ondansetron (ZOFRAN) injection 4 mg  4 mg IntraVENous Q6H PRN    famotidine (PEPCID) tablet 20 mg  20 mg Oral BID PRN    alum-mag hydroxide-simeth (MYLANTA) oral suspension 15 mL  15 mL Oral Q6H PRN    HYDROcodone-acetaminophen (NORCO) 5-325 mg per tablet 1 Tablet  1 Tablet Oral Q4H PRN    enoxaparin (LOVENOX) injection 40 mg  40 mg SubCUTAneous Q24H       No Known Allergies  Immunization History   Administered Date(s) Administered    Covid-19, MODERNA, Mrna, Lnp-s, Pf, 100mcg/0.5mL 04/23/2021, 05/21/2021       Signed:  Mark Katz MD    Part of this note may have been written by using a voice dictation software.   The note has been proof read but may still contain some grammatical/other typographical errors.

## 2021-10-08 NOTE — PROGRESS NOTES
[facility] Hematology & Oncology        Inpatient Hematology / Oncology Progress Note    Reason for Consult:  Recurrent right pleural effusion [J90]  Referring Physician:  Adriana Ellis MD    24 Hour Events:  Afebrile, tachycardic at times  On RA  S/p R thora yesterday, 800mL drained and sent for studies  Discharging home today    ROS:  Constitutional: Negative for fever, chills, weakness, malaise, fatigue. CV: Negative for chest pain, palpitations, edema. Respiratory: +dyspnea (improved). Negative for cough, wheezing. GI: Negative for nausea, abdominal pain, diarrhea. 10 point review of systems is otherwise negative with the exception of the elements mentioned above in the HPI.        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, blind in left eye,  deaf in left ear,\"every bone in skull was broken\"     Past Surgical History:   Procedure Laterality Date    HX APPENDECTOMY      HX HEENT  9/2009    facial reconstruction post trauma     Family History   Problem Relation Age of Onset    Diabetes Maternal Aunt     Diabetes Paternal Aunt      Social History     Socioeconomic History    Marital status:      Spouse name: Not on file    Number of children: Not on file    Years of education: Not on file    Highest education level: Not on file   Occupational History    Not on file   Tobacco Use    Smoking status: Current Every Day Smoker     Packs/day: 0.50     Years: 30.00     Pack years: 15.00    Smokeless tobacco: Never Used   Substance and Sexual Activity    Alcohol use: Not Currently     Alcohol/week: 0.8 standard drinks     Types: 1 Cans of beer per week    Drug use: No    Sexual activity: Not on file   Other Topics Concern     Service Not Asked    Blood Transfusions Not Asked    Caffeine Concern Not Asked    Occupational Exposure Not Asked    Hobby Hazards Not Asked    Sleep Concern Not Asked    Stress Concern Not Asked    Weight Concern Not Asked    Special Diet Not Asked    Back Care Not Asked    Exercise Not Asked    Bike Helmet Not Asked   [address] Not Asked    Self-Exams Not Asked   Social History Narrative    Not on file     Social Determinants of Health     Financial Resource Strain:     Difficulty of Paying Living Expenses:    Food Insecurity:     Worried About Running Out of Food in the Last Year:     [address] in the Last Year:    Transportation Needs:     Lack of Transportation (Medical):      Lack of Transportation (Non-Medical):    Physical Activity:     Days of Exercise per Week:     Minutes of Exercise per Session:    Stress:     Feeling of Stress :    Social Connections:     Frequency of Communication with Friends and Family:     Frequency of Social Gatherings with Friends and Family:     Attends Sabianist Services:     Active Member of Clubs or Organizations:     Attends Club or Organization Meetings:     Marital Status:    Intimate Partner Violence:     Fear of Current or Ex-Partner:     Emotionally Abused:     Physically Abused:     Sexually Abused:      Current Facility-Administered Medications   Medication Dose Route Frequency Provider Last Rate Last Admin    metoprolol tartrate (LOPRESSOR) tablet 12.5 mg  12.5 mg Oral Q12H Eze Kelly MD        budesonide-formoteroL [facility]) 160-4.5 mcg/actuation HFA inhaler 2 Puff  2 Puff Inhalation BID RT Eze Kelly MD   2 Puff at 10/08/21 0810    [Held by provider] amLODIPine (NORVASC) tablet 5 mg  5 mg Oral DAILY Eze Kelly MD        aspirin delayed-release tablet 81 mg  81 mg Oral DAILY Eze Kelly MD   81 mg at 10/08/21 0901    furosemide (LASIX) tablet 20 mg  20 mg Oral DAILY Mag Villegas MD   20 mg at 10/08/21 0901    gabapentin (NEURONTIN) capsule 300 mg  300 mg Oral TID Mag Villegas MD   300 mg at 10/08/21 0901    HYDROcodone-acetaminophen (NORCO)  mg tablet 1 Tablet  1 Tablet Oral Q4H PRN Mag Villegas MD   1 Tablet at 10/07/21 1650    magnesium oxide (MAG-OX) tablet 400 mg  400 mg Oral BID Mag Villegas MD   400 mg at 10/08/21 0901    mirtazapine (REMERON) tablet 15 mg  15 mg Oral QHS Mag Villegas MD   15 mg at 10/07/21 2202    morphine CR (MS CONTIN) tablet 15 mg  15 mg Oral Q12H Mag Villegas MD   15 mg at 10/08/21 0901    pantoprazole (PROTONIX) tablet 40 mg  40 mg Oral DAILY Mag Villegas MD   40 mg at 10/08/21 0901    potassium chloride (KLOR-CON) tablet 10 mEq  10 mEq Oral DAILY Mag Villegas MD   10 mEq at 10/08/21 0901    promethazine (PHENERGAN) with saline injection 25 mg  25 mg IntraVENous Q6H PRN Mag Villegas MD        sodium chloride (NS) flush 5-40 mL  5-40 mL IntraVENous Larissa Barnes MD   10 mL at 10/08/21 0535    sodium chloride (NS) flush 5-40 mL  5-40 mL IntraVENous PRN Mag Villegas MD        acetaminophen (TYLENOL) tablet 650 mg  650 mg Oral Q6H PRN Mag Villegas MD        Or    acetaminophen (TYLENOL) suppository 650 mg  650 mg Rectal Q6H PRN Mag Villegas MD        polyethylene glycol [facility]) packet 17 g  17 g Oral DAILY PRN Mag Villegas MD        bisacodyL (DULCOLAX) suppository 10 mg  10 mg Rectal DAILY PRN Mag Villegas MD        ondansetron (ZOFRAN ODT) tablet 4 mg  4 mg Oral Q8H PRN Mag Villegas MD        Or    ondansetron [facility]) injection 4 mg  4 mg IntraVENous Q6H PRN Mag Villegas MD        famotidine (PEPCID) tablet 20 mg  20 mg Oral BID PRN Mag Villegas MD        alum-mag hydroxide-[facility]) oral suspension 15 mL  15 mL Oral Q6H PRN Mag Villegas MD  HYDROcodone-acetaminophen (NORCO) 5-325 mg per tablet 1 Tablet  1 Tablet Oral Q4H PRN Hope Souza MD   1 Tablet at 10/06/21 2017    enoxaparin (LOVENOX) injection 40 mg  40 mg SubCUTAneous Q24H Hope Souza MD   40 mg at 10/07/21 1937       OBJECTIVE:  Patient Vitals for the past 8 hrs:   BP Temp Pulse Resp SpO2   10/08/21 0837 112/71 97.8 °F (36.6 °C) (!) 111 18 100 %   10/08/21 0811     97 %     Temp (24hrs), Av °F (36.7 °C), Min:97.3 °F (36.3 °C), Max:98.8 °F (37.1 °C)      No intake/output data recorded. Physical Exam:  Constitutional: Chronically ill-appearing elderly male in no acute distress, sitting comfortably in the hospital bed. HEENT: Normocephalic and atraumatic. Oropharynx is clear, mucous membranes are moist.  Extraocular muscles are intact. Sclerae anicteric. Neck supple without JVD. No thyromegaly present. Skin Warm and dry. No bruising and no rash noted. No erythema. No pallor. Respiratory Lungs are clear bilaterally without wheezes, rales or rhonchi, normal air exchange without accessory muscle use. CVS Normal rate, regular rhythm and normal S1 and S2. No murmurs, gallops, or rubs. Abdomen Soft, nontender and nondistended, normoactive bowel sounds. No palpable mass. No hepatosplenomegaly. Neuro Grossly nonfocal with no obvious sensory or motor deficits. MSK Normal range of motion in general.  No edema and no tenderness. Psych Appropriate mood and affect.         Labs:    Recent Results (from the past 24 hour(s))   METABOLIC PANEL, BASIC    Collection Time: 10/08/21  5:35 AM   Result Value Ref Range    Sodium 133 (L) 136 - 145 mmol/L    Potassium 4.3 3.5 - 5.1 mmol/L    Chloride 98 98 - 107 mmol/L    CO2 29 21 - 32 mmol/L    Anion gap 6 (L) 7 - 16 mmol/L    Glucose 85 65 - 100 mg/dL    BUN 9 8 - 23 MG/DL    Creatinine 0.54 (L) 0.8 - 1.5 MG/DL    GFR est AA >60 >60 ml/min/1.73m2    GFR est non-AA >60 >60 ml/min/1.73m2    Calcium 9.6 8.3 - 10.4 MG/DL   CBC WITH AUTOMATED DIFF    Collection Time: 10/08/21  5:35 AM   Result Value Ref Range    WBC 2.9 (L) 4.3 - 11.1 K/uL    RBC 3.71 (L) 4.23 - 5.6 M/uL    HGB 9.0 (L) 13.6 - 17.2 g/dL    HCT 29.1 (L) 41.1 - 50.3 %    MCV 78.4 (L) 79.6 - 97.8 FL    MCH 24.3 (L) 26.1 - 32.9 PG    MCHC 30.9 (L) 31.4 - 35.0 g/dL    RDW 16.3 (H) 11.9 - 14.6 %    PLATELET 398 555 - 490 K/uL    MPV 8.7 (L) 9.4 - 12.3 FL    ABSOLUTE NRBC 0.00 0.0 - 0.2 K/uL    DF AUTOMATED      NEUTROPHILS 60 43 - 78 %    LYMPHOCYTES 30 13 - 44 %    MONOCYTES 3 (L) 4.0 - 12.0 %    EOSINOPHILS 3 0.5 - 7.8 %    BASOPHILS 1 0.0 - 2.0 %    IMMATURE GRANULOCYTES 2 0.0 - 5.0 %    ABS. NEUTROPHILS 1.8 1.7 - 8.2 K/UL    ABS. LYMPHOCYTES 0.9 0.5 - 4.6 K/UL    ABS. MONOCYTES 0.1 0.1 - 1.3 K/UL    ABS. EOSINOPHILS 0.1 0.0 - 0.8 K/UL    ABS. BASOPHILS 0.0 0.0 - 0.2 K/UL    ABS. IMM. GRANS. 0.1 0.0 - 0.5 K/UL       Imaging:  XR CHEST PA LAT [[id number]] Collected: 10/07/21 1646   Order Status: Completed Updated: 10/07/21 1650   Narrative:     XR CHEST PA LAT 10/7/2021 4:42 PM     HISTORY: Follow-up effusion following right thoracentesis. COMPARISON: Chest x-ray 10/6/2021 at 1449 hours. Impression:     2 views of the chest are performed. Left lung is well expanded and   clear. No pneumothorax or left pleural effusion. Previously noted large right   pleural effusion is now moderate in size following thoracentesis. Heart size   appears within normal limits. XR CHEST PORT [[id number]] Collected: 10/06/21 8898   Order Status: Completed Updated: 10/06/21 1505   Narrative:     EXAM: CHEST X-RAY, 1 VIEW     INDICATION: cough. COMPARISON: Chest x-ray 9/10/2021     TECHNIQUE: Single AP view of the chest was obtained. FINDINGS: Stable large right pleural effusion and dense opacification throughout   the right lung consistent with the known mediastinal mass. Mild opacification in   the left lung base.       Impression:     1.  Stable opacification throughout the right hemithorax consistent with the   known mass and pleural effusion. 2.  Mild opacification in the left lung base could reflect atelectasis or new   infiltrates. ASSESSMENT:  Problem List  Date Reviewed: 9/29/2021        Codes Class Noted    * (Principal) Recurrent right pleural effusion ICD-10-CM: J90  ICD-9-CM: 511.9  10/6/2021        Severe protein-calorie malnutrition ([facility] [address] (Chronic) ICD-10-CM: E43  ICD-9-CM: 262  10/1/2021        Listed for admission to hospital ICD-10-CM: Z75.1  ICD-9-CM: V63.2  9/29/2021        Dehydration ICD-10-CM: E86.0  ICD-9-CM: 276.51  9/23/2021        Nausea & vomiting ICD-10-CM: R11.2  ICD-9-CM: 787.01  9/23/2021        Nausea ICD-10-CM: R11.0  ICD-9-CM: 787.02  9/23/2021        Hypokalemia ICD-10-CM: E87.6  ICD-9-CM: 276.8  9/23/2021        Small cell lung carcinoma, right (HCC) (Chronic) ICD-10-CM: C34.91  ICD-9-CM: 162.9  9/23/2021        Mediastinal mass ICD-10-CM: J98.59  ICD-9-CM: 786.6  8/16/2021        Shortness of breath ICD-10-CM: R06.02  ICD-9-CM: 786.05  8/16/2021        Axillary adenopathy ICD-10-CM: R59.0  ICD-9-CM: 785.6  8/16/2021        Adrenal nodule ([facility] [address] ICD-10-CM: E27.8  ICD-9-CM: 255.8  8/16/2021              Mr. Baljinder Ang is a 73 y.o. male admitted on 10/6/2021. The primary encounter diagnosis was Pleural effusion. Diagnoses of SOB (shortness of breath), Chest pain, unspecified type, Recurrent right pleural effusion, Severe protein-calorie malnutrition ([address] and Small cell lung carcinoma, right ([facility] [address] were also pertinent to this visit. Mr Baljinder Ang has a PMH of traumatic injury to face with facial reconstruction. He is a current tobacco smoker. He is a known patient of Dr Lilian Giraldo with recent diagnosis of right hilar mass and right pleural effusion following evaluation for chest pain and shortness of breath. He underwent thoracentesis 8/16/21 with 1600 mL removed, cytology negative for malignancy.  PET scan showed hypermetabolic nodes, large mediastinal mass and diffuse bone metastases. He was referred to radiation oncology and received palliative XRT x1 fx to rib metastasis and consideration for potential XRT. He underwent biopsy of left axillary node and pathology indicated SCLC. It was planned for him to start carboplatin, etoposide, Tecentriq as an outpatient. However, he was admitted due to performance status and he completed his first cycle of treatment with carboplatin and etoposide inpatient on 10/2/21 and discharged home on 10/3/21. Mr Case Mendiola returned to ED 10/6/21 with complaints of shortness of breath. CXR showed stable large right pleural effusion and opacification consistent with known mediastinal mass. Pulmonology consulted for possible thoracentesis. COVID testing negative. We were asked for recommendations for our patient. RECOMMENDATIONS:    Metastatic small cell lung cancer (lymph nodes, bones)  - s/p C1 carboplatin/etoposide inpatient 9/30-10/2 (Tecentriq omitted due to inpatient status). C2 carbo/etop/atez due 10/21.  - s/p palliative XRT to left rib lesion  - MRI brain pending (scheduled outpatient)    Recurrent right pleural effusion  - Last thoracentesis 8/2021 - negative cytology  - Pulmonology consulted for thoracentesis  - Expect recurrence of effusion to resolve with ongoing cancer-related treatment. If they persist, consider pleurx down the line. 10/8 s/p R thora yesterday, 800mL drained and sent for studies. Pt on RA    Cancer-related pain   - Continue home pain regimen    Lab studies and imaging studies were personally reviewed. Pertinent old records were reviewed. Thank you for allowing us to participate in the care of Mr. Case Mendiola. Discharging home today. Mr. Case Mendiola will need to f/u with Dr. Lottie Barksdale within one week of discharge.           Freeman Naylor NP  [facility]emat[facility] Oncology  [address] [address]  Office : [phone]  Fax : [phone]     Attending Addendum:  I have personally performed a face to face diagnostic evaluation on this patient. I have reviewed and agree with the care plan as documented above by [employer]  My findings are as follows: Patient appears stable, heart rate regular without murmurs, abdomen is non-tender, bowel sounds are positive. 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 He had undergone right-sided thoracentesis 8/16/2021 with 1600 mL removed, cytology negative. He has received a single fraction palliative XRT to rib metastasis, and more recently cycle 1 carboplatin/etoposide as an inpatient few days ago. He is now readmitted through ED with worsening shortness of breath with x-ray showing large right sided pleural effusion. Pulmonology and oncology were consulted.     Patient only recently received cycle 1 of systemic chemotherapy, and should clinically improve over time as his disease hopefully response to therapy. In the interim continue with supportive care as needed. Pulmonology on board, pt clinically improved after repeat thoracentesis. His pleural effusions should improve over time w systemic therapy. If recurrent effusion then outpt pleurex catheter to be considered. Counts adequate for now including ANC. Patient should follow-up with outpatient oncology within a week of discharge.         Lenora Clement MD  [facility] Hematology/Oncology  [address] [place] [address]  Office : [phone]  Fax : [phone]

## 2021-10-08 NOTE — PROGRESS NOTES
Care Management Interventions  Mode of Transport at Discharge: Self  Transition of Care Consult (CM Consult): Discharge Planning  Support Systems:  (lives alone)  Confirm Follow Up Transport: Self  The Patient and/or Patient Representative was Provided with a Choice of Provider and Agrees with the Discharge Plan?: Yes  Freedom of Choice List was Provided with Basic Dialogue that Supports the Patient's Individualized Plan of Care/Goals, Treatment Preferences and Shares the Quality Data Associated with the Providers?: Yes  Discharge Location  Discharge Placement: Home with home health    DARRELL spoke w/ pt., per , pt is medically stable for d/c today. Pt is concerned because he does not have transportation to get home. DARRELL arranged transportation w/ [facility], pt will be going home from hospital. DARRELL arranged Home Health w/ ST. (Resume Order) PT and RN.      Mushtaq Wallace MSW

## 2021-10-08 NOTE — PROGRESS NOTES
Cassie Coon  Admission Date: 10/6/2021         Daily Progress Note: 10/8/2021    The patient's chart is reviewed and the patient is discussed with the staff. Background: Patient is a 73 y.o.  male seen and evaluated at the request of Dr. Christina Carbone. She has medical history of SCLC on R side with recurrent R pleural effusion who presented with SOB.  Onset a few days ago, constant, progressively worsening to point where currently he cannot make it all the way up his steps.  A/w fatigue, nausea, vomiting.  No fevers, CP, abd pain, urinary symptoms. She had a thora done 8/16 1.6L removed with Dr. Reina Bush. States his breathing improved for a short period, but has continued to worsen since. He is on chemoXRT with some improvement in left rib lesion. Subjective:     He states he initially felt worse after the thoracentesis, but after about an hour and returning from CXR felt that his breathing was better and felt much better. He was able to eat and slept well last night.      Current Facility-Administered Medications   Medication Dose Route Frequency    budesonide-formoteroL (SYMBICORT) 160-4.5 mcg/actuation HFA inhaler 2 Puff  2 Puff Inhalation BID RT    [Held by provider] amLODIPine (NORVASC) tablet 5 mg  5 mg Oral DAILY    aspirin delayed-release tablet 81 mg  81 mg Oral DAILY    furosemide (LASIX) tablet 20 mg  20 mg Oral DAILY    gabapentin (NEURONTIN) capsule 300 mg  300 mg Oral TID    HYDROcodone-acetaminophen (NORCO)  mg tablet 1 Tablet  1 Tablet Oral Q4H PRN    magnesium oxide (MAG-OX) tablet 400 mg  400 mg Oral BID    mirtazapine (REMERON) tablet 15 mg  15 mg Oral QHS    morphine CR (MS CONTIN) tablet 15 mg  15 mg Oral Q12H    pantoprazole (PROTONIX) tablet 40 mg  40 mg Oral DAILY    potassium chloride (KLOR-CON) tablet 10 mEq  10 mEq Oral DAILY    promethazine (PHENERGAN) with saline injection 25 mg  25 mg IntraVENous Q6H PRN    sodium chloride (NS) flush 5-40 mL  5-40 mL IntraVENous Q8H    sodium chloride (NS) flush 5-40 mL  5-40 mL IntraVENous PRN    acetaminophen (TYLENOL) tablet 650 mg  650 mg Oral Q6H PRN    Or    acetaminophen (TYLENOL) suppository 650 mg  650 mg Rectal Q6H PRN    polyethylene glycol (MIRALAX) packet 17 g  17 g Oral DAILY PRN    bisacodyL (DULCOLAX) suppository 10 mg  10 mg Rectal DAILY PRN    ondansetron (ZOFRAN ODT) tablet 4 mg  4 mg Oral Q8H PRN    Or    ondansetron (ZOFRAN) injection 4 mg  4 mg IntraVENous Q6H PRN    famotidine (PEPCID) tablet 20 mg  20 mg Oral BID PRN    alum-mag hydroxide-simeth (MYLANTA) oral suspension 15 mL  15 mL Oral Q6H PRN    HYDROcodone-acetaminophen (NORCO) 5-325 mg per tablet 1 Tablet  1 Tablet Oral Q4H PRN    enoxaparin (LOVENOX) injection 40 mg  40 mg SubCUTAneous Q24H     Review of Systems  Constitutional: negative for fever, chills, sweats  Cardiovascular: negative for chest pain, palpitations, syncope, edema  Gastrointestinal:  negative for dysphagia, reflux, vomiting, diarrhea, abdominal pain, or melena  Neurologic:  negative for focal weakness, numbness, headache  Objective:     Vitals:    10/07/21 2226 10/08/21 0250 10/08/21 0811 10/08/21 0837   BP: 102/71 117/63  112/71   Pulse: 98 100  (!) 111   Resp: 17 18  18   Temp: 98.5 °F (36.9 °C) 98.8 °F (37.1 °C)  97.8 °F (36.6 °C)   SpO2: 98% 98% 97% 100%   Weight:       Height:           Intake/Output Summary (Last 24 hours) at 10/8/2021 0851  Last data filed at 10/8/2021 0250  Gross per 24 hour   Intake 1074 ml   Output 850 ml   Net 224 ml     Physical Exam:   Constitution:  the patient is well developed and in no acute distress  HEENT:  Sclera clear, pupils equal, oral mucosa moist  Respiratory: diminished on right base, otherwise clear  Cardiovascular:  RRR without M,G,R  Gastrointestinal: soft and non-tender; with positive bowel sounds. Musculoskeletal: warm without cyanosis. There is no lower extremity edema.   Skin:  no jaundice or rashes, no wounds   Neurologic: no gross neuro deficits     Psychiatric:  alert and oriented x 4    CXR: 10/7: post thora; modest improvement in R effusion      LAB:  Recent Labs     10/08/21  0535 10/07/21  0225 10/06/21  1500   WBC 2.9* 4.0* 5.8   HGB 9.0* 9.0* 9.5*   HCT 29.1* 28.4* 29.7*    300 422   INR  --   --  1.1   LAC  --   --  1.2     Recent Labs     10/08/21  0535 10/07/21  0225 10/06/21  1650   * 134* 134*   K 4.3 3.8 4.0   CL 98 100 100   CO2 29 28 26   GLU 85 82 92   BUN 9 14 15   CREA 0.54* 0.51* 0.48*   CA 9.6 9.2 9.3   ALB  --   --  2.3*   AST  --   --  39*   ALT  --   --  13   AP  --   --  54     Recent Labs     10/06/21  1650 10/06/21  1500   LAC  --  1.2   TROPHS 38.7* 38.3*   BNPNT  --  229*     No results for input(s): PH, PCO2, PO2, HCO3, PHI, PCO2I, PO2I, HCO3I in the last 72 hours. No results for input(s): SDES, CULT in the last 72 hours. Assessment and Plan:  (Medical Decision Making)   Principal Problem:    Recurrent right pleural effusion (10/6/2021)    Symptomatically did improve and lung did open up some on CXR. He agrees to PleurX placement. Will try to get scheduled as outpatient in next 1-2 weeks. Active Problems:    Small cell lung carcinoma, right ([address] (9/23/2021)    Per oncology, ongoing treatment, ChemoXRT      Severe protein-calorie malnutrition ([address] (10/1/2021)    Recent weight loss. Working on diet as completes treatment. More than 50% of the time documented was spent in face-to-face contact with the patient and in the care of the patient on the floor/unit where the patient is located.     Kris Penny MD

## 2021-10-18 PROBLEM — R11.2 NAUSEA AND VOMITING: Status: ACTIVE | Noted: 2021-01-01

## 2021-10-18 PROBLEM — C79.31 BRAIN METASTASIS (HCC): Status: ACTIVE | Noted: 2021-01-01

## 2021-10-18 NOTE — H&P
[facility] History and Physical       Name:  Ashley Ramos  Age:66 y.o. Sex:male   :  1955    MRN:  [id number]   PCP:  Rhonda Sotelo MD      Admit Date:  10/18/2021  4:26 PM   Chief Complaint: \"Per EMS pt has generalized weakness for the last week, n/v/d, pt has lung cancer and is supposed to be receiving chemo, however his phone shut off and has not been able to go to his appointments. States his home health nurse has not shown up\"    Reason for Admission:   Nausea and vomiting [R11.2]    Assessment & Plan:     Principal Problem:    Small cell lung carcinoma, right ([address] (2021)    74 yo AAm, recently diagnosed metastatic small cell lung cancer 2021, undergoing chemo/XRT with recurrent right pleural effusion, now with brain mets from MRI done 2 days ago. Phone has been shut off, so medical providers unable to contact. Here with FTT and persistent N/V along with SOB    Active Problems:    Brain metastasis ([address] (10/18/2021)          Intractable vomiting with nausea (2021)          Dehydration (2021)          Hypokalemia (2021)          Recurrent right pleural effusion (10/6/2021)          Severe protein-calorie malnutrition ([address] (10/1/2021)          Nausea and vomiting (10/18/2021)            PLAN:  1) Admit to medical bed  2) IVF for dehydration  3) Will continue decadron that had been ordered for patient  4) Continue other home meds  5) Will need pulmonary or IR to place PleurX catheter. They have been unable to contact patient as well due to phone issues.   6) Consider palliative care for pain control and/or Hospice        Disposition/Expected LOS: 4  Diet: DIET ADULT  VTE ppx: lovenox  GI ppx: ppi  Code status: Prior  Surrogate decision-maker:       History of Presenting Illness:     Ashley Ramos is a 73 y.o. male with medical history of recently diagnosed metastatic small cell lung cancer 2021, undergoing chemo/XRT with recurrent right pleural effusion, now with brain mets from MRI done 2 days ago. Phone has been shut off, so medical providers unable to contact. Here with FTT and persistent N/V along with SOB. He had recent admission for SOB, 10/6/21--10/8/21, underwent thoracentesis with plans for Pleurx catheter due to recurrent nature of pleural effusion, but Pulmonary has been unable to contact due to phone issues. Review of Systems:  A 14 point review of systems was taken and pertinent positive as per HPI.         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, blind in left eye,  deaf in left ear,\"every bone in skull was broken\"       Past Surgical History:   Procedure Laterality Date    HX APPENDECTOMY      HX HEENT  9/2009    facial reconstruction post trauma       Family History : reviewed  Family History   Problem Relation Age of Onset    Diabetes Maternal Aunt     Diabetes Paternal Aunt         Social History     Tobacco Use    Smoking status: Current Every Day Smoker     Packs/day: 0.50     Years: 30.00     Pack years: 15.00    Smokeless tobacco: Never Used   Substance Use Topics    Alcohol use: Not Currently     Alcohol/week: 0.8 standard drinks     Types: 1 Cans of beer per week       No Known Allergies    Immunization History   Administered Date(s) Administered    Covid-19, MODERNA, Mrna, Lnp-s, Pf, 100mcg/0.5mL 04/23/2021, 05/21/2021         PTA Medications:  Current Outpatient Medications   Medication Instructions    Advair Diskus 250-50 mcg/dose diskus inhaler 1 Puff, Inhalation, 2 TIMES DAILY    albuterol (PROVENTIL HFA, VENTOLIN HFA, PROAIR HFA) 90 mcg/actuation inhaler 1 Puff, Inhalation, EVERY 4 HOURS AS NEEDED    aspirin delayed-release 81 mg, Oral, DAILY, EC     dexAMETHasone (DECADRON) 4 mg, Oral, EVERY 6 HOURS    fluticasone propionate (FLONASE) 50 mcg/actuation nasal spray 2 Sprays, Both Nostrils, DAILY    furosemide (LASIX) 20 mg, Oral, DAILY    gabapentin (NEURONTIN) 300 mg, Oral, 3 TIMES DAILY    HYDROcodone-acetaminophen (Norco)  mg tablet 1 Tablet, Oral, EVERY 4 HOURS    magnesium oxide (MAG-OX) 400 mg, Oral, 4 TIMES DAILY    metoprolol tartrate (LOPRESSOR) 12.5 mg, Oral, 2 TIMES DAILY    mirtazapine (REMERON) 15 mg, Oral, EVERY BEDTIME    morphine CR (MS CONTIN) 15 mg, Oral, EVERY 12 HOURS    ondansetron hcl (ZOFRAN) 4 mg, Oral, EVERY 8 HOURS AS NEEDED    pantoprazole (PROTONIX) 40 mg, Oral, DAILY    potassium chloride SR (Klor-Con 10) 10 mEq tablet 10 mEq, Oral, DAILY    prochlorperazine (COMPAZINE) 10 mg, Oral, EVERY 6 HOURS AS NEEDED       Objective:     Patient Vitals for the past 24 hrs:   Temp Pulse Resp BP SpO2   10/18/21 1930 97.5 °F (36.4 °C) (!) 114 16 120/74 100 %   10/18/21 1624 97.6 °F (36.4 °C) (!) 119 16 119/76 100 %       Oxygen Therapy  O2 Sat (%): 100 % (10/18/21 1930)  O2 Device: None (Room air) (10/18/21 1930)    Body mass index is 23.15 kg/m². Physical Exam:    General:  Alert and oriented x 3, No acute distress, speaking in full sentences  HEENT:  Pupils equal and reactive to light and accommodation, oropharynx is clear   Neck:   Supple, no lymphadenopathy, no JVD   Lungs:  Diminished breath sounds right base  CV:   Regular rate, regular rhythm, with normal S1 and S2   Abdomen:  Soft, nontender, nondistended, normoactive bowel sounds   Extremities:  No cyanosis clubbing or edema   Neuro:  Nonfocal, A&O x3   Psych:  Normal mood and affect       Data Reviewed: I have reviewed all labs, meds, and studies.       Recent Results (from the past 24 hour(s))   CBC WITH AUTOMATED DIFF    Collection Time: 10/18/21  4:54 PM   Result Value Ref Range    WBC 7.3 4.3 - 11.1 K/uL    RBC 3.94 (L) 4.23 - 5.6 M/uL    HGB 9.3 (L) 13.6 - 17.2 g/dL    HCT 29.8 (L) 41.1 - 50.3 %    MCV 75.6 (L) 79.6 - 97.8 FL    MCH 23.6 (L) 26.1 - 32.9 PG    MCHC 31.2 (L) 31.4 - 35.0 g/dL    RDW 16.7 (H) 11.9 - 14.6 %    PLATELET 985 (H) 023 - 450 K/uL    MPV 8.7 (L) 9.4 - 12.3 FL    ABSOLUTE NRBC 0.00 0.0 - 0.2 K/uL    NEUTROPHILS 52 47 - 75 %    BAND NEUTROPHILS 2 0 - 6 %    LYMPHOCYTES 20 16 - 44 %    MONOCYTES 24 (H) 3 - 9 %    METAMYELOCYTES 1 %    MYELOCYTES 1 %    ABS. NEUTROPHILS 4.0 1.7 - 8.2 K/UL    ABS. LYMPHOCYTES 1.5 0.5 - 4.6 K/UL    ABS. MONOCYTES 1.8 (H) 0.1 - 1.3 K/UL    RBC COMMENTS MARKED  HYPOCHROMIA        RBC COMMENTS MARKED  MICROCYTOSIS        RBC COMMENTS OCCASIONAL  TARGET CELLS        WBC COMMENTS OCCASIONAL      PLATELET COMMENTS INCREASED      DF MANUAL     LIPASE    Collection Time: 10/18/21  4:54 PM   Result Value Ref Range    Lipase 75 73 - 966 U/L   METABOLIC PANEL, COMPREHENSIVE    Collection Time: 10/18/21  4:54 PM   Result Value Ref Range    Sodium 131 (L) 136 - 145 mmol/L    Potassium 3.3 (L) 3.5 - 5.1 mmol/L    Chloride 92 (L) 98 - 107 mmol/L    CO2 27 21 - 32 mmol/L    Anion gap 12 7 - 16 mmol/L    Glucose 83 65 - 100 mg/dL    BUN 9 8 - 23 MG/DL    Creatinine 0.54 (L) 0.8 - 1.5 MG/DL    GFR est AA >60 >60 ml/min/1.73m2    GFR est non-AA >60 >60 ml/min/1.73m2    Calcium 9.9 8.3 - 10.4 MG/DL    Bilirubin, total 0.3 0.2 - 1.1 MG/DL    ALT (SGPT) 13 12 - 65 U/L    AST (SGOT) 18 15 - 37 U/L    Alk.  phosphatase 80 50 - 136 U/L    Protein, total 8.7 (H) 6.3 - 8.2 g/dL    Albumin 2.2 (L) 3.2 - 4.6 g/dL    Globulin 6.5 (H) 2.3 - 3.5 g/dL    A-G Ratio 0.3 (L) 1.2 - 3.5     TROPONIN-HIGH SENSITIVITY    Collection Time: 10/18/21  4:54 PM   Result Value Ref Range    Troponin-High Sensitivity 11.2 0 - 14 pg/mL   NT-PRO BNP    Collection Time: 10/18/21  4:54 PM   Result Value Ref Range    NT pro- (H) 5 - 125 PG/ML   EKG, 12 LEAD, INITIAL    Collection Time: 10/18/21  5:01 PM   Result Value Ref Range    Ventricular Rate 115 BPM    Atrial Rate 115 BPM    P-R Interval 132 ms    QRS Duration 82 ms    Q-T Interval 292 ms    QTC Calculation (Bezet) 403 ms    Calculated P Axis 65 degrees    Calculated R Axis 92 degrees    Calculated T Axis -17 degrees    Diagnosis       !! AGE AND GENDER SPECIFIC ECG ANALYSIS !! Sinus tachycardia  Possible Left atrial enlargement  Rightward axis  T wave abnormality, consider inferior ischemia  T wave abnormality, consider anterolateral ischemia  Abnormal ECG  When compared with ECG of 06-OCT-2021 14:48,  T wave inversion now evident in Inferior leads  T wave inversion now evident in Anterior leads         EKG Results     Procedure 720 Value Units Date/Time    EKG, 12 LEAD, INITIAL [[id number]] Collected: 10/18/21 1701    Order Status: Completed Updated: 10/18/21 1739     Ventricular Rate 115 BPM      Atrial Rate 115 BPM      P-R Interval 132 ms      QRS Duration 82 ms      Q-T Interval 292 ms      QTC Calculation (Bezet) 403 ms      Calculated P Axis 65 degrees      Calculated R Axis 92 degrees      Calculated T Axis -17 degrees      Diagnosis --     !! AGE AND GENDER SPECIFIC ECG ANALYSIS !! Sinus tachycardia  Possible Left atrial enlargement  Rightward axis  T wave abnormality, consider inferior ischemia  T wave abnormality, consider anterolateral ischemia  Abnormal ECG  When compared with ECG of 06-OCT-2021 14:48,  T wave inversion now evident in Inferior leads  T wave inversion now evident in Anterior leads            All Micro Results     None          Other Studies:  XR CHEST PA LAT    Result Date: 10/18/2021  EXAMINATION: XR CHEST PA LAT 10/18/2021 4:47 PM INDICATION: Generalized weakness for one week with nausea vomiting and diarrhea. Lung cancer. COMPARISON: Chest radiograph 10/7/2021 TECHNIQUE: PA and lateral views of the chest were obtained. FINDINGS: Osseous: No acute abnormality. Cardiomediastinal Silhouette: Obscured Lungs: Right lung consolidation is unchanged. No left lung abnormality.  Pleura: Large right pleural effusion is unchanged. No pneumothorax. No left pleural abnormality. Upper Abdomen: No abnormality.      Unchanged large right pleural effusion from 10/7/2021         Medications:  Medications Administered      Medications Administered     ondansetron (ZOFRAN) injection 4 mg     Admin Date  10/18/2021 Action  Given Dose  4 mg Route  IntraVENous Administered By  Mackenzie Govea RN          sodium chloride 0.9 % bolus infusion 1,000 mL     Admin Date  10/18/2021 Action  New Bag Dose  1,000 mL Rate  1,000 mL/hr Route  IntraVENous Administered By  Mackenzie Govea RN                    Signed By: Deuce Moya MD   [facility] Hospitalist Service    October 18, 2021   7:51 PM

## 2021-10-18 NOTE — ED PROVIDER NOTES
Patient is a 18-year-old male with a history of lung cancer who presents to the emergency department today via EMS. Nursing triage reported patient complained of nausea. Patient tells me that he is short of breath. He is a very poor historian. He does not seem to understand his disease. On reading the records he had recent outpatient MRI scan that showed brain metastases. The history is provided by the patient. Nausea   This is a chronic problem. The problem has been gradually worsening. There has been no fever. Pertinent negatives include no chills, no fever, no abdominal pain, no diarrhea and no cough.         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, blind in left eye,  deaf in left ear,\"every bone in skull was broken\"       Past Surgical History:   Procedure Laterality Date    HX APPENDECTOMY      HX HEENT  9/2009    facial reconstruction post trauma         Family History:   Problem Relation Age of Onset    Diabetes Maternal Aunt     Diabetes Paternal Aunt        Social History     Socioeconomic History    Marital status:      Spouse name: Not on file    Number of children: Not on file    Years of education: Not on file    Highest education level: Not on file   Occupational History    Not on file   Tobacco Use    Smoking status: Current Every Day Smoker     Packs/day: 0.50     Years: 30.00     Pack years: 15.00    Smokeless tobacco: Never Used   Substance and Sexual Activity    Alcohol use: Not Currently     Alcohol/week: 0.8 standard drinks     Types: 1 Cans of beer per week    Drug use: No    Sexual activity: Not on file   Other Topics Concern     Service Not Asked    Blood Transfusions Not Asked    Caffeine Concern Not Asked    Occupational Exposure Not Asked   Kiko Araiza Hazards Not Asked    Sleep Concern Not Asked    Stress Concern Not Asked    Weight Concern Not Asked    Special Diet Not Asked    Back Care Not Asked    Exercise Not Asked    Bike Helmet Not Asked   [address] Not Asked    Self-Exams Not Asked   Social History Narrative    Not on file     Social Determinants of Health     Financial Resource Strain:     Difficulty of Paying Living Expenses:    Food Insecurity:     Worried About Running Out of Food in the Last Year:     [address] in the Last Year:    Transportation Needs:     Lack of Transportation (Medical):  Lack of Transportation (Non-Medical):    Physical Activity:     Days of Exercise per Week:     Minutes of Exercise per Session:    Stress:     Feeling of Stress :    Social Connections:     Frequency of Communication with Friends and Family:     Frequency of Social Gatherings with Friends and Family:     Attends [employer] Services:     Active Member of Clubs or Organizations:     Attends Club or Organization Meetings:     Marital Status:    Intimate Partner Violence:     Fear of Current or Ex-Partner:     Emotionally Abused:     Physically Abused:     Sexually Abused: ALLERGIES: Patient has no known allergies. Review of Systems   Constitutional: Negative for chills, fatigue and fever. HENT: Negative for congestion, rhinorrhea and sore throat. Eyes: Negative for pain, discharge and visual disturbance. Respiratory: Positive for shortness of breath. Negative for cough. Cardiovascular: Negative for chest pain and palpitations. Gastrointestinal: Positive for nausea and vomiting. Negative for abdominal pain and diarrhea. Endocrine: Negative for polydipsia and polyuria. Genitourinary: Negative for dysuria, frequency and urgency. Musculoskeletal: Negative for back pain and neck pain. Skin: Negative for rash.    Neurological: Negative for seizures, syncope and weakness. Hematological: Negative. Vitals:    10/18/21 1624   BP: 119/76   Pulse: (!) 119   Resp: 16   Temp: 97.6 °F (36.4 °C)   SpO2: 100%   Weight: 75.3 kg (166 lb)   Height: 5' 11\" (1.803 m)            Physical Exam  Vitals and nursing note reviewed. Constitutional:       Appearance: Normal appearance. He is well-developed. He is ill-appearing. Comments: Cachectic   HENT:      Head: Normocephalic and atraumatic. Nose: Nose normal.      Mouth/Throat:      Mouth: Mucous membranes are dry. Eyes:      Extraocular Movements: Extraocular movements intact. Conjunctiva/sclera: Conjunctivae normal.      Pupils: Pupils are equal, round, and reactive to light. Cardiovascular:      Rate and Rhythm: Regular rhythm. Tachycardia present. Heart sounds: Normal heart sounds. Pulmonary:      Effort: Pulmonary effort is normal.      Breath sounds: Rales present. Abdominal:      Palpations: Abdomen is soft. Tenderness: There is no abdominal tenderness. There is no guarding or rebound. Musculoskeletal:         General: No tenderness. Normal range of motion. Cervical back: Normal range of motion and neck supple. Lymphadenopathy:      Cervical: No cervical adenopathy. Skin:     General: Skin is warm and dry. Findings: No rash. Neurological:      General: No focal deficit present. Mental Status: He is alert and oriented to person, place, and time. Mental status is at baseline. GCS: GCS eye subscore is 4. GCS verbal subscore is 5. GCS motor subscore is 6. Cranial Nerves: No cranial nerve deficit. Sensory: No sensory deficit. Motor: Motor function is intact. Comments: No focal deficits. MDM  Number of Diagnoses or Management Options  Diagnosis management comments: I wore appropriate PPE throughout this patient's ED visit.  Andrew Dang MD, 4:43 PM    XR CHEST PA LAT   Final Result    Unchanged large right pleural effusion from 10/7/2021    6:09 PM  Troponin negative  proBNP negative  White blood cell count normal  Chronic anemia but hemoglobin stable at 9    Patient not hypoxic here    Given his recurrent pleural effusion and tachycardia with intractable nausea and vomiting I think the patient needs admission to the hospital.  On reviewing prior records she reportedly has some brain mets noted on recent MRI and it appears they have been unable to reach the patient via telephone to inform him of this. Patient does not seem to fully understand his illness. Will consult with hospitalist service for admission tonight and transfer to oncology in the morning. Voice dictation software was used during the making of this note. This software is not perfect and grammatical and other typographical errors may be present. This note has been proofread, but may still contain errors.   Lon Rosenberg MD; 10/18/2021 @6:10 PM   ===================================================================                  Amount and/or Complexity of Data Reviewed  Clinical lab tests: ordered  Tests in the radiology section of CPT®: ordered and reviewed  Tests in the medicine section of CPT®: ordered  Review and summarize past medical records: yes  Discuss the patient with other providers: yes  Independent visualization of images, tracings, or specimens: yes    Risk of Complications, Morbidity, and/or Mortality  Presenting problems: moderate  Diagnostic procedures: low  Management options: low    Patient Progress  Patient progress: improved         Procedures

## 2021-10-18 NOTE — ED TRIAGE NOTES
Per EMS pt has generalized weakness for the last week, n/v/d, pt has lung cancer and is supposed to be receiving chemo, however his phone shut off and has not been able to go to his appointments. States his home health nurse has not shown up.

## 2021-10-19 NOTE — PROGRESS NOTES
Care Management Interventions  PCP Verified by CM: Yes  Support Systems: No Support Systems  Discharge Location  Discharge Placement: Unable to determine at this time    Saw pt in interdisciplinarily rounds with the following disciplines: Physician, Case Management, Nursing, Dietary,Therapy and Pharmacy. The plan of care was discussed along with goals for discharge date/ location. Per , pt is receiving chemo, Palliative Care is here to see pt, SW will follow-up w/ pt    3:15pm-SW spoke w/ [facility] NP) Oncology has taken over pt's care, pt has lung and brain cancer, Pt has made his mother and sister his POA.  SW will follow pt until d/c       RADHA Leal

## 2021-10-19 NOTE — PROGRESS NOTES
END OF SHIFT NOTE:    Intake/Output  10/18 1901 - 10/19 0700  In: 686 [P.O.:300; I.V.:367]  Out: 0    Voiding: YES  Catheter: NO  Drain:              Stool:  3 occurrences. Stool Assessment  Stool Color: Other (Comment) (pt flushed) (10/18/21 2156)  Stool Appearance: Loose (10/18/21 2130)  Stool Amount: Small (10/18/21 2130)  Stool Source/Status: Rectum; Incontinence (10/18/21 2130)    Emesis:  0 occurrences. VITAL SIGNS  Patient Vitals for the past 12 hrs:   Temp Pulse Resp BP SpO2   10/19/21 0301 97.9 °F (36.6 °C) 97 18 101/67 100 %   10/18/21 2224 98.7 °F (37.1 °C) (!) 103 18 (!) 92/59 99 %   10/18/21 2038 97.7 °F (36.5 °C) (!) 131 20 120/73 100 %   10/18/21 1930 97.5 °F (36.4 °C) (!) 114 16 120/74 100 %   10/18/21 1624 97.6 °F (36.4 °C) (!) 119 16 119/76 100 %       Pain Assessment  Pain 1  Pain Scale 1: Numeric (0 - 10) (10/19/21 0140)  Pain Intensity 1: 3 (10/19/21 0140)  Patient Stated Pain Goal: 0 (10/19/21 0140)  Pain Reassessment 1: Yes (10/19/21 0140)  Pain Onset 1: pta (10/19/21 0056)  Pain Location 1: Abdomen (10/19/21 0056)  Pain Orientation 1: Mid (10/19/21 0056)  Pain Description 1: Aching (10/19/21 0056)  Pain Intervention(s) 1: Medication (see MAR); Rest (10/19/21 0056)    Ambulating  Yes    Additional Information:   New admit w/ SOB,n/v/d;case of Lung CA. POC explained;on clear liquids. Had 3x BM incontinence loose stool;MD contacted order for c diff obtained;pending collection. On RA sats >94%. Shift report given to oncoming nurse Abi,RN at the bedside.     Marylu Chou, RICH

## 2021-10-19 NOTE — ED NOTES
TRANSFER - OUT REPORT:    Verbal report given to 3rd floor RN  on Ashley Ramos  being transferred to room 364 for routine progression of care       Report consisted of patients Situation, Background, Assessment and   Recommendations(SBAR). Information from the following report(s) SBAR was reviewed with the receiving nurse. Lines:   Peripheral IV 10/18/21 Right Antecubital (Active)        Opportunity for questions and clarification was provided.       Patient transported with:   [employer]

## 2021-10-19 NOTE — TELEPHONE ENCOUNTER
Spoke with Griffin Leo NP regarding radiation therapy plans for brain mets now that pt is admitted to hospital.  Per Dr. Gladys Kyle, nursing is to assess pt on 10/20/21 to see if pt is stable to come to [address] [place] [address] for a consultation and CT/Sim, and then for RT treatment on 10/21/21 to his brain.

## 2021-10-19 NOTE — PROGRESS NOTES
TRANSFER - IN REPORT:    Verbal report received from Chay Carrasco RN on Maksim Coker  being received from ED for routine progression of care      Report consisted of patients Situation, Background, Assessment and   Recommendations(SBAR). Information from the following report(s) SBAR, Kardex, ED Summary, Intake/Output, MAR and Recent Results was reviewed with the receiving nurse. Opportunity for questions and clarification was provided. Assessment completed upon patients arrival to unit and care assumed.

## 2021-10-19 NOTE — PROGRESS NOTES
Problem: Falls - Risk of  Goal: *Absence of Falls  Description: Document Chandler Cea Fall Risk and appropriate interventions in the flowsheet.   Outcome: Progressing Towards Goal  Note: Fall Risk Interventions:  Mobility Interventions: Bed/chair exit alarm, Communicate number of staff needed for ambulation/transfer, Patient to call before getting OOB, Strengthening exercises (ROM-active/passive), Utilize walker, cane, or other assistive device         Medication Interventions: Teach patient to arise slowly, Patient to call before getting OOB, Bed/chair exit alarm    Elimination Interventions: Call light in reach, Elevated toilet seat, Bed/chair exit alarm, Patient to call for help with toileting needs, Stay With Me (per policy), Toilet paper/wipes in reach, Toileting schedule/hourly rounds, Urinal in reach    History of Falls Interventions: Room close to nurse's station, Door open when patient unattended, Bed/chair exit alarm

## 2021-10-19 NOTE — H&P
[facility] Hematology & Oncology        Inpatient Hematology / Oncology History and Physical    Reason for Admission:  Nausea and vomiting [R11.2]    History of Present Illness:  [facility]. [facility] is a 73 y.o. male admitted on 10/18/2021 with a primary diagnosis of The primary encounter diagnosis was Pleural effusion. A diagnosis of Malignant neoplasm of lung, unspecified laterality, unspecified part of lung ([address] was also pertinent to this visit. 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 He is a current tobacco smoker. He is a relatively new patient of Dr Kina Hill seen for the first time 9/2021. He underwent CT scan on 8/12/21 for shortness of breath and noted right pleural effusion and large mediastinal mass as well as left adrenal nodule and left axillary node. He underwent right thoracentesis which was negative for malignant cells. PET scan showed axillary, mediastinal, RP and left perihilar adenopathy as well as destructive left lateral rib lesion and multiple bony metastatic sites including C7, C6, right scapula, ribs and left iliac bone. Biopsy of left axillary node showed SCLC. It was planned to start carbo/etoposide/tecentriq. However, he had limited performance status and he received first cycle of chemotherapy inpatient (no Tecentriq) and was discharged 10/3/21. Of note, he was recently re-admitted for recurrent pleural effusion and had 800cc on 10/7/21. Cytology not sent. However, it was planned for him to have Pleurx placed as an outpatient. Mr [facility] presented to the ED 10/18/21 with c/o generalized weakness, nausea, vomiting and diarrhea for the preceding week. It was noted that he had a recent MRI that showed brain mets and his phone was shut off and was therefore unable to be notified of the results. He was admitted for further management of nausea, vomiting, diarrhea and dehydration.  Oncology asked to assume care of our patient. Review of Systems:  Constitutional Denies fever, chills, weight loss, appetite changes, fatigue, night sweats. HEENT +hx of traumatic injury with hearing/vision changes, no new symptoms. Denies ear pain, nosebleeds, sore throat, neck pain and ear discharge. Skin Denies lesions or rashes. Lungs Denies dyspnea, cough, sputum production or hemoptysis. Cardiovascular Denies chest pain, palpitations, or lower extremity edema. Gastrointestinal Denies nausea, vomiting, changes in bowel habits, bloody or black stools, abdominal pain.  Denies dysuria, frequency or hesitancy of urination. Neuro Denies headaches, visual changes or ataxia. Denies dizziness, tingling, tremors, sensory change, speech change, focal weakness or headaches. Hematology Denies easy bruising or bleeding, denies gingival bleeding or epistaxis. Endo Denies heat/cold intolerance, denies diabetes or thyroid abnormalities. MSK Denies back pain, arthralgias, myalgias or frequent falls. Psychiatric/Behavioral Denies depression and substance abuse. The patient is not nervous/anxious.          No Known Allergies  Past Medical History:   Diagnosis Date    Cancer ([facility]     lung cancer    Chronic pain     headaches    Lung cancer ([facility] [address] [facility] 9/29/2021    Nausea & vomiting 9/23/2021    Sinusitis 4/2010     taking abx    Trauma 9/19/2009    brain injury- left side affected-  bilat eye injuries, blind in left eye,  deaf in left ear,\"every bone in skull was broken\"     Past Surgical History:   Procedure Laterality Date    HX APPENDECTOMY      HX HEENT  9/2009    facial reconstruction post trauma     Family History   Problem Relation Age of Onset    Diabetes Maternal Aunt     Diabetes Paternal Aunt      Social History     Socioeconomic History    Marital status:      Spouse name: Not on file    Number of children: Not on file    Years of education: Not on file    Highest education level: Not on file   Occupational History    Not on file   Tobacco Use    Smoking status: Current Every Day Smoker     Packs/day: 0.50     Years: 30.00     Pack years: 15.00    Smokeless tobacco: Never Used   Substance and Sexual Activity    Alcohol use: Not Currently     Alcohol/week: 0.8 standard drinks     Types: 1 Cans of beer per week    Drug use: No    Sexual activity: Not on file   Other Topics Concern     Service Not Asked    Blood Transfusions Not Asked    Caffeine Concern Not Asked    Occupational Exposure Not Asked    Hobby Hazards Not Asked    Sleep Concern Not Asked    Stress Concern Not Asked    Weight Concern Not Asked    Special Diet Not Asked    Back Care Not Asked    Exercise Not Asked    Bike Helmet Not Asked   [address] Not Asked    Self-Exams Not Asked   Social History Narrative    Not on file     Social Determinants of Health     Financial Resource Strain:     Difficulty of Paying Living Expenses:    Food Insecurity:     Worried About Running Out of Food in the Last Year:     Ran Out of Food in the Last Year:    Transportation Needs:     Lack of Transportation (Medical):      Lack of Transportation (Non-Medical):    Physical Activity:     Days of Exercise per Week:     Minutes of Exercise per Session:    Stress:     Feeling of Stress :    Social Connections:     Frequency of Communication with Friends and Family:     Frequency of Social Gatherings with Friends and Family:     Attends Christian Services:     Active Member of Clubs or Organizations:     Attends Club or Organization Meetings:     Marital Status:    Intimate Partner Violence:     Fear of Current or Ex-Partner:     Emotionally Abused:     Physically Abused:     Sexually Abused:      Current Facility-Administered Medications   Medication Dose Route Frequency Provider Last Rate Last Admin    magnesium oxide (MAG-OX) tablet 400 mg  400 mg Oral BID Mariano Duncan MD   400 mg at 10/19/21 3939    0.9% sodium chloride with KCl 20 mEq/L infusion   IntraVENous CONTINUOUS Leonela Falcon MD        gabapentin (NEURONTIN) capsule 100 mg  100 mg Oral TID Leonela Falcon MD        HYDROcodone-acetaminophen [facility])  mg tablet 1 Tablet  1 Tablet Oral Q6H PRN Leonela Falcon MD        mirtazapine (REMERON) tablet 7.5 mg  7.5 mg Oral QHS Leonela Falcon MD        morphine injection 4 mg  4 mg IntraVENous Q6H PRN Hector Jernigan NP        morphine CR (MS CONTIN) tablet 15 mg  15 mg Oral Q12H Alexx Perez NP        morphine CR (MS CONTIN) tablet 30 mg  30 mg Oral Q12H Alexx Perez NP        naloxone [facility]) injection 0.4 mg  0.4 mg IntraVENous PRN Hector Jernigan NP        acetaminophen (TYLENOL) tablet 650 mg  650 mg Oral Q6H PRN Margarita Last MD        Or    acetaminophen (TYLENOL) suppository 650 mg  650 mg Rectal Q6H PRN Margarita Last MD        polyethylene glycol (MIRALAX) packet 17 g  17 g Oral DAILY PRN Margarita Last MD        ondansetron (ZOFRAN ODT) tablet 4 mg  4 mg Oral Q8H PRN Margarita Last MD        Or    ondansetron [facility]) injection 4 mg  4 mg IntraVENous Q6H PRN Margarita Last MD   4 mg at 10/19/21 0056    enoxaparin (LOVENOX) injection 40 mg  40 mg SubCUTAneous Q24H Gregor ESQUIVEL MD   40 mg at 10/18/21 2123    budesonide-formoterol (SYMBICORT) 80-4.5 mcg inhaler  2 Puff Inhalation BID RT Margarita Last MD   2 Puff at 10/19/21 0663    dexAMETHasone (DECADRON) tablet 4 mg  4 mg Oral Q6H Margarita Last MD   4 mg at 10/19/21 0509    fluticasone propionate (FLONASE) 50 mcg/actuation nasal spray 2 Spray  2 Spray Both Nostrils DAILY Margarita Last MD        metoprolol tartrate (LOPRESSOR) tablet 12.5 mg  12.5 mg Oral BID Gregor ESQUIVEL MD   12.5 mg at 10/19/21 0135    pantoprazole (PROTONIX) tablet 40 mg  40 mg Oral DAILY Margarita Last MD   40 mg at 10/19/21 3427       OBJECTIVE:  Patient [facility] for the past 8 hrs:   BP Temp Pulse Resp SpO2   10/19/21 1110 118/73 98.5 °F (36.9 °C) 95 18 99 %   10/19/21 0822 -- -- -- -- 97 %   10/19/21 0749 111/77 98.3 °F (36.8 °C) 97 17 99 %     Temp (24hrs), Av °F (36.7 °C), Min:97.5 °F (36.4 °C), Max:98.7 °F (37.1 °C)    No intake/output data recorded. Physical Exam:  Constitutional: Well developed, well nourished male in no acute distress, sitting comfortably in the hospital bed. HEENT: Normocephalic and atraumatic. Oropharynx is clear, mucous membranes are moist.  Pupils are equal, round, and reactive to light. Extraocular muscles are intact. Sclerae anicteric. Neck supple without JVD. No thyromegaly present. Lymph node   No palpable submandibular, cervical, supraclavicular, axillary or inguinal lymph nodes. Skin Warm and dry. No bruising and no rash noted. No erythema. No pallor. Respiratory Lungs are clear to auscultation bilaterally without wheezes, rales or rhonchi, normal air exchange without accessory muscle use. CVS Normal rate, regular rhythm and normal S1 and S2. No murmurs, gallops, or rubs. Abdomen Soft, nontender and nondistended, normoactive bowel sounds. No palpable mass. No hepatosplenomegaly. Neuro Grossly nonfocal with no obvious sensory or motor deficits. MSK Normal range of motion in general.  No edema and no tenderness. Psych Appropriate mood and affect.         Labs:    Recent Results (from the past 24 hour(s))   CBC WITH AUTOMATED DIFF    Collection Time: 10/18/21  4:54 PM   Result Value Ref Range    WBC 7.3 4.3 - 11.1 K/uL    RBC 3.94 (L) 4.23 - 5.6 M/uL    HGB 9.3 (L) 13.6 - 17.2 g/dL    HCT 29.8 (L) 41.1 - 50.3 %    MCV 75.6 (L) 79.6 - 97.8 FL    MCH 23.6 (L) 26.1 - 32.9 PG    MCHC 31.2 (L) 31.4 - 35.0 g/dL    RDW 16.7 (H) 11.9 - 14.6 %    PLATELET 408 (H) 935 - 450 K/uL    MPV 8.7 (L) 9.4 - 12.3 FL    ABSOLUTE NRBC 0.00 0.0 - 0.2 K/uL    NEUTROPHILS 52 47 - 75 %    BAND NEUTROPHILS 2 0 - 6 %    LYMPHOCYTES 20 16 - 44 %    MONOCYTES 24 (H) 3 - 9 %    METAMYELOCYTES 1 % MYELOCYTES 1 %    ABS. NEUTROPHILS 4.0 1.7 - 8.2 K/UL    ABS. LYMPHOCYTES 1.5 0.5 - 4.6 K/UL    ABS. MONOCYTES 1.8 (H) 0.1 - 1.3 K/UL    RBC COMMENTS MARKED  HYPOCHROMIA        RBC COMMENTS MARKED  MICROCYTOSIS        RBC COMMENTS OCCASIONAL  TARGET CELLS        WBC COMMENTS OCCASIONAL      PLATELET COMMENTS INCREASED      DF MANUAL     LIPASE    Collection Time: 10/18/21  4:54 PM   Result Value Ref Range    Lipase 75 73 - 897 U/L   METABOLIC PANEL, COMPREHENSIVE    Collection Time: 10/18/21  4:54 PM   Result Value Ref Range    Sodium 131 (L) 136 - 145 mmol/L    Potassium 3.3 (L) 3.5 - 5.1 mmol/L    Chloride 92 (L) 98 - 107 mmol/L    CO2 27 21 - 32 mmol/L    Anion gap 12 7 - 16 mmol/L    Glucose 83 65 - 100 mg/dL    BUN 9 8 - 23 MG/DL    Creatinine 0.54 (L) 0.8 - 1.5 MG/DL    GFR est AA >60 >60 ml/min/1.73m2    GFR est non-AA >60 >60 ml/min/1.73m2    Calcium 9.9 8.3 - 10.4 MG/DL    Bilirubin, total 0.3 0.2 - 1.1 MG/DL    ALT (SGPT) 13 12 - 65 U/L    AST (SGOT) 18 15 - 37 U/L    Alk.  phosphatase 80 50 - 136 U/L    Protein, total 8.7 (H) 6.3 - 8.2 g/dL    Albumin 2.2 (L) 3.2 - 4.6 g/dL    Globulin 6.5 (H) 2.3 - 3.5 g/dL    A-G Ratio 0.3 (L) 1.2 - 3.5     TROPONIN-HIGH SENSITIVITY    Collection Time: 10/18/21  4:54 PM   Result Value Ref Range    Troponin-High Sensitivity 11.2 0 - 14 pg/mL   NT-PRO BNP    Collection Time: 10/18/21  4:54 PM   Result Value Ref Range    NT pro- (H) 5 - 125 PG/ML   EKG, 12 LEAD, INITIAL    Collection Time: 10/18/21  5:01 PM   Result Value Ref Range    Ventricular Rate 115 BPM    Atrial Rate 115 BPM    P-R Interval 132 ms    QRS Duration 82 ms    Q-T Interval 292 ms    QTC Calculation (Bezet) 403 ms    Calculated P Axis 65 degrees    Calculated R Axis 92 degrees    Calculated T Axis -17 degrees    Diagnosis       Sinus tachycardia  Possible Left atrial enlargement  Rightward axis  T wave abnormality, consider inferior ischemia  T wave abnormality, consider anterolateral ischemia  When compared with ECG of 06-OCT-2021 14:48,  T wave changes noted  Confirmed by Alejandra CAVAZOS, [facility] ([id number]) on 10/18/2021 98:27:29 PM     METABOLIC PANEL, BASIC    Collection Time: 10/19/21  5:56 AM   Result Value Ref Range    Sodium 130 (L) 136 - 145 mmol/L    Potassium 3.6 3.5 - 5.1 mmol/L    Chloride 96 (L) 98 - 107 mmol/L    CO2 24 21 - 32 mmol/L    Anion gap 10 7 - 16 mmol/L    Glucose 129 (H) 65 - 100 mg/dL    BUN 7 (L) 8 - 23 MG/DL    Creatinine 0.49 (L) 0.8 - 1.5 MG/DL    GFR est AA >60 >60 ml/min/1.73m2    GFR est non-AA >60 >60 ml/min/1.73m2    Calcium 9.2 8.3 - 10.4 MG/DL   MAGNESIUM    Collection Time: 10/19/21  5:56 AM   Result Value Ref Range    Magnesium 1.6 (L) 1.8 - 2.4 mg/dL       Imaging:  [unfilled]    ASSESSMENT:  Problem List  Date Reviewed: 9/29/2021        Codes Class Noted    Brain metastasis ([facility] [address] ICD-10-CM: C79.31  ICD-9-CM: 198.3  10/18/2021        Nausea and vomiting ICD-10-CM: R11.2  ICD-9-CM: 787.01  10/18/2021        Recurrent right pleural effusion ICD-10-CM: J90  ICD-9-CM: 511.9  10/6/2021        Severe protein-calorie malnutrition (HCC) (Chronic) ICD-10-CM: E43  ICD-9-CM: 262  10/1/2021        Listed for admission to hospital ICD-10-CM: Z75.1  ICD-9-CM: V63.2  9/29/2021        Dehydration ICD-10-CM: E86.0  ICD-9-CM: 276.51  9/23/2021        Intractable vomiting with nausea ICD-10-CM: R11.2  ICD-9-CM: 536.2  9/23/2021        Nausea ICD-10-CM: R11.0  ICD-9-CM: 787.02  9/23/2021        Hypokalemia ICD-10-CM: E87.6  ICD-9-CM: 276.8  9/23/2021        * (Principal) Small cell lung carcinoma, right (HCC) (Chronic) ICD-10-CM: C34.91  ICD-9-CM: 162.9  9/23/2021        Mediastinal mass ICD-10-CM: J98.59  ICD-9-CM: 786.6  8/16/2021        Shortness of breath ICD-10-CM: R06.02  ICD-9-CM: 786.05  8/16/2021        Axillary adenopathy ICD-10-CM: R59.0  ICD-9-CM: 785.6  8/16/2021        Adrenal nodule (HCC) ICD-10-CM: E27.8  ICD-9-CM: 255.8  8/16/2021                PLAN:    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, LAD, bone  - s/p C1 carbo/etop (no Tecentriq as he was inpatient) 9/30-10/2  - s/p palliative XRT to rib    Brain metastasis  - Recent MRI with bilateral cerebellar metastasis without mass effect  - Start Dex 4mg q6hrs IV  - Rad Onc previously aware - will notify that he is inpatient    Hx of recurrent pleural effusion  - No evidence of malignant cells on previous cytology  - Asymptomatic   - CXR with unchanged large right pleural effusion  - Was previously set up for outpatient Pleurx    Nausea, vomiting, dehydration  - Possibly secondary to brain mets vs recent chemo  - Continue antiemetics  - Continue IVF    Diarrhea  - C diff pending  - Can utilize antidiarrheals if negative     Cancer-related pain  - PC following    Hyponatremia, mild and electrolyte abnormalities  - Monitor  - Replace per [facility] SOPs    Continue home meds  [facility] SOPs    Lab studies and imaging studies were personally reviewed. Pertinent old records were reviewed from [address] [place] [address] Rd. Case discussed with Dr. Jenifer Cates. Thank you for allowing us to participate in the care of Mr. Brittni Wilkins. We will gladly assume care of our patient. Casey Swanson [facility] Hematology & Oncology  [address] [address]  Office : [phone]  Fax : [phone]   I personally saw, exammed and counselled the patient, and discussed with NP, agree with above history/assessment/plan. 73 y. o.male patient's diagnosis of extensive stage small cell lung cancer with limited performance status, status post palliative bone radiation and 1 cycle of cisplatin/etoposide although he claimed he did not know he had lung cancer, admitted for nausea/vomiting/diarrhea, MRI showed brain metastasis, continue high-dose Decadron and consult radiation oncology, check C. difficile, antiemetics, pain control and supportive care as above. Ke Adams M.D.   [address], [address] [place] [address]  Office : [phone]  Fax : [phone]

## 2021-10-19 NOTE — PROGRESS NOTES
Chart reviewed. Patient admitted w/ nausea and vomiting, brain mets, hx of lung small cc  Also has hypoNa, hypoK, hypoMg w/ low BP  I spoke to Oncology who kindly accepted pt transfer to their service.     Signed By: Dwayne Moya MD     October 19, 2021

## 2021-10-19 NOTE — PROGRESS NOTES
10/18/21 2130   Dual Skin Pressure Injury Assessment   Dual Skin Pressure Injury Assessment WDL   Second Care Provider (Based on Facility Policy) Manual Mate   Skin Integumentary   Skin Integumentary (WDL) X    Pressure  Injury Documentation No Pressure Injury Noted-Pressure Ulcer Prevention Initiated   Skin Color Appropriate for ethnicity   Skin Condition/Temp Dry;Flaky

## 2021-10-19 NOTE — CONSULTS
See H&P           Leola Saucedo [address] Hematology & Oncology  [address] [address]  Office : [phone]  Fax : [phone]

## 2021-10-19 NOTE — CONSULTS
Palliative Care    Patient: Chloe Leigh MRN: [id number]  SSN: [id number]    YOB: 1955  Age: 73 y.o. Sex: male       Date of Request: 10/19/21  Date of Consult:  10/19/2021  Reason for Consult:  goals of care  Requesting Physician: Dr Trey Ballard     Assessment/Plan:     Principal Diagnosis:    Pain, chest  R07.9    Additional Diagnoses:   · Advance Care Planning Counseling Z71.89  · Nausea/Vomiting  R11.2  · Pain, abdomen  R10.9  · Encounter for Palliative Care  Z51.5    Palliative Performance Scale (PPS):       Medical Decision Making:   Reviewed and summarized notes from admission to present. Discussed case with appropriate providers: Dr Trey Ballard  Reviewed laboratory and x-ray data from admission to present. Pt resting in bed, no visitors present. Mr Kassie Elliott endorsed pain in his chest and abdomen. He said that the pain stayed at a score of 4 whether he is staying still or moving. Increased his MS Contin to 30 mg q 12 hrs. Reminded him that he has Norco available for moderate breakthrough pain, and added morphine IV 4 mg q 6 hrs for severe breakthrough pain. Added Narcan. Mr Kassie Elliott denied nausea/vomiting for now; reminded him that anti-nausea med is available. Mr Kassie Elliott asked to be made DNR. After talking with his family, he decided that he wants his mother to be his [de-identified], and his sister to be the first alternate. Have placed a consult to [facility] for their assistance in completing the [address]. Will discuss findings with members of the interdisciplinary team.      Thank you for this referral.          .    Subjective:     History obtained from:  Care Provider and Chart    Chief Complaint: Chest pain    History of Present Illness:  Chloe Leigh is a 73 y.o. male with medical history of recently diagnosed metastatic small cell lung cancer August 2021, undergoing chemo/XRT with recurrent right pleural effusion, now with brain mets from MRI done 2 days ago.  Phone has been shut off, so medical providers unable to contact. Here with FTT and persistent N/V along with SOB.     He had recent admission for SOB, 10/6/21--10/8/21, underwent thoracentesis with plans for Pleurx catheter due to recurrent nature of pleural effusion, but Pulmonary has been unable to contact due to phone issues. Pt has now been transferred to the Oncology service. Advance Directive: No       Code Status:  DNR            Health Care Power of : No - Patient does not have a [facility]. Past Medical History:   Diagnosis Date    Cancer [facility])     lung cancer    Chronic pain     headaches    Lung cancer ([facility] 9/29/2021    Nausea & vomiting 9/23/2021    Sinusitis 4/2010     taking abx    Trauma 9/19/2009    brain injury- left side affected-  bilat eye injuries, blind in left eye,  deaf in left ear,\"every bone in skull was broken\"      Past Surgical History:   Procedure Laterality Date    HX APPENDECTOMY      HX HEENT  9/2009    facial reconstruction post trauma     Family History   Problem Relation Age of Onset    Diabetes Maternal Aunt     Diabetes Paternal Aunt       Social History     Tobacco Use    Smoking status: Current Every Day Smoker     Packs/day: 0.50     Years: 30.00     Pack years: 15.00    Smokeless tobacco: Never Used   Substance Use Topics    Alcohol use: Not Currently     Alcohol/week: 0.8 standard drinks     Types: 1 Cans of beer per week     Prior to Admission medications    Medication Sig Start Date End Date Taking? Authorizing Provider   metoprolol tartrate (LOPRESSOR) 25 mg tablet Take 0.5 Tablets by mouth two (2) times a day. 10/8/21  Yes Maxime Berger MD   albuterol (PROVENTIL HFA, VENTOLIN HFA, PROAIR HFA) 90 mcg/actuation inhaler Take 1 Puff by inhalation every four (4) hours as needed for Wheezing. 9/25/21  Yes Provider, Historical   Advair Diskus 250-50 mcg/dose diskus inhaler Take 1 Puff by inhalation two (2) times a day.  9/29/21  Yes Provider, Historical   HYDROcodone-acetaminophen (Norco)  mg tablet Take 1 Tablet by mouth every four (4) hours for 30 days. Max Daily Amount: 6 Tablets. 9/29/21 10/29/21 Yes Cristal Walters NP   mirtazapine (REMERON) 15 mg tablet Take 1 Tablet by mouth nightly. 9/2/21  Yes Ella Etienne MD   aspirin delayed-release 81 mg tablet Take 81 mg by mouth daily. EC   Yes Provider, Historical   potassium chloride SR (Klor-Con 10) 10 mEq tablet Take 10 mEq by mouth daily. Yes Provider, Historical   gabapentin (NEURONTIN) 300 mg capsule Take 300 mg by mouth three (3) times daily. Yes Provider, Historical   dexAMETHasone (DECADRON) 4 mg tablet Take 4 mg by mouth every six (6) hours. Indications: brain mets 10/16/21   Van Almodovar NP   prochlorperazine (COMPAZINE) 10 mg tablet Take 10 mg by mouth every six (6) hours as needed for Nausea or Vomiting. Provider, Historical   magnesium oxide (MAG-OX) 400 mg tablet Take 1 Tablet by mouth four (4) times daily. 10/2/21   Ysabel Andujar NP   fluticasone propionate (FLONASE) 50 mcg/actuation nasal spray 2 Sprays by Both Nostrils route daily. 9/27/21   Provider, Historical   morphine CR (MS CONTIN) 15 mg CR tablet Take 1 Tablet by mouth every twelve (12) hours for 30 days. Max Daily Amount: 30 mg. 9/29/21 10/29/21  Cristal Walters NP   pantoprazole (PROTONIX) 40 mg tablet Take 40 mg by mouth daily. Provider, Historical   furosemide (LASIX) 20 mg tablet Take 20 mg by mouth daily. Provider, Historical   ondansetron hcl (ZOFRAN) 4 mg tablet Take 4 mg by mouth every eight (8) hours as needed for Nausea or Vomiting. Provider, Historical       No Known Allergies     Review of Systems:  A comprehensive review of systems was negative except as noted above.      Objective:     Visit Vitals  /73 (BP 1 Location: Left upper arm, BP Patient Position: At rest)   Pulse 95   Temp 98.5 °F (36.9 °C)   Resp 18   Ht 5' 11\" (1.803 m)   Wt 133 lb 8 oz (60.6 kg) SpO2 99%   BMI 18.62 kg/m²        Physical Exam:    General:  Cooperative. No acute distress. Eyes:  Conjunctivae/corneas clear    Nose: Nares normal. Septum midline. Neck: Supple, symmetrical, trachea midline, no JVD   Lungs:   Clear to auscultation bilaterally, unlabored   Heart:  Regular rate and rhythm, no murmur    Abdomen:   Soft, non-tender, non-distended   Extremities: Normal, atraumatic, no cyanosis or edema   Skin: Skin color, texture, turgor normal. No rash or lesions.    Neurologic: Nonfocal   Psych: Alert and oriented      Assessment:     Hospital Problems  Date Reviewed: 9/29/2021        Codes Class Noted POA    Brain metastasis ([facility] ICD-10-CM: C79.31  ICD-9-CM: 198.3  10/18/2021 Unknown        Nausea and vomiting ICD-10-CM: R11.2  ICD-9-CM: 787.01  10/18/2021 Unknown        Recurrent right pleural effusion ICD-10-CM: J90  ICD-9-CM: 511.9  10/6/2021 Yes        Severe protein-calorie malnutrition ([facility] (Chronic) ICD-10-CM: E43  ICD-9-CM: 985  10/1/2021 Yes        Dehydration ICD-10-CM: E86.0  ICD-9-CM: 276.51  9/23/2021 Yes        Intractable vomiting with nausea ICD-10-CM: R11.2  ICD-9-CM: 536.2  9/23/2021 Unknown        Hypokalemia ICD-10-CM: E87.6  ICD-9-CM: 276.8  9/23/2021 Yes        * (Principal) Small cell lung carcinoma, right (HCC) (Chronic) ICD-10-CM: C34.91  ICD-9-CM: 162.9  9/23/2021 Yes              Signed By: William Vu NP     October 19, 2021

## 2021-10-20 NOTE — PROGRESS NOTES
[facility] to room but patient was not in room. He has gone for a procedure. I will attempt later. Adolfo Aviles M.Div.

## 2021-10-20 NOTE — PROGRESS NOTES
[facility] Hematology & Oncology        Inpatient Hematology / Oncology Progress Note    Reason for Admission:  Nausea and vomiting [R11.2]    24 Hour Events:  VSS, afebrile  Nausea improving  Diarrhea resolved  Ongoing pain         ROS:  Constitutional: Negative for fever, chills, weakness, malaise, fatigue. CV: Negative for chest pain, palpitations, edema. Respiratory: Negative for dyspnea, cough, wheezing. GI: Negative for nausea, abdominal pain, diarrhea. 10 point review of systems is otherwise negative with the exception of the elements mentioned above in the HPI.            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, blind in left eye,  deaf in left ear,\"every bone in skull was broken\"     Past Surgical History:   Procedure Laterality Date    HX APPENDECTOMY      HX HEENT  9/2009    facial reconstruction post trauma     Family History   Problem Relation Age of Onset    Diabetes Maternal Aunt     Diabetes Paternal Aunt      Social History     Socioeconomic History    Marital status:      Spouse name: Not on file    Number of children: Not on file    Years of education: Not on file    Highest education level: Not on file   Occupational History    Not on file   Tobacco Use    Smoking status: Current Every Day Smoker     Packs/day: 0.50     Years: 30.00     Pack years: 15.00    Smokeless tobacco: Never Used   Substance and Sexual Activity    Alcohol use: Not Currently     Alcohol/week: 0.8 standard drinks     Types: 1 Cans of beer per week    Drug use: No    Sexual activity: Not on file   Other Topics Concern     Service Not Asked    Blood Transfusions Not Asked    Caffeine Concern Not Asked    Occupational Exposure Not Asked   Areatha Seals Hazards Not Asked    Sleep Concern Not Asked    Stress Concern Not Asked    Weight Concern Not Asked    Special Diet Not Asked    Back Care Not Asked    Exercise Not Asked    Bike Helmet Not Asked   [address] Not Asked    Self-Exams Not Asked   Social History Narrative    Not on file     Social Determinants of Health     Financial Resource Strain:     Difficulty of Paying Living Expenses:    Food Insecurity:     Worried About Running Out of Food in the Last Year:     [address] in the Last Year:    Transportation Needs:     Lack of Transportation (Medical):      Lack of Transportation (Non-Medical):    Physical Activity:     Days of Exercise per Week:     Minutes of Exercise per Session:    Stress:     Feeling of Stress :    Social Connections:     Frequency of Communication with Friends and Family:     Frequency of Social Gatherings with Friends and Family:     Attends Congregational Services:     Active Member of Clubs or Organizations:     Attends Club or Organization Meetings:     Marital Status:    Intimate Partner Violence:     Fear of Current or Ex-Partner:     Emotionally Abused:     Physically Abused:     Sexually Abused:      Current Facility-Administered Medications   Medication Dose Route Frequency Provider Last Rate Last Admin    magnesium oxide (MAG-OX) tablet 400 mg  400 mg Oral BID Travis Mills MD   400 mg at 10/20/21 0854    0.9% sodium chloride with KCl 20 mEq/L infusion   IntraVENous CONTINUOUS Travis Mills MD 75 mL/hr at 10/19/21 2200 New Bag at 10/19/21 2200    gabapentin (NEURONTIN) capsule 300 mg  300 mg Oral TID Travis Mills MD   300 mg at 10/20/21 0854    HYDROcodone-acetaminophen (NORCO)  mg tablet 1 Tablet  1 Tablet Oral Q6H PRN Travis Mills MD   1 Tablet at 10/20/21 0906    mirtazapine (REMERON) tablet 15 mg  15 mg Oral QHS Travis Mills MD   15 mg at 10/19/21 2210    morphine injection 4 mg  4 mg IntraVENous Q6H PRN Izaiah Ariza NP        morphine CR (MS CONTIN) tablet 30 mg  30 mg Oral Q12H Chris Toiherbert Delatorre NP   30 mg at 10/20/21 0855    naloxone (NARCAN) injection 0.4 mg  0.4 mg IntraVENous PRN Izaiah Ariza NP        acetaminophen (TYLENOL) tablet 650 mg  650 mg Oral Q6H PRN Rosette Ferguson MD        Or    acetaminophen (TYLENOL) suppository 650 mg  650 mg Rectal Q6H PRN oRsette Ferguson MD        polyethylene glycol (MIRALAX) packet 17 g  17 g Oral DAILY PRN Rosette Ferguson MD        ondansetron (ZOFRAN ODT) tablet 4 mg  4 mg Oral Q8H PRN Rosette Ferguson MD   4 mg at 10/20/21 0438    Or    ondansetron (ZOFRAN) injection 4 mg  4 mg IntraVENous Q6H PRN Rosette Ferguson MD   4 mg at 10/19/21 0056    enoxaparin (LOVENOX) injection 40 mg  40 mg SubCUTAneous Q24H Nadeen Lefort T, MD   40 mg at 10/19/21 2210    budesonide-formoterol (SYMBICORT) 80-4.5 mcg inhaler  2 Puff Inhalation BID RT Rosette Ferguson MD   2 Puff at 10/20/21 0809    dexAMETHasone (DECADRON) tablet 4 mg  4 mg Oral Q6H Nadeen Lefort T, MD   4 mg at 10/20/21 1209    fluticasone propionate (FLONASE) 50 mcg/actuation nasal spray 2 Spray  2 Spray Both Nostrils DAILY Rosette Ferguson MD        metoprolol tartrate (LOPRESSOR) tablet 12.5 mg  12.5 mg Oral BID Rosette Ferguson MD   12.5 mg at 10/20/21 0854    pantoprazole (PROTONIX) tablet 40 mg  40 mg Oral DAILY Rosette Ferguson MD   40 mg at 10/20/21 0856       OBJECTIVE:  Patient Vitals for the past 8 hrs:   BP Temp Pulse Resp SpO2   10/20/21 1241 111/71 98.3 °F (36.8 °C) 84 18 99 %   10/20/21 0811 -- -- -- -- 100 %   10/20/21 0758 130/82 98.1 °F (36.7 °C) 93 18 99 %     Temp (24hrs), Av.1 °F (36.7 °C), Min:97.5 °F (36.4 °C), Max:98.4 °F (36.9 °C)    No intake/output data recorded. Physical Exam:  Constitutional: Well developed, well nourished male in no acute distress, sitting comfortably in the hospital bed. HEENT: Normocephalic and atraumatic.  Oropharynx is clear, mucous membranes are moist. Extraocular muscles are intact. Sclerae anicteric. Neck supple without JVD. No thyromegaly present. Skin Warm and dry. No bruising and no rash noted. No erythema. No pallor. Respiratory +decreased on the right. Lungs are clear to auscultation bilaterally without wheezes, rales or rhonchi, normal air exchange without accessory muscle use. CVS Normal rate, regular rhythm and normal S1 and S2. No murmurs, gallops, or rubs. Abdomen Soft, nontender and nondistended, normoactive bowel sounds. No palpable mass. No hepatosplenomegaly. Neuro Grossly nonfocal with no obvious sensory or motor deficits. MSK Normal range of motion in general.  No edema and no tenderness. Psych Appropriate mood and affect. Labs:    Recent Results (from the past 24 hour(s))   CBC WITH AUTOMATED DIFF    Collection Time: 10/20/21  8:55 AM   Result Value Ref Range    WBC 11.0 4.3 - 11.1 K/uL    RBC 3.58 (L) 4.23 - 5.6 M/uL    HGB 8.4 (L) 13.6 - 17.2 g/dL    HCT 28.1 (L) 41.1 - 50.3 %    MCV 78.5 (L) 79.6 - 97.8 FL    MCH 23.5 (L) 26.1 - 32.9 PG    MCHC 29.9 (L) 31.4 - 35.0 g/dL    RDW 17.2 (H) 11.9 - 14.6 %    PLATELET 123 (H) 769 - 450 K/uL    MPV 8.9 (L) 9.4 - 12.3 FL    ABSOLUTE NRBC 0.02 0.0 - 0.2 K/uL    NEUTROPHILS 66 47 - 75 %    BAND NEUTROPHILS 8 (H) 0 - 6 %    LYMPHOCYTES 10 (L) 16 - 44 %    MONOCYTES 13 (H) 3 - 9 %    METAMYELOCYTES 1 %    MYELOCYTES 2 %    ABS. NEUTROPHILS 8.5 (H) 1.7 - 8.2 K/UL    ABS. LYMPHOCYTES 1.1 0.5 - 4.6 K/UL    ABS.  MONOCYTES 1.4 (H) 0.1 - 1.3 K/UL    RBC COMMENTS NORMOCYTIC/NORMOCHROMIC      PLATELET COMMENTS INCREASED      DF MANUAL     METABOLIC PANEL, COMPREHENSIVE    Collection Time: 10/20/21  8:55 AM   Result Value Ref Range    Sodium 136 136 - 145 mmol/L    Potassium 4.4 3.5 - 5.1 mmol/L    Chloride 102 98 - 107 mmol/L    CO2 27 21 - 32 mmol/L    Anion gap 7 7 - 16 mmol/L    Glucose 154 (H) 65 - 100 mg/dL    BUN 4 (L) 8 - 23 MG/DL    Creatinine 0.60 (L) 0.8 - 1.5 MG/DL    GFR est AA >60 >60 ml/min/1.73m2    GFR est non-AA >60 >60 ml/min/1.73m2    Calcium 9.9 8.3 - 10.4 MG/DL    Bilirubin, total 0.2 0.2 - 1.1 MG/DL    ALT (SGPT) 10 (L) 12 - 65 U/L    AST (SGOT) 15 15 - 37 U/L    Alk. phosphatase 72 50 - 136 U/L    Protein, total 7.9 6.3 - 8.2 g/dL    Albumin 2.2 (L) 3.2 - 4.6 g/dL    Globulin 5.7 (H) 2.3 - 3.5 g/dL    A-G Ratio 0.4 (L) 1.2 - 3.5     MAGNESIUM    Collection Time: 10/20/21  8:55 AM   Result Value Ref Range    Magnesium 1.6 (L) 1.8 - 2.4 mg/dL       Imaging:  [unfilled]    ASSESSMENT:  Problem List  Date Reviewed: 9/29/2021        Codes Class Noted    Brain metastasis ([facility] ICD-10-CM: C79.31  ICD-9-CM: 198.3  10/18/2021        Nausea and vomiting ICD-10-CM: R11.2  ICD-9-CM: 787.01  10/18/2021        Recurrent right pleural effusion ICD-10-CM: J90  ICD-9-CM: 511.9  10/6/2021        Severe protein-calorie malnutrition ([facility] (Chronic) ICD-10-CM: E43  ICD-9-CM: 262  10/1/2021        Listed for admission to hospital ICD-10-CM: Z75.1  ICD-9-CM: V63.2  9/29/2021        Dehydration ICD-10-CM: E86.0  ICD-9-CM: 276.51  9/23/2021        Intractable vomiting with nausea ICD-10-CM: R11.2  ICD-9-CM: 536.2  9/23/2021        Nausea ICD-10-CM: R11.0  ICD-9-CM: 787.02  9/23/2021        Hypokalemia ICD-10-CM: E87.6  ICD-9-CM: 276.8  9/23/2021        * (Principal) Small cell lung carcinoma, right (HCC) (Chronic) ICD-10-CM: C34.91  ICD-9-CM: 162.9  9/23/2021        Mediastinal mass ICD-10-CM: J98.59  ICD-9-CM: 786.6  8/16/2021        Shortness of breath ICD-10-CM: R06.02  ICD-9-CM: 786.05  8/16/2021        Axillary adenopathy ICD-10-CM: R59.0  ICD-9-CM: 785.6  8/16/2021        Adrenal nodule ([facility] ICD-10-CM: E27.8  ICD-9-CM: 255.8  8/16/2021            Mr. Alisa Cisneros is a 73 y.o. male admitted on 10/18/2021 with a primary diagnosis of The primary encounter diagnosis was Pleural effusion.  Diagnoses of Malignant neoplasm of lung, unspecified laterality, unspecified part of lung ([facility] Brain metastasis ([address] Dehydration, Hypokalemia, Intractable vomiting with nausea, Other chest pain, ACP (advance care planning), Nausea, Generalized abdominal pain, Encounter for palliative care, Severe protein-calorie malnutrition ([address] and Diarrhea, unspecified type were also pertinent to this visit. 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 He is a current tobacco smoker. He is a relatively new patient of Dr Andres Duncan seen for the first time 9/2021. He underwent CT scan on 8/12/21 for shortness of breath and noted right pleural effusion and large mediastinal mass as well as left adrenal nodule and left axillary node. He underwent right thoracentesis which was negative for malignant cells. PET scan showed axillary, mediastinal, RP and left perihilar adenopathy as well as destructive left lateral rib lesion and multiple bony metastatic sites including C7, C6, right scapula, ribs and left iliac bone. Biopsy of left axillary node showed SCLC. It was planned to start carbo/etoposide/tecentriq. However, he had limited performance status and he received first cycle of chemotherapy inpatient (no Tecentriq) and was discharged 10/3/21. Of note, he was recently re-admitted for recurrent pleural effusion and had 800cc on 10/7/21. Cytology not sent. However, it was planned for him to have Pleurx placed as an outpatient. Mr Iza Mansfield presented to the ED 10/18/21 with c/o generalized weakness, nausea, vomiting and diarrhea for the preceding week. It was noted that he had a recent MRI that showed brain mets and his phone was shut off and was therefore unable to be notified of the results. He was admitted for further management of nausea, vomiting, diarrhea and dehydration. Oncology asked to assume care of our patient.        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 Hx of recurrent pleural effusion  - No evidence of malignant cells on previous cytology  - Asymptomatic   - CXR with unchanged large right pleural effusion  - Was previously set up for outpatient Pleurx  10/20 Remains asymptomatic. Sats stable on RA. Nausea, vomiting, dehydration  - Possibly secondary to brain mets vs recent chemo  - Continue antiemetics  - Continue IVF  10/20 Good relief with Zofran. Will advance diet. Diarrhea  - C diff pending  - Can utilize antidiarrheals if negative   10/20 No further report of diarrhea. Resolved. Cancer-related pain  - PC following  10/20 On MS Contin. Encouraged to use Norco for breakthrough pain. Hyponatremia, mild and electrolyte abnormalities  - Monitor  - Replace per [facility] SOPs  10/20 Improving. Continue home meds  [facility] SOPs    Dispo - Potentially home within 1-2 days. Lab studies and imaging studies were personally reviewed. Pertinent old records were reviewed from [address] [place] [address] Rd. Case discussed with Dr. Robina Hoover. Thank you for allowing us to participate in the care of Mr. Gema Vigil Leola Saucedo PCRISTOPHER. [address] Hematology & Oncology  [address] [place] [address]  Office : [phone]  Fax : [phone]   I personally saw, exammed and counselled the patient, and discussed with NP, agree with above history/assessment/plan. 73 y. o.male patient's diagnosis of extensive stage small cell lung cancer with limited performance status, status post palliative bone radiation and 1 cycle of cisplatin/etoposide although he claimed he did not know he had lung cancer, admitted for nausea/vomiting/diarrhea, MRI showed brain metastasis, continue high-dose Decadron and consult radiation oncology, antiemetics, pain control and supportive care as above.        Kate Carey M.D.   [address]  Office : [phone]  Fax : [phone]

## 2021-10-20 NOTE — PROGRESS NOTES
1840-END OF SHIFT NOTE:    -pt rested well throughout the shift  -vss, no needs voiced at this time     Intake/Output  10/20 0701 - 10/20 1900  In: 323 [I.V.:323]  Out: -  pt not measuring   Voiding: YES  Catheter: NO  Drain:        Stool:  0 occurrences. Stool Assessment  Stool Color: Other (Comment) (pt flushed) (10/18/21 2156)  Stool Appearance: Loose (10/19/21 1930)  Stool Amount: Small (10/18/21 2130)  Stool Source/Status: Rectum; Incontinence (10/18/21 2130)    Emesis:  0 occurrences. VITAL SIGNS  Patient Vitals for the past 12 hrs:   Temp Pulse Resp BP SpO2   10/20/21 1523 98.8 °F (37.1 °C) 98 18 130/73 99 %   10/20/21 1241 98.3 °F (36.8 °C) 84 18 111/71 99 %   10/20/21 0811 -- -- -- -- 100 %   10/20/21 0758 98.1 °F (36.7 °C) 93 18 130/82 99 %       Pain Assessment  Pain 1  Pain Scale 1: Numeric (0 - 10) (10/20/21 1445)  Pain Intensity 1: 0 (10/20/21 1445)  Patient Stated Pain Goal: 0 (10/20/21 1445)  Pain Reassessment 1: Yes (10/20/21 1010)  Pain Onset 1: pta (10/19/21 0509)  Pain Location 1: Back (10/20/21 0901)  Pain Orientation 1: Upper (10/20/21 0901)  Pain Description 1: Aching (10/20/21 0901)  Pain Intervention(s) 1: Medication (see MAR) (10/20/21 0901)    Ambulating  Yes    Additional Information:     Shift report given to oncoming nurse at the bedside.     Brett Schroeder RN

## 2021-10-20 NOTE — PROGRESS NOTES
Care Management Interventions  PCP Verified by CM: Yes  Support Systems: No Support Systems  Discharge Location  Discharge Placement: Unable to determine at this time    SW spoke w/ pt., pt stated that he is not feeling well at this time and waiting to speak w/ his sister who lives in [facility], pt's sister told him a few weeks ago that she could come help him whenever he is not feeling well.  SW will follow-up w/ pt    RADHA Al

## 2021-10-20 NOTE — PROGRESS NOTES
Attempted to call sister [place] to update and no answer. Left brief message notifying that I will attempt to call again tomorrow. Called patient's mother but she is asking that we update Agustina.              Casey Woods  Hematology & Oncology  [address] [address]  Office : [phone]  Fax : [phone]

## 2021-10-20 NOTE — PROGRESS NOTES
END OF SHIFT NOTE:    Intake/Output  10/19 1901 - 10/20 0700  In: 2022 [P.O.:920; I.V.:1102]  Out: 1300 [Urine:1300]   Voiding: YES  Catheter: NO  Drain:              Stool:  0 occurrences. Stool Assessment  Stool Color: Other (Comment) (pt flushed) (10/18/21 2156)  Stool Appearance: Loose (10/19/21 1930)  Stool Amount: Small (10/18/21 2130)  Stool Source/Status: Rectum; Incontinence (10/18/21 2130)    Emesis:  0 occurrences. VITAL SIGNS  Patient Vitals for the past 12 hrs:   Temp Pulse Resp BP SpO2   10/20/21 0328 97.8 °F (36.6 °C) 99 16 120/81 100 %   10/19/21 2323 98.4 °F (36.9 °C) 90 15 108/70 100 %   10/19/21 2001 -- -- -- -- 100 %   10/19/21 2000 97.5 °F (36.4 °C) 93 16 108/78 100 %       Pain Assessment  Pain 1  Pain Scale 1: Visual (10/20/21 0328)  Pain Intensity 1: 0 (10/20/21 0328)  Patient Stated Pain Goal: 0 (10/20/21 0328)  Pain Reassessment 1: Yes (10/19/21 0546)  Pain Onset 1: pta (10/19/21 0509)  Pain Location 1: Abdomen (10/19/21 0509)  Pain Orientation 1: Mid (10/19/21 0509)  Pain Description 1: Aching (10/19/21 0509)  Pain Intervention(s) 1: Medication (see MAR); Environmental changes (10/19/21 0509)    Ambulating  Yes    Additional Information:     Pt received PRN Zofran for nausea. Shift report given to oncoming nurse at the bedside.     Urbano Rebolledo, RN

## 2021-10-20 NOTE — PROGRESS NOTES
Comprehensive Nutrition Assessment    Type and Reason for Visit: Initial, Positive nutrition screen  Best Practice Alert for Malnutrition Screening Tool: Recently Lost Weight Without Trying: Yes, If Yes, How Much Weight Loss: >33 lbs, Eating Poorly Due to Decreased Appetite: Yes    Nutrition Recommendations/Plan:   Meals and Snacks:  Continue current diet. Nutrition Supplement Therapy:   Medical food supplement therapy:  Change Ensure Clear to Ensure Enlive three times per day (this provides 350 kcal and 20 grams protein per bottle)    Nursing miscellaneous order to record all meal and supplement intakes even if 0%.  Nursing miscellaneous order to provide po meds with Ensure Enlive as appropriate to increase kcal and protein intake during the day. Malnutrition Assessment:  Malnutrition Status: Severe malnutrition  Context: Chronic illness  Findings of clinical characteristics of malnutrition:   Energy Intake:  Mild decrease in energy intake (specify) (pt states poor po intake since 6/2021; unable to quantify)  Weight Loss:  7 - Greater than 5% over 1 month (177lbs at oncology office visit 9/2/21; 25% wt loss x1 month)     Body Fat Loss:  7 - Severe body fat loss, Orbital, Triceps   Muscle Mass Loss:  7 - Severe muscle mass loss, Calf (gastrocnemius), Hand (interosseous), Scapula (trapezius), Thigh (quadriceps), Temples (temporalis)  Fluid Accumulation:  Unable to assess,     Strength:  Not performed     Nutrition Assessment:   Nutrition History: Pt reports ongoing poor po intake since June 2021. Pt reports on drinking 1 Ensure plus and water for 3-4 days PTA due to ongoing N/V. Per RD note 9/30, pt had chest pain the end of May which negatively impacted appetite. He reports having difficulty with fatigue at home which impacts his ability to make meals. He also states he is unable to go to the grocery store as well.  Ordering groceries with a delivery service is not an options due to pt having blurred vision in right eye and blindness in left eye per pt. Note pt weighed 177lbs during oncology office visit 9/2 indicating a severe wt loss of 25% in 1 month. 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 Pt is admitted due to N/V/D and dehydration. Daily Update:  Pt seen resting in bed. He denies any further N/V since admission. Pt reports tolerating and eating 100% of clear liquid meal this AM. Noted diet advanced to regular. Discussed changing Ensure Clear to Ensure Enlive. Noted unopened Ensure Clear that pt states he's saving for when he feels hungry again later. Pt denies need for feeding assistance. Noted PC following and pt recently made DNR.     Current Nutrition Therapies:  ADULT ORAL NUTRITION SUPPLEMENT Breakfast, Lunch, Dinner; Clear Liquid  ADULT DIET Regular    Current Intake:   Average Meal Intake: Unable to assess (clear liquid diet; pt states eating 100%) Average Supplement Intake: 0%      Anthropometric Measures:  Height: 5' 11\" (180.3 cm)  Current Body Wt: 60.6 kg (133 lb 9.6 oz) (10/18), Weight source: Standing scale  BMI: 18.6, Underweight (BMI less than 22) age over 72  Admission Body Weight: 133 lb 9.6 oz (10/18; standing scale)  Ideal Body Weight (lbs) (Calculated): 172 lbs (78 kg), 77.7 %  Usual Body Wt: 80.3 kg (177 lb) (9/2 oncology office visit), Percent weight change: -24.5          Edema: No data recorded   Estimated Daily Nutrient Needs:  Energy (kcal/day): 1357-9402 (Kcal/kg (30-35), Weight Used: Current (60.6kg))  Protein (g/day): 73-91 (1.2-1.5gm/kg) Weight Used: (Current (60.6kg))  Fluid (ml/day):   (1 ml/kcal)    Nutrition Diagnosis:   · Inadequate oral intake related to  (fatigue and loss of appetite) as evidenced by  (pt states barrier to intake; N/V PTA)    · Severe malnutrition, In context of chronic illness related to inadequate protein-energy intake as evidenced by  (criteria provided in malnutrition assessment above)    Nutrition Interventions:   Food and/or Nutrient Delivery: Continue current diet, Modify oral nutrition supplement     Coordination of Nutrition Care: Continue to monitor while inpatient  Plan of Care discussed with RICH Bee    Goals: Active Goal: Meet >50% estimated nutrition needs by RD follow up. Nutrition Monitoring and Evaluation:      Food/Nutrient Intake Outcomes: Food and nutrient intake, Supplement intake  Physical Signs/Symptoms Outcomes: Biochemical data, GI status, Meal time behavior, Weight    Discharge Planning:     Too soon to determine    Electronically signed by Jovanna Ortiz MS, RDN, LD 10/20/2021 at 4:47 PM  Contact: [phone]    Disaster Mode Active

## 2021-10-20 NOTE — NURSE NAVIGATOR
Consult/sim brain mets. Pt arrived via transport on stretcher from [facility] room 351. Pt arrived alone for consult. He states he lives alone and has sister in [place] that calls him. Pt c/o headaches and nausea. No pain or nausea at present. He states he is walking a little while inpatient and C/O  Dizziness at times. He denies diarrhea. CONSENTS SIGNED FOR BRAIN RT.  CT SIM TODAY. RT TO START TOMORROW X 10 FRACTIONS.     Ashwin Tuttle RN

## 2021-10-21 NOTE — PROGRESS NOTES
Problem: Falls - Risk of  Goal: *Absence of Falls  Description: Document Leigh Ann Barron Fall Risk and appropriate interventions in the flowsheet.   Outcome: Progressing Towards Goal  Note: Fall Risk Interventions:  Mobility Interventions: Communicate number of staff needed for ambulation/transfer, Patient to call before getting OOB, Bed/chair exit alarm         Medication Interventions: Evaluate medications/consider consulting pharmacy, Patient to call before getting OOB, Teach patient to arise slowly, Bed/chair exit alarm    Elimination Interventions: Call light in reach, Patient to call for help with toileting needs, Toilet paper/wipes in reach, Urinal in reach, Bed/chair exit alarm    History of Falls Interventions: Consult care management for discharge planning, Evaluate medications/consider consulting pharmacy, Investigate reason for fall, Door open when patient unattended, Room close to nurse's station, Bed/chair exit alarm

## 2021-10-21 NOTE — PROGRESS NOTES
Physical and Occupational therapy note: received order for therapy, pt off floor for radiation. Will attempt at a later time. Thanks.  Octavio Antonio, PT

## 2021-10-21 NOTE — PROGRESS NOTES
END OF SHIFT NOTE:    Intake/Output  10/21 0701 - 10/21 1900  In: 0206 [P.O.:960; I.V.:225]  Out: -    Voiding: YES  Catheter: NO  Drain:              Stool:  0 occurrences. Stool Assessment  Stool Color: Other (Comment) (pt flushed) (10/18/21 2156)  Stool Appearance: Loose (10/19/21 1930)  Stool Amount: Small (10/18/21 2130)  Stool Source/Status: Rectum; Incontinence (10/18/21 2130)    Emesis:  0 occurrences. VITAL SIGNS  Patient Vitals for the past 12 hrs:   Temp Pulse Resp BP SpO2   10/21/21 1529 98.5 °F (36.9 °C) 100 18 121/88 100 %   10/21/21 1318 98 °F (36.7 °C) 89 18 125/68 95 %   10/21/21 0845 -- -- -- -- 100 %   10/21/21 0753 98.4 °F (36.9 °C) 100 18 137/68 100 %       Pain Assessment  Pain 1  Pain Scale 1: Numeric (0 - 10) (10/21/21 1340)  Pain Intensity 1: 0 (10/21/21 1340)  Patient Stated Pain Goal: 0 (10/21/21 1340)  Pain Reassessment 1: Yes (10/20/21 1010)  Pain Onset 1: pta (10/19/21 0509)  Pain Location 1: Back (10/20/21 0901)  Pain Orientation 1: Upper (10/20/21 0901)  Pain Description 1: Aching (10/20/21 0901)  Pain Intervention(s) 1: Medication (see MAR) (10/20/21 0901)    Ambulating  Yes    Additional Information:     - Pain meds inc frequency to q8.  Pt reports better control of pain  - Pre med with morphine before radiation  - afebrile, no n/v/d    Dave Ocampo RN

## 2021-10-21 NOTE — PROGRESS NOTES
Advance Care Planning   Advance Care Planning Inpatient Note  [address] [facility] Department    Today's Date: 10/21/2021  Unit: [facility]M    Received request from IDT member. Upon review of chart and communication with care team, patient's decision making abilities are not in question. Patient was/were present in the room during visit. Goals of ACP Conversation:  Discuss Advance Care planning documents    Health Care Decision Makers:      Primary Decision Maker: Mariluz Ojeda - [facility] - [phone]      Summary:  Completed New Documents    Advance Care Planning Documents (Patient Wishes) on file:  Healthcare Power of /Advance Directive appointment of Health care agent     Assessment:    Assisted patient in completing HCPOA. Copy placed on chart and original returned with additional copies.     Interventions:  Assisted in the completion of documents according to patient's wishes at this time    Care Preferences Communicated:  No    Outcomes/Plan:  New Advance Directive completed    Angela Murrell on 10/21/2021 at 9:05 AM

## 2021-10-21 NOTE — PROGRESS NOTES
[facility] Hematology & Oncology        Inpatient Hematology / Oncology Progress Note    Reason for Admission:  Nausea and vomiting [R11.2]    24 Hour Events:  Afebrile, VSS  Diarrhea resolved  XRT to begin today  Dr. Torin Patel updated sister via phone during rounds    Transfusions: None  Replacements: Mg+    ROS:  Constitutional: +weakness. Negative for fever, chills. CV: Negative for chest pain, palpitations, edema. Respiratory: Negative for dyspnea, cough, wheezing. GI: +nausea. Negative for abdominal pain, diarrhea. 10 point review of systems is otherwise negative with the exception of the elements mentioned above in the HPI.        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, blind in left eye,  deaf in left ear,\"every bone in skull was broken\"     Past Surgical History:   Procedure Laterality Date    HX APPENDECTOMY      HX HEENT  9/2009    facial reconstruction post trauma     Family History   Problem Relation Age of Onset    Diabetes Maternal Aunt     Diabetes Paternal Aunt      Social History     Socioeconomic History    Marital status:      Spouse name: Not on file    Number of children: Not on file    Years of education: Not on file    Highest education level: Not on file   Occupational History    Not on file   Tobacco Use    Smoking status: Current Every Day Smoker     Packs/day: 0.50     Years: 30.00     Pack years: 15.00    Smokeless tobacco: Never Used   Substance and Sexual Activity    Alcohol use: Not Currently     Alcohol/week: 0.8 standard drinks     Types: 1 Cans of beer per week    Drug use: No    Sexual activity: Not on file   Other Topics Concern     Service Not Asked    Blood Transfusions Not Asked    Caffeine Concern Not Asked    Occupational Exposure Not Asked   Lorenzo Graver Hazards Not Asked    Sleep Concern Not Asked    Stress Concern Not Asked    Weight Concern Not Asked    Special Diet Not Asked    Back Care Not Asked    Exercise Not Asked    Bike Helmet Not Asked   [address] Not Asked    Self-Exams Not Asked   Social History Narrative    Not on file     Social Determinants of Health     Financial Resource Strain:     Difficulty of Paying Living Expenses:    Food Insecurity:     Worried About Running Out of Food in the Last Year:     [address] in the Last Year:    Transportation Needs:     Lack of Transportation (Medical):      Lack of Transportation (Non-Medical):    Physical Activity:     Days of Exercise per Week:     Minutes of Exercise per Session:    Stress:     Feeling of Stress :    Social Connections:     Frequency of Communication with Friends and Family:     Frequency of Social Gatherings with Friends and Family:     Attends Islam Services:     Active Member of Clubs or Organizations:     Attends Club or Organization Meetings:     Marital Status:    Intimate Partner Violence:     Fear of Current or Ex-Partner:     Emotionally Abused:     Physically Abused:     Sexually Abused:      Current Facility-Administered Medications   Medication Dose Route Frequency Provider Last Rate Last Admin    magnesium oxide (MAG-OX) tablet 400 mg  400 mg Oral TID Vanessa Ulloa MD   400 mg at 10/21/21 0827    0.9% sodium chloride infusion  75 mL/hr IntraVENous CONTINUOUS Ольга Holt NP 75 mL/hr at 10/21/21 0826 75 mL/hr at 10/21/21 0826    gabapentin (NEURONTIN) capsule 300 mg  300 mg Oral TID Garrett Pedroza MD   300 mg at 10/21/21 0826    HYDROcodone-acetaminophen (NORCO)  mg tablet 1 Tablet  1 Tablet Oral Q6H PRN Garrett Pedroza MD   1 Tablet at 10/20/21 1715    mirtazapine (REMERON) tablet 15 mg  15 mg Oral QHS Garrett Pedroza MD   15 mg at 10/20/21 2124    morphine injection 4 mg  4 mg IntraVENous Q6H PRN Veronique Peacock NP        morphine CR (MS CONTIN) tablet 30 mg  30 mg Oral Q12H Alex Perez NP   30 mg at 10/21/21 08    naloxone (NARCAN) injection 0.4 mg  0.4 mg IntraVENous PRN Veronique Peacock NP        acetaminophen (TYLENOL) tablet 650 mg  650 mg Oral Q6H PRN Chiquita Corona MD        Or    acetaminophen (TYLENOL) suppository 650 mg  650 mg Rectal Q6H PRN Chiquita Corona MD        polyethylene glycol (MIRALAX) packet 17 g  17 g Oral DAILY PRN Chiquita Corona MD   17 g at 10/21/21 08    ondansetron (ZOFRAN ODT) tablet 4 mg  4 mg Oral Q8H PRN Chiquita Corona MD   4 mg at 10/20/21 0438    Or    ondansetron (ZOFRAN) injection 4 mg  4 mg IntraVENous Q6H PRN Chiquita Corona MD   4 mg at 10/19/21 0056    enoxaparin (LOVENOX) injection 40 mg  40 mg SubCUTAneous Q24H Irais ESQUIVEL MD   40 mg at 10/20/21 2129    budesonide-formoterol (SYMBICORT) 80-4.5 mcg inhaler  2 Puff Inhalation BID RT Chiquita Corona MD   2 Puff at 10/20/21 1953    dexAMETHasone (DECADRON) tablet 4 mg  4 mg Oral Q6H Chiquita Corona MD   4 mg at 10/21/21 0509    fluticasone propionate (FLONASE) 50 mcg/actuation nasal spray 2 Spray  2 Spray Both Nostrils DAILY Chiquita Corona MD   2 Spray at 10/21/21 08    metoprolol tartrate (LOPRESSOR) tablet 12.5 mg  12.5 mg Oral BID Chiquita Corona MD   12.5 mg at 10/21/21 0827    pantoprazole (PROTONIX) tablet 40 mg  40 mg Oral DAILY Chiquita Corona MD   40 mg at 10/21/21 0827       OBJECTIVE:  Patient Vitals for the past 8 hrs:   BP Temp Pulse Resp SpO2   10/21/21 0753 137/68 98.4 °F (36.9 °C) 100 18 100 %   10/21/21 0500 121/86 98.7 °F (37.1 °C) 89 17 97 %     Temp (24hrs), Av.4 °F (36.9 °C), Min:98.1 °F (36.7 °C), Max:98.8 °F (37.1 °C)    No intake/output data recorded. Physical Exam:  Constitutional: Well developed, well nourished male in no acute distress, sitting comfortably in the hospital bed. HEENT: Normocephalic and atraumatic. Oropharynx is clear, mucous membranes are moist. Extraocular muscles are intact. Sclerae anicteric. Neck supple without JVD. No thyromegaly present. Skin Warm and dry. No bruising and no rash noted. No erythema. No pallor. Respiratory +decreased on the right. Lungs are clear to auscultation bilaterally without wheezes, rales or rhonchi, normal air exchange without accessory muscle use. CVS Normal rate, regular rhythm and normal S1 and S2. No murmurs, gallops, or rubs. Abdomen Soft, nontender and nondistended, normoactive bowel sounds. No palpable mass. No hepatosplenomegaly. Neuro Grossly nonfocal with no obvious sensory or motor deficits. MSK Normal range of motion in general.  No edema and no tenderness. Psych Appropriate mood and affect. Labs:    Recent Results (from the past 24 hour(s))   CBC WITH AUTOMATED DIFF    Collection Time: 10/20/21  8:55 AM   Result Value Ref Range    WBC 11.0 4.3 - 11.1 K/uL    RBC 3.58 (L) 4.23 - 5.6 M/uL    HGB 8.4 (L) 13.6 - 17.2 g/dL    HCT 28.1 (L) 41.1 - 50.3 %    MCV 78.5 (L) 79.6 - 97.8 FL    MCH 23.5 (L) 26.1 - 32.9 PG    MCHC 29.9 (L) 31.4 - 35.0 g/dL    RDW 17.2 (H) 11.9 - 14.6 %    PLATELET 178 (H) 507 - 450 K/uL    MPV 8.9 (L) 9.4 - 12.3 FL    ABSOLUTE NRBC 0.02 0.0 - 0.2 K/uL    NEUTROPHILS 66 47 - 75 %    BAND NEUTROPHILS 8 (H) 0 - 6 %    LYMPHOCYTES 10 (L) 16 - 44 %    MONOCYTES 13 (H) 3 - 9 %    METAMYELOCYTES 1 %    MYELOCYTES 2 %    ABS. NEUTROPHILS 8.5 (H) 1.7 - 8.2 K/UL    ABS. LYMPHOCYTES 1.1 0.5 - 4.6 K/UL    ABS.  MONOCYTES 1.4 (H) 0.1 - 1.3 K/UL    RBC COMMENTS NORMOCYTIC/NORMOCHROMIC      PLATELET COMMENTS INCREASED      DF MANUAL     METABOLIC PANEL, COMPREHENSIVE    Collection Time: 10/20/21  8:55 AM   Result Value Ref Range    Sodium 136 136 - 145 mmol/L    Potassium 4.4 3.5 - 5.1 mmol/L    Chloride 102 98 - 107 mmol/L    CO2 27 21 - 32 mmol/L    Anion gap 7 7 - 16 mmol/L    Glucose 154 (H) 65 - 100 mg/dL    BUN 4 (L) 8 - 23 MG/DL    Creatinine 0.60 (L) 0.8 - 1.5 MG/DL    GFR est AA >60 >60 ml/min/1.73m2    GFR est non-AA >60 >60 ml/min/1.73m2    Calcium 9.9 8.3 - 10.4 MG/DL    Bilirubin, total 0.2 0.2 - 1.1 MG/DL    ALT (SGPT) 10 (L) 12 - 65 U/L    AST (SGOT) 15 15 - 37 U/L    Alk. phosphatase 72 50 - 136 U/L    Protein, total 7.9 6.3 - 8.2 g/dL    Albumin 2.2 (L) 3.2 - 4.6 g/dL    Globulin 5.7 (H) 2.3 - 3.5 g/dL    A-G Ratio 0.4 (L) 1.2 - 3.5     MAGNESIUM    Collection Time: 10/20/21  8:55 AM   Result Value Ref Range    Magnesium 1.6 (L) 1.8 - 2.4 mg/dL   CBC WITH AUTOMATED DIFF    Collection Time: 10/21/21  3:03 AM   Result Value Ref Range    WBC 13.0 (H) 4.3 - 11.1 K/uL    RBC 3.07 (L) 4.23 - 5.6 M/uL    HGB 7.2 (L) 13.6 - 17.2 g/dL    HCT 23.9 (L) 41.1 - 50.3 %    MCV 77.9 (L) 79.6 - 97.8 FL    MCH 23.5 (L) 26.1 - 32.9 PG    MCHC 30.1 (L) 31.4 - 35.0 g/dL    RDW 17.2 (H) 11.9 - 14.6 %    PLATELET 079 (H) 209 - 450 K/uL    MPV 8.6 (L) 9.4 - 12.3 FL    ABSOLUTE NRBC 0.02 0.0 - 0.2 K/uL    NEUTROPHILS 69 47 - 75 %    BAND NEUTROPHILS 1 0 - 6 %    LYMPHOCYTES 10 (L) 16 - 44 %    MONOCYTES 11 (H) 3 - 9 %    METAMYELOCYTES 4 %    MYELOCYTES 4 %    PROMYELOCYTES 1 %    ABS. NEUTROPHILS 10.3 (H) 1.7 - 8.2 K/UL    ABS. LYMPHOCYTES 1.3 0.5 - 4.6 K/UL    ABS.  MONOCYTES 1.4 (H) 0.1 - 1.3 K/UL    RBC COMMENTS SLIGHT  POLYCHROMASIA        RBC COMMENTS MODERATE  HYPOCHROMIA        RBC COMMENTS SLIGHT  MACROCYTOSIS        RBC COMMENTS SLIGHT  ANISOCYTOSIS        WBC COMMENTS OCCASIONAL      PLATELET COMMENTS INCREASED      DF MANUAL     METABOLIC PANEL, COMPREHENSIVE    Collection Time: 10/21/21  3:03 AM   Result Value Ref Range    Sodium 136 136 - 145 mmol/L    Potassium 4.2 3.5 - 5.1 mmol/L    Chloride 102 98 - 107 mmol/L    CO2 29 21 - 32 mmol/L    Anion gap 5 (L) 7 - 16 mmol/L    Glucose 121 (H) 65 - 100 mg/dL    BUN 10 8 - 23 MG/DL    Creatinine 0.55 (L) 0.8 - 1.5 MG/DL    GFR est AA >60 >60 ml/min/1.73m2    GFR est non-AA >60 >60 ml/min/1.73m2    Calcium 9.9 8.3 - 10.4 MG/DL    Bilirubin, total 0.1 (L) 0.2 - 1.1 MG/DL    ALT (SGPT) 17 12 - 65 U/L    AST (SGOT) 25 15 - 37 U/L    Alk. phosphatase 72 50 - 136 U/L    Protein, total 7.2 6.3 - 8.2 g/dL    Albumin 1.9 (L) 3.2 - 4.6 g/dL    Globulin 5.3 (H) 2.3 - 3.5 g/dL    A-G Ratio 0.4 (L) 1.2 - 3.5     MAGNESIUM    Collection Time: 10/21/21  3:03 AM   Result Value Ref Range    Magnesium 1.6 (L) 1.8 - 2.4 mg/dL       Imaging:  XR CHEST PA LAT [[id number]] Collected: 10/18/21 1648   Order Status: Completed Updated: 10/18/21 1651   Narrative:     EXAMINATION: XR CHEST PA LAT 10/18/2021 4:47 PM     INDICATION: Generalized weakness for one week with nausea vomiting and diarrhea. Lung cancer. COMPARISON: Chest radiograph 10/7/2021     TECHNIQUE: PA and lateral views of the chest were obtained. FINDINGS:     Osseous: No acute abnormality. Cardiomediastinal Silhouette: Obscured     Lungs: Right lung consolidation is unchanged. No left lung abnormality. Pleura: Large right pleural effusion is unchanged. No pneumothorax. No left   pleural abnormality. Upper Abdomen: No abnormality.     Impression:     Unchanged large right pleural effusion from 10/7/2021          ASSESSMENT:  Problem List  Date Reviewed: 9/29/2021        Codes Class Noted    Brain metastasis ([facility] ICD-10-CM: C79.31  ICD-9-CM: 198.3  10/18/2021        Nausea and vomiting ICD-10-CM: R11.2  ICD-9-CM: 787.01  10/18/2021        Recurrent right pleural effusion ICD-10-CM: J90  ICD-9-CM: 511.9  10/6/2021        Severe protein-calorie malnutrition ([facility] (Chronic) ICD-10-CM: E43  ICD-9-CM: 262  10/1/2021        Listed for admission to hospital ICD-10-CM: Z75.1  ICD-9-CM: V63.2  9/29/2021        Dehydration ICD-10-CM: E86.0  ICD-9-CM: 276.51  9/23/2021        Intractable vomiting with nausea ICD-10-CM: R11.2  ICD-9-CM: 536.2  9/23/2021        Nausea ICD-10-CM: R11.0  ICD-9-CM: 787.02  9/23/2021        Hypokalemia ICD-10-CM: E87.6  ICD-9-CM: 276.8  9/23/2021        * (Principal) Small cell lung carcinoma, right (HCC) (Chronic) ICD-10-CM: C34.91  ICD-9-CM: 162.9  9/23/2021        Mediastinal mass ICD-10-CM: J98.59  ICD-9-CM: 786.6  8/16/2021        Shortness of breath ICD-10-CM: R06.02  ICD-9-CM: 786.05  8/16/2021        Axillary adenopathy ICD-10-CM: R59.0  ICD-9-CM: 785.6  8/16/2021        Adrenal nodule ([facility] ICD-10-CM: E27.8  ICD-9-CM: 255.8  8/16/2021            Mr. Glenys Delatorre is a 73 y.o. male admitted on 10/18/2021 with a primary diagnosis of The primary encounter diagnosis was Pleural effusion. Diagnoses of Malignant neoplasm of lung, unspecified laterality, unspecified part of lung ([address] Brain metastasis ([address] Dehydration, Hypokalemia, Intractable vomiting with nausea, Other chest pain, ACP (advance care planning), Nausea, Generalized abdominal pain, Encounter for palliative care, Severe protein-calorie malnutrition ([address] and Diarrhea, unspecified type were also pertinent to this visit. 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 He is a current tobacco smoker. He is a relatively new patient of Dr Natalee Butt seen for the first time 9/2021. He underwent CT scan on 8/12/21 for shortness of breath and noted right pleural effusion and large mediastinal mass as well as left adrenal nodule and left axillary node. He underwent right thoracentesis which was negative for malignant cells. PET scan showed axillary, mediastinal, RP and left perihilar adenopathy as well as destructive left lateral rib lesion and multiple bony metastatic sites including C7, C6, right scapula, ribs and left iliac bone. Biopsy of left axillary node showed SCLC. It was planned to start carbo/etoposide/tecentriq. However, he had limited performance status and he received first cycle of chemotherapy inpatient (no Tecentriq) and was discharged 10/3/21.  Of note, he was recently re-admitted for recurrent pleural effusion and had 800cc on 10/7/21. Cytology not sent. However, it was planned for him to have Pleurx placed as an outpatient. Mr Nurys Tovar presented to the ED 10/18/21 with c/o generalized weakness, nausea, vomiting and diarrhea for the preceding week. It was noted that he had a recent MRI that showed brain mets and his phone was shut off and was therefore unable to be notified of the results. He was admitted for further management of nausea, vomiting, diarrhea and dehydration. Oncology asked to assume care of our patient. 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 10/21 XRT to begin today, 1 of 10 fx. Hx of recurrent pleural effusion  - No evidence of malignant cells on previous cytology  - Asymptomatic   - CXR with unchanged large right pleural effusion  - Was previously set up for outpatient Pleurx  10/20 Remains asymptomatic. Sats stable on RA. Nausea, vomiting, dehydration  - Possibly secondary to brain mets vs recent chemo  - Continue antiemetics  - Continue IVF  10/20 Good relief with Zofran. Will advance diet. Diarrhea  - C diff pending  - Can utilize antidiarrheals if negative   10/20 No further report of diarrhea. Resolved. 10/21 No further episodes of diarrhea. Cdiff canceled. Cancer-related pain  - PC following  10/20 On MS Contin. Encouraged to use Norco for breakthrough pain. 10/21 Pt c/o MS Contin only lasting around 8 hours. Requests increase - ok to increase to q 8 hours. Hyponatremia, mild electrolyte abnormalities  - Monitor  - Replace per [facility] SOPs  10/20 Improving. 10/21 Na+ 136. Replete Mg+. CCa++ 11.6, IVF ordered. Continue home meds  [facility] SOPs  Lovenox for DVT ppx    Goals and plan of care reviewed with the patient. All questions answered to the best of our ability. Disposition: Anticipate discharge tomorrow. Updated sister via phone during rounds. Sister states pt does not have anyone to stay at home with him, but they could check on him periodically. Consult PT/OT for eval.              Paulo Taylor NP   [facility] Hematology & Oncology  [address] [address]  Office : [phone]  Fax : [phone]   I personally saw, exammed and counselled the patient, and discussed with NP, agree with above history/assessment/plan. 66 y.o. male patient's diagnosis of extensive stage small cell lung cancer with limited performance status, status post palliative bone radiation and 1 cycle of cisplatin/etoposide although he claimed he did not know he had lung cancer, admitted for nausea/vomiting/diarrhea, MRI showed brain metastasis, continue high-dose Decadron and consult radiation oncology, antiemetics, pain control and supportive care, updated sister over the phone, PT evaluation from placement needs, continue care as above.        Daniel Camacho M.D.   [address]  Office : [phone]  Fax : [phone]

## 2021-10-22 NOTE — PROGRESS NOTES
Pt complained of hiccups, states that when he get too many hiccups at the same time, feels sob. Pt requested to be put on oxygen and medication for hiccups. Put pt on 2L via NC and obtained order for 5mg baclofen po per opal sops protocol.

## 2021-10-22 NOTE — PROGRESS NOTES
Care Management Interventions  PCP Verified by CM:  Yes  Support Systems: No Support Systems  Discharge Location  Discharge Placement: Unable to determine at this time    SW spoke w/ pt earlier and pt stated that he is going to [facility], [facility] [place] and [facility] is waitin g on pt's precert    3:91TB-HM spoke w/ NP, pt has changed his mind Re: his decision for rehab and would like to go home because of bills and other family issues, PT/OT is evaluating pt for home health services, SW will follow-up w/ pt    2:30pm-Pt declined PT home health services, SW will follow-up w/pt    RADHA Mclean

## 2021-10-22 NOTE — PROGRESS NOTES
[employer] [facility]        Inpatient Hematology / Oncology Progress Note    Reason for Admission:  Nausea and vomiting [R11.2]    24 Hour Events:  Afebrile, VSS, on RA  No c/o N/V/D overnight  Pain better controlled with increase of MS Contin  XRT 2/10 fx today  Awaiting PT/OT eval    Transfusions: None  Replacements: Mg+    ROS:  Constitutional: Negative for fever, chills. CV: Negative for chest pain, palpitations, edema. Respiratory: Negative for dyspnea, cough, wheezing. GI: Negative for nausea, abdominal pain, diarrhea. 10 point review of systems is otherwise negative with the exception of the elements mentioned above in the HPI.        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, blind in left eye,  deaf in left ear,\"every bone in skull was broken\"     Past Surgical History:   Procedure Laterality Date    HX APPENDECTOMY      HX HEENT  9/2009    facial reconstruction post trauma     Family History   Problem Relation Age of Onset    Diabetes Maternal Aunt     Diabetes Paternal Aunt      Social History     Socioeconomic History    Marital status:      Spouse name: Not on file    Number of children: Not on file    Years of education: Not on file    Highest education level: Not on file   Occupational History    Not on file   Tobacco Use    Smoking status: Current Every Day Smoker     Packs/day: 0.50     Years: 30.00     Pack years: 15.00    Smokeless tobacco: Never Used   Substance and Sexual Activity    Alcohol use: Not Currently     Alcohol/week: 0.8 standard drinks     Types: 1 Cans of beer per week    Drug use: No    Sexual activity: Not on file   Other Topics Concern   [address] Service Not Asked    Blood Transfusions Not Asked    Caffeine Concern Not Asked    Occupational Exposure Not Asked    Hobby Hazards Not Asked    Sleep Concern Not Asked    Stress Concern Not Asked    Weight Concern Not Asked    Special Diet Not Asked    Back Care Not Asked    Exercise Not Asked    Bike Helmet Not Asked   [address] Not Asked    Self-Exams Not Asked   Social History Narrative    Not on file     Social Determinants of Health     Financial Resource Strain:     Difficulty of Paying Living Expenses:    Food Insecurity:     Worried About Running Out of Food in the Last Year:     [address] in the Last Year:    Transportation Needs:     Lack of Transportation (Medical):      Lack of Transportation (Non-Medical):    Physical Activity:     Days of Exercise per Week:     Minutes of Exercise per Session:    Stress:     Feeling of Stress :    Social Connections:     Frequency of Communication with Friends and Family:     Frequency of Social Gatherings with Friends and Family:     Attends Yazdanism Services:     Active Member of Clubs or Organizations:     Attends Club or Organization Meetings:     Marital Status:    Intimate Partner Violence:     Fear of Current or Ex-Partner:     Emotionally Abused:     Physically Abused:     Sexually Abused:      Current Facility-Administered Medications   Medication Dose Route Frequency Provider Last Rate Last Admin    magnesium sulfate 2 g/50 ml IVPB (premix or compounded)  2 g IntraVENous Freddy Phillips NP        magnesium oxide (MAG-OX) tablet 400 mg  400 mg Oral TID Ofelia Carrel, MD   400 mg at 10/21/21 2144    0.9% sodium chloride infusion  75 mL/hr IntraVENous CONTINUOUS Bhumi Alfaro NP 75 mL/hr at 10/21/21 0826 75 mL/hr at 10/21/21 0826    morphine CR (MS CONTIN) tablet 30 mg  30 mg Oral Q8H Bhumi Alfaro NP   30 mg at 10/22/21 0528    baclofen (LIORESAL) tablet 5 mg  5 mg Oral TID PRN Ofelia Carrel, MD   5 mg at 10/21/21 7177    gabapentin (NEURONTIN) capsule 300 mg  300 mg Oral TID Mariano Duncan MD   300 mg at 10/21/21 2144    HYDROcodone-acetaminophen (NORCO)  mg tablet 1 Tablet  1 Tablet Oral Q6H PRN Mariano Duncan MD   1 Tablet at 10/20/21 171    mirtazapine (REMERON) tablet 15 mg  15 mg Oral QHS Mariano Duncan MD   15 mg at 10/21/21 2144    morphine injection 4 mg  4 mg IntraVENous Q6H PRN Veronique Peacock NP   4 mg at 10/21/21 2002    naloxone [facility]) injection 0.4 mg  0.4 mg IntraVENous PRN Veronique Peacock NP        acetaminophen (TYLENOL) tablet 650 mg  650 mg Oral Q6H PRN Chiquita Corona MD        Or    acetaminophen (TYLENOL) suppository 650 mg  650 mg Rectal Q6H PRN Chiquita Corona MD        polyethylene glycol (MIRALAX) packet 17 g  17 g Oral DAILY PRN Chiquita Corona MD   17 g at 10/21/21 0826    ondansetron (ZOFRAN ODT) tablet 4 mg  4 mg Oral Q8H PRN Chiquita Corona MD   4 mg at 10/20/21 0438    Or    ondansetron (ZOFRAN) injection 4 mg  4 mg IntraVENous Q6H PRN Chiquita Corona MD   4 mg at 10/19/21 0056    enoxaparin (LOVENOX) injection 40 mg  40 mg SubCUTAneous Q24H Irais ESQUIVEL MD   40 mg at 10/21/21 2143    budesonide-formoterol (SYMBICORT) 80-4.5 mcg inhaler  2 Puff Inhalation BID RT Chiquita Corona MD   2 Puff at 10/21/21 204    dexAMETHasone (DECADRON) tablet 4 mg  4 mg Oral Q6H Chiquita Corona MD   4 mg at 10/22/21 0528    fluticasone propionate (FLONASE) 50 mcg/actuation nasal spray 2 Spray  2 Spray Both Nostrils DAILY Chiquita Corona MD   2 Spray at 10/21/21 0826    metoprolol tartrate (LOPRESSOR) tablet 12.5 mg  12.5 mg Oral BID Chiquita Corona MD   12.5 mg at 10/21/21 1706    pantoprazole (PROTONIX) tablet 40 mg  40 mg Oral DAILY Chiquita Corona MD   40 mg at 10/21/21 4582       OBJECTIVE:  Patient Vitals for the past 8 hrs:   BP Temp Pulse Resp SpO2   10/22/21 0303 139/84 97.8 °F (36.6 °C) 88 20 100 %     Temp (24hrs), Av.1 °F (36.7 °C), Min:97.8 °F (36.6 °C), Max:98.5 °F (36.9 °C)    No intake/output data recorded. Physical Exam:  Constitutional: Well developed, well nourished male in no acute distress, sitting comfortably in the hospital bed. HEENT: Normocephalic and atraumatic. Oropharynx is clear, mucous membranes are moist. Extraocular muscles are intact. Sclerae anicteric. Neck supple without JVD. No thyromegaly present. Skin Warm and dry. No bruising and no rash noted. No erythema. No pallor. Respiratory +decreased on the right. Lungs are clear to auscultation bilaterally without wheezes, rales or rhonchi, normal air exchange without accessory muscle use. CVS Normal rate, regular rhythm and normal S1 and S2. No murmurs, gallops, or rubs. Abdomen Soft, nontender and nondistended, normoactive bowel sounds. No palpable mass. No hepatosplenomegaly. Neuro Grossly nonfocal with no obvious sensory or motor deficits. MSK Normal range of motion in general.  No edema and no tenderness. Psych Appropriate mood and affect. Labs:    Recent Results (from the past 24 hour(s))   CBC WITH AUTOMATED DIFF    Collection Time: 10/22/21  5:27 AM   Result Value Ref Range    WBC 15.5 (H) 4.3 - 11.1 K/uL    RBC 3.50 (L) 4.23 - 5.6 M/uL    HGB 8.3 (L) 13.6 - 17.2 g/dL    HCT 26.7 (L) 41.1 - 50.3 %    MCV 76.3 (L) 79.6 - 97.8 FL    MCH 23.7 (L) 26.1 - 32.9 PG    MCHC 31.1 (L) 31.4 - 35.0 g/dL    RDW 17.4 (H) 11.9 - 14.6 %    PLATELET 780 (H) 151 - 450 K/uL    MPV 8.6 (L) 9.4 - 12.3 FL    ABSOLUTE NRBC 0.06 0.0 - 0.2 K/uL    NEUTROPHILS 78 (H) 47 - 75 %    LYMPHOCYTES 9 (L) 16 - 44 %    MONOCYTES 11 (H) 3 - 9 %    METAMYELOCYTES 1 %    MYELOCYTES 1 %    ABS. NEUTROPHILS 12.4 (H) 1.7 - 8.2 K/UL    ABS. LYMPHOCYTES 1.4 0.5 - 4.6 K/UL    ABS.  MONOCYTES 1.7 (H) 0.1 - 1.3 K/UL    RBC COMMENTS MODERATE  HYPOCHROMIA        RBC COMMENTS SLIGHT  POLYCHROMASIA        RBC COMMENTS SLIGHT  MACROCYTOSIS        RBC COMMENTS SLIGHT  ANISOCYTOSIS        PLATELET COMMENTS INCREASED      DF MANUAL METABOLIC PANEL, COMPREHENSIVE    Collection Time: 10/22/21  5:27 AM   Result Value Ref Range    Sodium 133 (L) 136 - 145 mmol/L    Potassium 4.4 3.5 - 5.1 mmol/L    Chloride 101 98 - 107 mmol/L    CO2 30 21 - 32 mmol/L    Anion gap 2 (L) 7 - 16 mmol/L    Glucose 98 65 - 100 mg/dL    BUN 13 8 - 23 MG/DL    Creatinine 0.44 (L) 0.8 - 1.5 MG/DL    GFR est AA >60 >60 ml/min/1.73m2    GFR est non-AA >60 >60 ml/min/1.73m2    Calcium 10.2 8.3 - 10.4 MG/DL    Bilirubin, total 0.1 (L) 0.2 - 1.1 MG/DL    ALT (SGPT) 35 12 - 65 U/L    AST (SGOT) 41 (H) 15 - 37 U/L    Alk. phosphatase 80 50 - 136 U/L    Protein, total 7.2 6.3 - 8.2 g/dL    Albumin 2.0 (L) 3.2 - 4.6 g/dL    Globulin 5.2 (H) 2.3 - 3.5 g/dL    A-G Ratio 0.4 (L) 1.2 - 3.5     MAGNESIUM    Collection Time: 10/22/21  5:27 AM   Result Value Ref Range    Magnesium 1.4 (L) 1.8 - 2.4 mg/dL       Imaging:  XR CHEST PA LAT [[id number]] Collected: 10/18/21 1648   Order Status: Completed Updated: 10/18/21 1651   Narrative:     EXAMINATION: XR CHEST PA LAT 10/18/2021 4:47 PM     INDICATION: Generalized weakness for one week with nausea vomiting and diarrhea. Lung cancer. COMPARISON: Chest radiograph 10/7/2021     TECHNIQUE: PA and lateral views of the chest were obtained. FINDINGS:     Osseous: No acute abnormality. Cardiomediastinal Silhouette: Obscured     Lungs: Right lung consolidation is unchanged. No left lung abnormality. Pleura: Large right pleural effusion is unchanged. No pneumothorax. No left   pleural abnormality. Upper Abdomen: No abnormality.     Impression:     Unchanged large right pleural effusion from 10/7/2021          ASSESSMENT:  Problem List  Date Reviewed: 9/29/2021        Codes Class Noted    Brain metastasis ([facility] ICD-10-CM: C79.31  ICD-9-CM: 198.3  10/18/2021        Nausea and vomiting ICD-10-CM: R11.2  ICD-9-CM: 787.01  10/18/2021        Recurrent right pleural effusion ICD-10-CM: J90  ICD-9-CM: 511.9  10/6/2021        Severe protein-calorie malnutrition ([facility] (Chronic) ICD-10-CM: E43  ICD-9-CM: 262  10/1/2021        Listed for admission to hospital ICD-10-CM: Z75.1  ICD-9-CM: V63.2  9/29/2021        Dehydration ICD-10-CM: E86.0  ICD-9-CM: 276.51  9/23/2021        Intractable vomiting with nausea ICD-10-CM: R11.2  ICD-9-CM: 536.2  9/23/2021        Nausea ICD-10-CM: R11.0  ICD-9-CM: 787.02  9/23/2021        Hypokalemia ICD-10-CM: E87.6  ICD-9-CM: 276.8  9/23/2021        * (Principal) Small cell lung carcinoma, right (HCC) (Chronic) ICD-10-CM: C34.91  ICD-9-CM: 162.9  9/23/2021        Mediastinal mass ICD-10-CM: J98.59  ICD-9-CM: 786.6  8/16/2021        Shortness of breath ICD-10-CM: R06.02  ICD-9-CM: 786.05  8/16/2021        Axillary adenopathy ICD-10-CM: R59.0  ICD-9-CM: 785.6  8/16/2021        Adrenal nodule ([facility] ICD-10-CM: E27.8  ICD-9-CM: 255.8  8/16/2021            Mr. Remington Carey is a 73 y.o. male admitted on 10/18/2021 with a primary diagnosis of The primary encounter diagnosis was Pleural effusion. Diagnoses of Malignant neoplasm of lung, unspecified laterality, unspecified part of lung ([facility] Brain metastasis ([facility] Dehydration, Hypokalemia, Intractable vomiting with nausea, Other chest pain, ACP (advance care planning), Nausea, Generalized abdominal pain, Encounter for palliative care, Severe protein-calorie malnutrition ([address] and Diarrhea, unspecified type were also pertinent to this visit. 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 He is a current tobacco smoker. He is a relatively new patient of Dr Joya Sever seen for the first time 9/2021. He underwent CT scan on 8/12/21 for shortness of breath and noted right pleural effusion and large mediastinal mass as well as left adrenal nodule and left axillary node. He underwent right thoracentesis which was negative for malignant cells.  PET scan showed axillary, mediastinal, RP and left perihilar adenopathy as well as destructive left lateral rib lesion and multiple bony metastatic sites including C7, C6, right scapula, ribs and left iliac bone. Biopsy of left axillary node showed SCLC. It was planned to start carbo/etoposide/tecentriq. However, he had limited performance status and he received first cycle of chemotherapy inpatient (no Tecentriq) and was discharged 10/3/21. Of note, he was recently re-admitted for recurrent pleural effusion and had 800cc on 10/7/21. Cytology not sent. However, it was planned for him to have Pleurx placed as an outpatient. Mr Pj Rico presented to the ED 10/18/21 with c/o generalized weakness, nausea, vomiting and diarrhea for the preceding week. It was noted that he had a recent MRI that showed brain mets and his phone was shut off and was therefore unable to be notified of the results. He was admitted for further management of nausea, vomiting, diarrhea and dehydration. Oncology asked to assume care of our patient. 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 10/21 XRT to begin today, 1 of 10 fx.  10/22 XRT, 2/10 fx. Con't Dex. Hx of recurrent pleural effusion  - No evidence of malignant cells on previous cytology  - Asymptomatic   - CXR with unchanged large right pleural effusion  - Was previously set up for outpatient Pleurx  10/20 Remains asymptomatic. Sats stable on RA.  10/22 remains on RA. CXR performed to r/o TB for STR placement - shows large R pleural effusion with atelectasis or infiltrate R base - TB cannot be excluded. PPD ordered.     Nausea, vomiting, dehydration  - Possibly secondary to brain mets vs recent chemo  - Continue antiemetics  - Continue IVF  10/20 Good relief with Zofran. Will advance diet. 10/22 No c/o N/V/D overnight. Diarrhea  - C diff pending  - Can utilize antidiarrheals if negative   10/20 No further report of diarrhea. Resolved. 10/21 No further episodes of diarrhea. Cdiff canceled. Cancer-related pain  - PC following  10/20 On MS Contin. Encouraged to use Norco for breakthrough pain. 10/21 Pt c/o MS Contin only lasting around 8 hours. Requests increase - ok to increase to q 8 hours. 10/22 Reports better pain control    Hyponatremia, mild electrolyte abnormalities  - Monitor  - Replace per [employer] SOPs  10/20 Improving. 10/21 Na+ 136. Replete Mg+. CCa++ 11.6, IVF ordered. 10/22 Na+ 133. Replete Mg+. CCa++ 11.8. Increase IVF. Continue home meds  [facility] SOPs  Lovenox for DVT ppx    Goals and plan of care reviewed with the patient. All questions answered to the best of our ability. Disposition: Awaiting PT/OT eval - STR vs home with [facility] therapy. Salinas Jonas NP   [facility]  [address] [place] [address]  Office : [phone]  Fax : [phone]   I personally saw, exammed and counselled the patient, and discussed with NP, agree with above history/assessment/plan. 66 y.o. male patient's diagnosis of extensive stage small cell lung cancer with limited performance status, status post palliative bone radiation and 1 cycle of cisplatin/etoposide although he claimed he did not know he had lung cancer, admitted for nausea/vomiting/diarrhea, MRI showed brain metastasis, continue high-dose Decadron and brain radiation, antiemetics, pain control and supportive care, PT evaluation from placement needs, patient somehow has a serious concern of missing resident become homeless if going to a nursing facility, continue care as above.        Hector Cardoso M.D.   [address] [address]  Office : [phone]  Fax : [phone]

## 2021-10-22 NOTE — PROGRESS NOTES
Problem: Self Care Deficits Care Plan (Adult)  Goal: *Acute Goals and Plan of Care (Insert Text)  Outcome: Resolved/Met  Note:   Pt will be (I) with ADL's and ambulated short distances. OCCUPATIONAL THERAPY: Initial Assessment, Daily Note, Discharge, and PM 10/22/2021  INPATIENT:    Payor: Ana Frost OF [place]ICARE / Plan: Jacob Vásquez [place]/PPO / Product Type: Managed Care Medicare /      NAME/AGE/GENDER: Maryana Gregg is a 73 y.o. male   PRIMARY DIAGNOSIS:  Nausea and vomiting [R11.2] Small cell lung carcinoma, right ([address] Small cell lung carcinoma, right ([address]        ICD-10: Treatment Diagnosis:    Generalized Muscle Weakness (M62.81)   Precautions/Allergies:     Patient has no known allergies. ASSESSMENT:     Mr. Kaveh Nj presents with above diagnosis and was seen in room and ulloa with PT. Pt is able to complete his own shower and ambulates independently. No further OT warranted. OCCUPATIONAL PROFILE AND HISTORY:   History of Present Injury/Illness (Reason for Referral):  See H&P  Past Medical History/Comorbidities:   Mr. Kaveh Nj  has a past medical history of Cancer [facility]), Chronic pain, Lung cancer ([address] (9/29/2021), Nausea & vomiting (9/23/2021), Sinusitis (4/2010 ), and Trauma (9/19/2009). He also has no past medical history of Arrhythmia, Arthritis, Asthma, Autoimmune disease ([address] CAD (coronary artery disease), Chronic kidney disease, Coagulation defects, COPD, Diabetes ([address] Difficult intubation, GERD (gastroesophageal reflux disease), Heart failure ([address] Hypertension, Liver disease, Malignant hyperthermia due to anesthesia, Other ill-defined conditions(799.89), Pseudocholinesterase deficiency, Psychiatric disorder, PUD (peptic ulcer disease), Seizures ([address] Stroke ([address] Thromboembolus ([address] Thyroid disease, Unspecified adverse effect of anesthesia, or Unspecified sleep apnea.   Mr. Kaveh Nj  has a past surgical history that includes hx appendectomy and hx heent (9/2009). Social History/Living Environment:   Home Environment: Apartment  One/Two Story Residence: Two story  # of Interior Steps: 4  Living Alone: Yes  Support Systems: No Support Systems  Patient Expects to be Discharged to[de-identified] Apartment  Current DME Used/Available at Home: Cane, straight  Prior Level of Function/Work/Activity:  Independent with all activities of daily living to include driving.       Number of Personal Factors/Comorbidities that affect the Plan of Care: Brief history (0):  LOW COMPLEXITY   ASSESSMENT OF OCCUPATIONAL PERFORMANCE[de-identified]   Activities of Daily Living:   Basic ADLs (From Assessment) Complex ADLs (From Assessment)   Feeding: Independent  Oral Facial Hygiene/Grooming: Independent  Bathing: Independent  Upper Body Dressing: Independent  Lower Body Dressing: Independent  Toileting: Independent     Grooming/Bathing/Dressing Activities of Daily Living     Cognitive Retraining  Safety/Judgement: Awareness of environment                 Functional Transfers  Bathroom Mobility: Independent  Toilet Transfer : Independent  Shower Transfer: Independent     Bed/Mat Mobility  Supine to Sit: Independent  Sit to Supine: Independent  Sit to Stand: Independent  Stand to Sit: Independent  Bed to Chair: Independent  Scooting: Independent     Most Recent Physical Functioning:   Gross Assessment:                  Posture:     Balance:  Sitting: Intact  Standing: Intact Bed Mobility:  Supine to Sit: Independent  Sit to Supine: Independent  Scooting: Independent  Wheelchair Mobility:     Transfers:  Sit to Stand: Independent  Stand to Sit: Independent  Bed to Chair: Independent            Patient Vitals for the past 6 hrs:   BP SpO2 Pulse   10/22/21 1045 118/80 99 % 89   10/22/21 1514 122/77 100 % 94       Mental Status  Neurologic State: Alert  Orientation Level: Oriented X4  Cognition: Appropriate for age attention/concentration  Perception: Appears intact  Perseveration: No perseveration noted  Safety/Judgement: Awareness of environment                          Physical Skills Involved: Activity Tolerance Cognitive Skills Affected (resulting in the inability to perform in a timely and safe manner):  none Psychosocial Skills Affected:  none   Number of elements that affect the Plan of Care: 1-3:  LOW COMPLEXITY   CLINICAL DECISION MAKIN[address] [place] [address] AM-PAC 6 Clicks   Daily Activity Inpatient Short Form  How much help from another person does the patient currently need. .. Total A Lot A Little None   1. Putting on and taking off regular lower body clothing? [] 1   [] 2   [] 3   [x] 4   2. Bathing (including washing, rinsing, drying)? [] 1   [] 2   [] 3   [x] 4   3. Toileting, which includes using toilet, bedpan or urinal?   [] 1   [] 2   [] 3   [x] 4   4. Putting on and taking off regular upper body clothing? [] 1   [] 2   [] 3   [x] 4   5. Taking care of personal grooming such as brushing teeth? [] 1   [] 2   [] 3   [x] 4   6. Eating meals? [] 1   [] 2   [] 3   [x] 4   © , Trustees of [address] [place] [address], under license to [employer]. All rights reserved      Score:  Initial: 24 Most Recent: X (Date: -- )    Interpretation of Tool:  Represents activities that are increasingly more difficult (i.e. Bed mobility, Transfers, Gait). Use of outcome tool(s) and clinical judgement create a POC that gives a: LOW COMPLEXITY         TREATMENT:   (In addition to Assessment/Re-Assessment sessions the following treatments were rendered)     Pre-treatment Symptoms/Complaints:    Pain: Initial:     0 Post Session:  0     Self Care: (10 min): Procedure(s) (per grid) utilized to improve and/or restore self-care/home management as related to  functional mobility . Required no verbal and   cueing to facilitate activities of daily living skills and compensatory activities.     Assessment/Reassessment only, no treatment provided today    Braces/Orthotics/Lines/Etc:   O2 Device: None (Room air)  Treatment/Session Assessment:    Response to Treatment:  pt up in room and ulloa tolerated well  Interdisciplinary Collaboration:   Physical Therapist  Occupational Therapist  Registered Nurse    After treatment position/precautions:   Supine in bed  Bed/Chair-wheels locked  Bed in low position  Call light within reach  RN notified   Compliance with Program/Exercises: Compliant all of the time.     Total Treatment Duration:  OT Patient Time In/Time Out  Time In: 1325  Time Out: [address] Lior Baker

## 2021-10-22 NOTE — PROGRESS NOTES
Removed all lines,no needs voiced. Contacted the secrurity giving back his belongings(money,bank card etc)   Wheel pt down to the hallway  pt picked up by an uber .

## 2021-10-22 NOTE — PROGRESS NOTES
Patient: Brad Becker MRN: [id number]  SSN: [id number]    YOB: 1955  Age: 73 y.o. Sex: male      DIAGNOSIS:  ES-SCLC    TREATMENT SITE:  WBRT    DOSE and FRACTIONATION:  2 of 10 fractions; 600 of 3000cGy    INTERVAL HISTORY:  Brad Becker is a 73 y.o. 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 Week 1: Mild headache and nausea     OBJECTIVE:  NAD  There were no vitals taken for this visit. Lab Results   Component Value Date/Time    Sodium 133 (L) 10/22/2021 05:27 AM    Potassium 4.4 10/22/2021 05:27 AM    Chloride 101 10/22/2021 05:27 AM    CO2 30 10/22/2021 05:27 AM    Anion gap 2 (L) 10/22/2021 05:27 AM    Glucose 98 10/22/2021 05:27 AM    BUN 13 10/22/2021 05:27 AM    Creatinine 0.44 (L) 10/22/2021 05:27 AM    GFR est AA >60 10/22/2021 05:27 AM    GFR est non-AA >60 10/22/2021 05:27 AM    Calcium 10.2 10/22/2021 05:27 AM    Magnesium 1.4 (L) 10/22/2021 05:27 AM    Albumin 2.0 (L) 10/22/2021 05:27 AM    Protein, total 7.2 10/22/2021 05:27 AM    Globulin 5.2 (H) 10/22/2021 05:27 AM    A-G Ratio 0.4 (L) 10/22/2021 05:27 AM    ALT (SGPT) 35 10/22/2021 05:27 AM     Lab Results   Component Value Date/Time    WBC 15.5 (H) 10/22/2021 05:27 AM    HGB 8.3 (L) 10/22/2021 05:27 AM    HCT 26.7 (L) 10/22/2021 05:27 AM    PLATELET 059 (H) 73/70/9715 05:27 AM       ASSESSMENT and PLAN:  Brad Becker is tolerating radiation as anticipated for the current dose and fraction. We will continue on as planned with another treatment visit anticipated next week.     - Patient will likely be discharged today, RN discussed with  need for discharge either to rehab which will allow for continued urgent radiation for brain metastases as patient wishes to continue cancer-directed therapy  - Continue dexamethasone for nausea and headache, will initiate taper after completion of radiation    Eliza Quintero MD   October 22, 2021

## 2021-10-22 NOTE — DISCHARGE SUMMARY
[facility]: Inpatient Hematology / Oncology Discharge Summary Note    Patient ID:  Quynh Lopez  [id number]  81 y.o.  1955    Admit Date: 10/18/2021    Discharge Date: 10/22/2021    Admission Diagnoses: Nausea and vomiting [R11.2]    Discharge Diagnoses:  Principal Diagnosis: Small cell lung carcinoma, right ([address]  Principal Problem:    Small cell lung carcinoma, right ([address] (9/23/2021)    Active Problems:    Recurrent right pleural effusion (10/6/2021)      Dehydration (9/23/2021)      Intractable vomiting with nausea (9/23/2021)      Hypokalemia (9/23/2021)      Severe protein-calorie malnutrition ([address] (10/1/2021)      Brain metastasis ([facility] (10/18/2021)      Nausea and vomiting (10/18/2021)        Hospital Course:  Mr. Jeffy Galarza is a 73 y.o. male admitted on 10/18/2021. The primary encounter diagnosis was Pleural effusion. A diagnosis of Malignant neoplasm of lung, unspecified laterality, unspecified part of lung ([address] was also pertinent to this visit.      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 He is a current tobacco smoker. He is a relatively new patient of Dr César Watson seen for the first time 9/2021. He underwent CT scan on 8/12/21 for shortness of breath and noted right pleural effusion and large mediastinal mass as well as left adrenal nodule and left axillary node. He underwent right thoracentesis which was negative for malignant cells. PET scan showed axillary, mediastinal, RP and left perihilar adenopathy as well as destructive left lateral rib lesion and multiple bony metastatic sites including C7, C6, right scapula, ribs and left iliac bone. Biopsy of left axillary node showed SCLC. It was planned to start carbo/etoposide/tecentriq. However, he had limited performance status and he received first cycle of chemotherapy inpatient (no Tecentriq) and was discharged 10/3/21.  Of note, he was recently re-admitted for recurrent pleural effusion and had 800cc on 10/7/21. Cytology not sent.      Mr Macario presented to the ED 10/18/21 with c/o generalized weakness, nausea, vomiting and diarrhea for the preceding week. It was noted that he had a recent MRI that showed brain mets and his phone was shut off and was therefore unable to be notified of the results. He was admitted for further management of nausea, vomiting, diarrhea and dehydration. Diarrhea resolved, unable to test for Cdiff d/t resolution of diarrhea. N/V resolved with antiemetics. He began brain XRT on 10/21 for a plan of 10 fractions (completed 2 fx thus far). On Dex 4mg q 6 hours. He c/o shortness of breath when he has hiccups. Baclofen ordered and helping with sx. Sats well on RA. No dyspnea noted. CXR with unchanged large R pleural effusion; he is asymptomatic. He is feeling better and is ready for discharge home with [facility] therapy. He is scheduled to f/u next week and will continue XRT with Rad Onc on 10/25. Advised to call with fever, chills, uncontrollable symptoms, or with any other concerns. 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 10/21 XRT to begin today, 1 of 10 fx.  10/22 XRT, 2/10 fx. Con't Dex.     Hx of recurrent pleural effusion  - No evidence of malignant cells on previous cytology  - Asymptomatic   - CXR with unchanged large right pleural effusion  - Was previously set up for outpatient Pleurx  10/20 Remains asymptomatic. Sats stable on RA.  10/22 remains on RA.   CXR performed to r/o TB for STR placement - shows large R pleural effusion with atelectasis or infiltrate R base - TB cannot be excluded. PPD ordered.     Nausea, vomiting, dehydration  - Possibly secondary to brain mets vs recent chemo  - Continue antiemetics  - Continue IVF  10/20 Good relief with Zofran. Will advance diet. 10/22 No c/o N/V/D overnight.     Diarrhea  - C diff pending  - Can utilize antidiarrheals if negative   10/20 No further report of diarrhea. Resolved. 10/21 No further episodes of diarrhea. Cdiff canceled.     Cancer-related pain  - PC following  10/20 On MS Contin. Encouraged to use Norco for breakthrough pain. 10/21 Pt c/o MS Contin only lasting around 8 hours. Requests increase - ok to increase to q 8 hours. 10/22 Reports better pain control     Hyponatremia, mild electrolyte abnormalities  - Monitor  - Replace per [employer] SOPs  10/20 Improving. 10/21 Na+ 136. Replete Mg+. CCa++ 11.6, IVF ordered. 10/22 Na+ 133. Replete Mg+. CCa++ 11.8. Increase IVF. Consults:  IP CONSULT TO PALLIATIVE CARE - PROVIDER  IP CONSULT TO ONCOLOGY    Pertinent Diagnostic Studies:   Labs:    Recent Labs     10/22/21  0527 10/21/21  0303 10/20/21  0855   WBC 15.5* 13.0* 11.0   HGB 8.3* 7.2* 8.4*   * 758* 923*   ANEU 12.4* 10.3* 8.5*      Recent Labs     10/22/21  0527 10/21/21  0303 10/20/21  0855   * 136 136   K 4.4 4.2 4.4    102 102   CO2 30 29 27   GLU 98 121* 154*   BUN 13 10 4*   CREA 0.44* 0.55* 0.60*   CA 10.2 9.9 9.9   AP 80 72 72   TP 7.2 7.2 7.9   ALB 2.0* 1.9* 2.2*   MG 1.4* 1.6* 1.6*       Imaging:  XR CHEST PA LAT [[id number]] Collected: 10/22/21 1204   Order Status: Completed Updated: 10/22/21 1208   Narrative:     CHEST X-RAY, 2 views. INDICATION:  Placement, TB assessment. TECHNIQUE: PA and lateral views. COMPARISON: 18 October 2021. FINDINGS:   Lungs: Left lung grossly clear. Atelectasis or infiltrate right base. Costophrenic angles: Large right pleural effusion. Heart size: Right heart border obscured.    Pulmonary vasculature: is unremarkable. Aorta: Unremarkable. Included portion of the upper abdomen: is unremarkable. Bones: No gross bony lesions. Other: None. Impression:     Large right pleural effusion with atelectasis or infiltrate right   base. TB cannot be excluded. XR CHEST PA LAT [[id number]] Collected: 10/18/21 1648   Order Status: Completed Updated: 10/18/21 1651   Narrative:     EXAMINATION: XR CHEST PA LAT 10/18/2021 4:47 PM     INDICATION: Generalized weakness for one week with nausea vomiting and diarrhea. Lung cancer. COMPARISON: Chest radiograph 10/7/2021     TECHNIQUE: PA and lateral views of the chest were obtained. FINDINGS:     Osseous: No acute abnormality. Cardiomediastinal Silhouette: Obscured     Lungs: Right lung consolidation is unchanged. No left lung abnormality. Pleura: Large right pleural effusion is unchanged. No pneumothorax. No left   pleural abnormality. Upper Abdomen: No abnormality. Impression:     Unchanged large right pleural effusion from 10/7/2021          Current Discharge Medication List      START taking these medications    Details   baclofen 5 mg tab Take 5 mg by mouth three (3) times daily as needed (hiccups). Qty: 30 Tablet, Refills: 0  Start date: 10/22/2021         CONTINUE these medications which have CHANGED    Details   dexAMETHasone (DECADRON) 4 mg tablet Take 4 mg by mouth every six (6) hours. Indications: brain mets  Qty: 120 Tablet, Refills: 0  Start date: 10/22/2021    Associated Diagnoses: Primary malignant neoplasm of lung metastatic to other site, unspecified laterality (HCC)      HYDROcodone-acetaminophen (Norco)  mg tablet Take 1 Tablet by mouth every six (6) hours as needed for Pain for up to 30 days. Max Daily Amount: 4 Tablets.   Qty: 90 Tablet, Refills: 0  Start date: 10/22/2021, End date: 11/21/2021    Associated Diagnoses: Cancer related pain; Encounter for palliative care      morphine CR (MS CONTIN) 30 mg CR tablet Take 1 Tablet by mouth every eight (8) hours for 30 days. Max Daily Amount: 90 mg.  Qty: 90 Tablet, Refills: 0  Start date: 10/22/2021, End date: 11/21/2021    Associated Diagnoses: Cancer related pain         CONTINUE these medications which have NOT CHANGED    Details   metoprolol tartrate (LOPRESSOR) 25 mg tablet Take 0.5 Tablets by mouth two (2) times a day. Qty: 30 Tablet, Refills: 1      albuterol (PROVENTIL HFA, VENTOLIN HFA, PROAIR HFA) 90 mcg/actuation inhaler Take 1 Puff by inhalation every four (4) hours as needed for Wheezing. Advair Diskus 250-50 mcg/dose diskus inhaler Take 1 Puff by inhalation two (2) times a day. mirtazapine (REMERON) 15 mg tablet Take 1 Tablet by mouth nightly. Qty: 30 Tablet, Refills: 2      aspirin delayed-release 81 mg tablet Take 81 mg by mouth daily. EC      potassium chloride SR (Klor-Con 10) 10 mEq tablet Take 10 mEq by mouth daily. gabapentin (NEURONTIN) 300 mg capsule Take 300 mg by mouth three (3) times daily. prochlorperazine (COMPAZINE) 10 mg tablet Take 10 mg by mouth every six (6) hours as needed for Nausea or Vomiting.      magnesium oxide (MAG-OX) 400 mg tablet Take 1 Tablet by mouth four (4) times daily. Qty: 120 Tablet, Refills: 0      fluticasone propionate (FLONASE) 50 mcg/actuation nasal spray 2 Sprays by Both Nostrils route daily. pantoprazole (PROTONIX) 40 mg tablet Take 40 mg by mouth daily. furosemide (LASIX) 20 mg tablet Take 20 mg by mouth daily. ondansetron hcl (ZOFRAN) 4 mg tablet Take 4 mg by mouth every eight (8) hours as needed for Nausea or Vomiting. I have reviewed the patient's controlled substance prescription history, as maintained in the [place] prescription monitoring program, so that the prescriptions(s) for a controlled substance can be given.     OBJECTIVE:  Patient Vitals for the past 8 hrs:   BP Temp Pulse Resp SpO2   10/22/21 0801 (!) 135/93 98.5 °F (36.9 °C) 89 20 100 %   10/22/21 0757 -- -- -- -- 100 %   10/22/21 0303 139/84 97.8 °F (36.6 °C) 88 20 100 %     Temp (24hrs), Av.1 °F (36.7 °C), Min:97.8 °F (36.6 °C), Max:98.5 °F (36.9 °C)    10/22 07 - 10/22 1900  In: 240 [P.O.:240]  Out: -     Physical Exam:  Constitutional: Well developed, well nourished male in no acute distress, sitting comfortably in the hospital bed. HEENT: Normocephalic and atraumatic. Oropharynx is clear, mucous membranes are moist. Extraocular muscles are intact. Sclerae anicteric. Neck supple without JVD. No thyromegaly present. Skin Warm and dry. No bruising and no rash noted. No erythema. No pallor. Respiratory +decreased on the right. Lungs are clear to auscultation bilaterally without wheezes, rales or rhonchi, normal air exchange without accessory muscle use. CVS Normal rate, regular rhythm and normal S1 and S2. No murmurs, gallops, or rubs. Abdomen Soft, nontender and nondistended, normoactive bowel sounds. No palpable mass. No hepatosplenomegaly. Neuro Grossly nonfocal with no obvious sensory or motor deficits. MSK Normal range of motion in general.  No edema and no tenderness.    Psych Appropriate mood and affect.           ASSESSMENT:    Principal Problem:    Small cell lung carcinoma, right ([address] (2021)    Active Problems:    Recurrent right pleural effusion (10/6/2021)      Dehydration (2021)      Intractable vomiting with nausea (2021)      Hypokalemia (2021)      Severe protein-calorie malnutrition ([address] (10/1/2021)      Brain metastasis ([address] (10/18/2021)      Nausea and vomiting (10/18/2021)        DISPOSITION:  Follow-up Appointments   Procedures    FOLLOW UP VISIT Appointment in: Other (Specify) Please schedule follow up appt with Dr. Nash Campos or NP within one week with labs prior (CBC, CMP, Mag)     Please schedule follow up appt with Dr. Nash Campos or NP within one week with labs prior (CBC, CMP, Mag)     Standing Status:   Standing     Number of Occurrences:   1     Order Specific Question:   Appointment in     Answer: Other (Specify)       Over 60 minutes was spent in discharge planning and coordination of care. Jodie Owen NP  [facility]  [address] [place] [address]  Office : [phone]  Fax : [phone]   I personally saw, exammed and counselled the patient, and discussed with NP, agree with above history/assessment/plan. 66 y.o. male patient's diagnosis of extensive stage small cell lung cancer with limited performance status, status post palliative bone radiation and 1 cycle of cisplatin/etoposide although he claimed he did not know he had lung cancer, admitted for nausea/vomiting/diarrhea, MRI showed brain metastasis, continue high-dose Decadron and started brain radiation, continue antiemetics, pain control and supportive care, updated sister over the phone, PT evaluated and discharged home as above planned to continue radiation at [address] [place] [address].  Ligia Peoples M.D.   [address]  Office : [phone]  Fax : [phone]

## 2021-10-22 NOTE — PROGRESS NOTES
Patient: Maryana Gregg MRN: [id number]  SSN: [id number]    YOB: 1955  Age: 73 y.o. Sex: male      Other Providers:  Dr. Benjamin Hooks, Dr. Ciara Sin: Shortness of breath, nausea    DIAGNOSIS: ES-SCLC    PREVIOUS RADIATION TREATMENT:  1) [id number] to the L rib, 9/10/21    HISTORY OF PRESENT ILLNESS:  Maryana Gregg is a 73 y.o. male who I am seeing at the request of Dr. Daryl Alatorre.     Mr. Kaveh Nj is a current everyday smoker (30 pack years) who presented to medical attention after complaining of shortness of breath. A CXR was performed 8/5/2021 which demonstrated a large right pleural effusion with basilar consolidation/ collapse and mass effect in the superior right hilum suspicious for obstructing central bronchogenic carcinoma. A CT chest was performed 8/12/21 which confirmed a large mediastinal mass with occlusion of the right mainstem bronchus concerning for malignancy as well as an enlarged axillary lymph node and adrenal gland nodule and large right pleural effusion. On 8/16/21 he underwent a thoracentesis. Cytology was negative for malignant cells, fungal smear was negative. On 8/24/21 he [facility] to [facility] complaining of increased shortness of breath. A CT was performed which demonstrated no PE but re-accumulation of a large left pleural effusion. A PET/CT 8/26/21 demonstrated a hypermetabolic periauricular, supraclavicular, hilar, mediastinal, retroperitoneal, and pelvic sidewall lymphadenopathy as well as uptake in the renal cortex and bones. He is currently scheduled for an IR guided biopsy of his left axillary lymph node on 9/3/21. Currently he reports pain in his left chest wall which is a 5/10 at rest and a 10/10 when sleeping or with movement. He is receiving [place] from his primary care physician currently. He was treated with 800cGy palliative radiation to the L rib on 9/10/21.     INTERVAL HISTORY:  Mr. Kaveh Nj received one cycle carbo/ etoposide as an inpatient 10/2/21 due to poor performance status. On 10/16/21 he underwent an MRI brain which demonstrated metastatic disease and for which outpatient management had been planned. However, he was unreachable. On 10/18/21 he presented to the emergency department complaining of nausea and shortness of breath. Currently, he reports persistent nausea and intermittent headache. He denies focal numbness, tingling, and weakness and has had no vomiting. PAST MEDICAL HISTORY:    Past Medical History:   Diagnosis Date    Cancer [facility])     lung cancer    Chronic pain     headaches    Lung cancer ([facility] [address] 9/29/2021    Nausea & vomiting 9/23/2021    Sinusitis 4/2010     taking abx    Trauma 9/19/2009    brain injury- left side affected-  bilat eye injuries, blind in left eye,  deaf in left ear,\"every bone in skull was broken\"       The patient denies history of collagen vascular diseases, pacemaker insertion,. See HPI for details of prior radiation and prior chemotherapy. PAST SURGICAL HISTORY:   Past Surgical History:   Procedure Laterality Date    HX APPENDECTOMY      HX HEENT  9/2009    facial reconstruction post trauma       MEDICATIONS:   No current facility-administered medications for this encounter. Current Outpatient Medications:     dexAMETHasone (DECADRON) 4 mg tablet, Take 4 mg by mouth every six (6) hours. Indications: brain mets, Disp: 120 Tablet, Rfl: 0    baclofen 5 mg tab, Take 5 mg by mouth three (3) times daily as needed (hiccups). , Disp: 30 Tablet, Rfl: 0    HYDROcodone-acetaminophen (Norco)  mg tablet, Take 1 Tablet by mouth every six (6) hours as needed for Pain for up to 30 days. Max Daily Amount: 4 Tablets. , Disp: 90 Tablet, Rfl: 0    morphine CR (MS CONTIN) 30 mg CR tablet, Take 1 Tablet by mouth every eight (8) hours for 30 days.  Max Daily Amount: 90 mg., Disp: 90 Tablet, Rfl: 0    Facility-Administered Medications Ordered in Other Encounters:     tuberculin injection 5 Units, 5 Units, IntraDERMal, Kirsty Hermosillo NP    magnesium oxide (MAG-OX) tablet 400 mg, 400 mg, Oral, TID, Chelsie Alexis MD, 400 mg at 10/22/21 6287    0.9% sodium chloride infusion, 100 mL/hr, IntraVENous, CONTINUOUS, Kvng Severino NP, Last Rate: 100 mL/hr at 10/22/21 1337, 100 mL/hr at 10/22/21 1337    morphine CR (MS CONTIN) tablet 30 mg, 30 mg, Oral, Q8H, Kvng Severino NP, 30 mg at 10/22/21 1413    baclofen (LIORESAL) tablet 5 mg, 5 mg, Oral, TID PRN, Chelsie Alexis MD, 5 mg at 10/21/21 2345    gabapentin (NEURONTIN) capsule 300 mg, 300 mg, Oral, TID, Juana Mitchell MD, 300 mg at 10/22/21 0477    HYDROcodone-acetaminophen (NORCO)  mg tablet 1 Tablet, 1 Tablet, Oral, Q6H PRN, Juana Mitchell MD, 1 Tablet at 10/20/21 1715    mirtazapine (REMERON) tablet 15 mg, 15 mg, Oral, QHS, Juana Mitchell MD, 15 mg at 10/21/21 2144    morphine injection 4 mg, 4 mg, IntraVENous, Q6H PRN, Scooter Perez NP, 4 mg at 10/21/21 2002    naloxone [facility]) injection 0.4 mg, 0.4 mg, IntraVENous, PRN, Scooter Perez NP    acetaminophen (TYLENOL) tablet 650 mg, 650 mg, Oral, Q6H PRN **OR** acetaminophen (TYLENOL) suppository 650 mg, 650 mg, Rectal, Q6H PRN, Myrna Anthony MD    polyethylene glycol (MIRALAX) packet 17 g, 17 g, Oral, DAILY PRN, Myrna Anthony MD, 17 g at 10/22/21 1028    ondansetron (ZOFRAN ODT) tablet 4 mg, 4 mg, Oral, Q8H PRN, 4 mg at 10/20/21 0438 **OR** ondansetron (ZOFRAN) injection 4 mg, 4 mg, IntraVENous, Q6H PRN, Myrna Anthony MD, 4 mg at 10/19/21 0056    enoxaparin (LOVENOX) injection 40 mg, 40 mg, SubCUTAneous, Q24H, Darius ESQUIVEL MD, 40 mg at 10/21/21 2143    budesonide-formoterol (SYMBICORT) 80-4.5 mcg inhaler, 2 Puff, Inhalation, BID RT, Myrna Anthony MD, 2 Puff at 10/22/21 0753    dexAMETHasone (DECADRON) tablet 4 mg, 4 mg, Oral, Q6H, Myrna Anthony MD, 4 mg at 10/22/21 1224    fluticasone propionate (FLONASE) 50 mcg/actuation nasal spray 2 Spray, 2 Spray, Both Nostrils, DAILY, Myrna Anthnoy MD, 2 Spray at 10/22/21 0833    metoprolol tartrate (LOPRESSOR) tablet 12.5 mg, 12.5 mg, Oral, BID, Jose ESQUIVEL MD, 12.5 mg at 10/22/21 4098    pantoprazole (PROTONIX) tablet 40 mg, 40 mg, Oral, DAILY, Girish Collins MD, 40 mg at 10/22/21 3543    ALLERGIES:   No Known Allergies    SOCIAL HISTORY:   Social History     Socioeconomic History    Marital status:      Spouse name: Not on file    Number of children: Not on file    Years of education: Not on file    Highest education level: Not on file   Occupational History    Not on file   Tobacco Use    Smoking status: Current Every Day Smoker     Packs/day: 0.50     Years: 30.00     Pack years: 15.00    Smokeless tobacco: Never Used   Substance and Sexual Activity    Alcohol use: Not Currently     Alcohol/week: 0.8 standard drinks     Types: 1 Cans of beer per week    Drug use: No    Sexual activity: Not on file   Other Topics Concern     Service Not Asked    Blood Transfusions Not Asked    Caffeine Concern Not Asked    Occupational Exposure Not Asked    Hobby Hazards Not Asked    Sleep Concern Not Asked    Stress Concern Not Asked    Weight Concern Not Asked    Special Diet Not Asked    Back Care Not Asked    Exercise Not Asked    Bike Helmet Not Asked    Seat Belt Not Asked    Self-Exams Not Asked   Social History Narrative    Not on file     Social Determinants of Health     Financial Resource Strain:     Difficulty of Paying Living Expenses:    Food Insecurity:     Worried About Running Out of Food in the Last Year:     Ran Out of Food in the Last Year:    Transportation Needs:     Lack of Transportation (Medical):      Lack of Transportation (Non-Medical):    Physical Activity:     Days of Exercise per Week:     Minutes of Exercise per Session:    Stress:     Feeling of Stress :    Social Connections:     Frequency of Communication with Friends and Family:     Frequency of Social Gatherings with Friends and Family:  Attends [employer] Services:     Active Member of Clubs or Organizations:     Attends Club or Organization Meetings:     Marital Status:    Intimate Partner Violence:     Fear of Current or Ex-Partner:     Emotionally Abused:     Physically Abused:     Sexually Abused:        FAMILY HISTORY:   Family History   Problem Relation Age of Onset    Diabetes Maternal Aunt     Diabetes Paternal Aunt        REVIEW OF SYSTEMS:   A full 12-point review of systems was completed and was negative unless noted in the history of present illness. PHYSICAL EXAMINATION:   ECOG Performance status 2  VITAL SIGNS:   Visit Vitals  /69 (BP 1 Location: Left upper arm, BP Patient Position: Supine)   Pulse 85   Temp 98.3 °F (36.8 °C)        General: well developed/nourished adult Male in no acute distress; appears stated age  [de-identified]: normocephalic, atraumatic; EOMI  Neck: supple with full ROM; no cervical lymphadenopathy  Cardiovascular: normal S1 and S2, RRR, no murmurs  Respiratory: normal inspiratory effort, no audible wheezes  Extremities: no cyanosis, clubbing, or edema  Musculoskeletal: mobility intact x4; normal ROM in all joints  Skin: no skin lesions identified  Neuro: AOx3; sensation intact x 4; CNII-XII grossly intact  Psych: appropriate affect, insight, and judgement  GI: abdomen soft, non-distended    PATHOLOGY:    No interval pathology.     LABORATORY:   Lab Results   Component Value Date/Time    Sodium 133 (L) 10/22/2021 05:27 AM    Potassium 4.4 10/22/2021 05:27 AM    Chloride 101 10/22/2021 05:27 AM    CO2 30 10/22/2021 05:27 AM    Anion gap 2 (L) 10/22/2021 05:27 AM    Glucose 98 10/22/2021 05:27 AM    BUN 13 10/22/2021 05:27 AM    Creatinine 0.44 (L) 10/22/2021 05:27 AM    GFR est AA >60 10/22/2021 05:27 AM    GFR est non-AA >60 10/22/2021 05:27 AM    Calcium 10.2 10/22/2021 05:27 AM    Magnesium 1.4 (L) 10/22/2021 05:27 AM    Albumin 2.0 (L) 10/22/2021 05:27 AM    Protein, total 7.2 10/22/2021 05:27 AM Globulin 5.2 (H) 10/22/2021 05:27 AM    A-G Ratio 0.4 (L) 10/22/2021 05:27 AM    ALT (SGPT) 35 10/22/2021 05:27 AM     Lab Results   Component Value Date/Time    WBC 15.5 (H) 10/22/2021 05:27 AM    HGB 8.3 (L) 10/22/2021 05:27 AM    HCT 26.7 (L) 10/22/2021 05:27 AM    PLATELET 897 (H) 74/01/3783 05:27 AM     RADIOLOGY:    I personally reviewed the MRI brain 10/16/21 and agree with the findings as documented in the HPI. IMPRESSION:  Virgil Frausto is a 73 y.o. male with brain metastases from [facility] presenting for discussion of treatment options. I had a long discussion with Virgil Frausto regarding treatment options, specifically whole brain radiation in order to reduce the risk of disease progression and minimize symptoms. Given the size of the lesions and the histology I do recommend stereotactic treatment. I reviewed in detail the anticipated acute and late toxicities of radiation therapy as well as  the logistics of treatment and expected disease control. Virgil Frausto had the opportunity to ask questions which appeared to be answered to his satisfaction and has elected to proceed with treatment. PLAN:    1) Consented patient for treatment with external beam radiation after discussing risk, benefits, and side effects from treatment.   2) CT simulation today, treatment to start 10/21/21    Tiffany Jordan MD

## 2021-10-22 NOTE — DISCHARGE INSTRUCTIONS
DISCHARGE SUMMARY from Nurse    PATIENT INSTRUCTIONS:    After general anesthesia or intravenous sedation, for 24 hours or while taking prescription Narcotics:  · Limit your activities  · Do not drive and operate hazardous machinery  · Do not make important personal or business decisions  · Do  not drink alcoholic beverages  · If you have not urinated within 8 hours after discharge, please contact your surgeon on call. Report the following to your surgeon:  · Excessive pain, swelling, redness or odor of or around the surgical area  · Temperature over 100.5  · Nausea and vomiting lasting longer than 4 hours or if unable to take medications  · Any signs of decreased circulation or nerve impairment to extremity: change in color, persistent  numbness, tingling, coldness or increase pain  · Any questions    What to do at Home:  Recommended activity: Activity as tolerated,     If you experience any of the following symptoms fever, nausea/vomit,chest discomfort,shortness of breath, please follow up with primary care provider. *  Please give a list of your current medications to your Primary Care Provider. *  Please update this list whenever your medications are discontinued, doses are      changed, or new medications (including over-the-counter products) are added. *  Please carry medication information at all times in case of emergency situations. These are general instructions for a healthy lifestyle:    No smoking/ No tobacco products/ Avoid exposure to second hand smoke  Surgeon General's Warning:  Quitting smoking now greatly reduces serious risk to your health.     Obesity, smoking, and sedentary lifestyle greatly increases your risk for illness    A healthy diet, regular physical exercise & weight monitoring are important for maintaining a healthy lifestyle    You may be retaining fluid if you have a history of heart failure or if you experience any of the following symptoms:  Weight gain of 3 pounds or more overnight or 5 pounds in a week, increased swelling in our hands or feet or shortness of breath while lying flat in bed. Please call your doctor as soon as you notice any of these symptoms; do not wait until your next office visit. The discharge information has been reviewed with the patient. The patient verbalized understanding. Discharge medications reviewed with the patient and appropriate educational materials and side effects teaching were provided.   ___________________________________________________________________________________________________________________________________

## 2021-10-22 NOTE — PROGRESS NOTES
END OF SHIFT NOTE:    Intake/Output  10/21 1901 - 10/22 0700  In: 409 [P.O.:240; I.V.:169]  Out: 1200 [Urine:1200]   Voiding: YES  Catheter: NO  Drain:              Stool:  0 occurrences. Stool Assessment  Stool Color: Other (Comment) (pt flushed) (10/18/21 2156)  Stool Appearance: Loose (10/19/21 1930)  Stool Amount: Small (10/18/21 2130)  Stool Source/Status: Rectum; Incontinence (10/18/21 2130)    Emesis:  0 occurrences. VITAL SIGNS  Patient Vitals for the past 12 hrs:   Temp Pulse Resp BP SpO2   10/22/21 0303 97.8 °F (36.6 °C) 88 20 139/84 100 %   10/21/21 2234 98.1 °F (36.7 °C) 84 18 118/76 96 %   10/21/21 2050 -- -- -- -- 100 %   10/21/21 1939 97.9 °F (36.6 °C) 92 20 124/83 93 %       Pain Assessment  Pain 1  Pain Scale 1: Numeric (0 - 10) (10/21/21 2040)  Pain Intensity 1: 0 (10/21/21 2040)  Patient Stated Pain Goal: 0 (10/21/21 2040)  Pain Reassessment 1: Patient resting w/respiratory rate greater than 10 (10/21/21 2040)  Pain Onset 1: pta (10/19/21 0509)  Pain Location 1: Back (10/20/21 0901)  Pain Orientation 1: Upper (10/20/21 0901)  Pain Description 1: Aching (10/20/21 0901)  Pain Intervention(s) 1: Medication (see MAR) (10/21/21 2002)    Ambulating  Yes    Additional Information: A/Ox4, active    Shift report given to oncoming nurse at the bedside.     Shaniqua Jacinto RN

## 2021-10-22 NOTE — PROGRESS NOTES
Problem: Mobility Impaired (Adult and Pediatric)  Goal: *Acute Goals and Plan of Care (Insert Text)  Outcome: Progressing Towards Goal  Note: NO GOALS SET. PATIENT BEING DISCHARGED DUE TO INDEPENDENCE       PHYSICAL THERAPY: Initial Assessment, Discharge, and PM 10/22/2021  INPATIENT:    Payor: [place] OF [place]ICARE / Plan: [place]la [place] OF [place] MEDICARE [place]MO/PPO / Product Type: Managed Care Medicare /       NAME/AGE/GENDER: Hamida Flores is a 73 y.o. male   PRIMARY DIAGNOSIS: Nausea and vomiting [R11.2] Small cell lung carcinoma, right ([address] Small cell lung carcinoma, right ([address]        ICD-10: Treatment Diagnosis:    Generalized Muscle Weakness (M62.81)   Precaution/Allergies:  Patient has no known allergies. ASSESSMENT:     Mr. Glendy De La Vega presents very close to functional baseline. Supervision/Independent with all mobility and okay to go home without any skilled needs from PT. He ambulated in the halls without assistive device and returned to the room. He is being discharged home today. This section established at most recent assessment            REHAB RECOMMENDATIONS (at time of discharge pending progress):    Placement: It is my opinion, based on this patient's performance to date, that Mr. Glenyd De La Vega may benefit from being discharged with NO further skilled therapy due to a proven ability to function at baseline. Equipment:   None at this time              HISTORY:   History of Present Injury/Illness (Reason for Referral):  Mr. Glendy De La Vega is a 73 y.o. male admitted on 10/18/2021. The primary encounter diagnosis was Pleural effusion. A diagnosis of Malignant neoplasm of lung, unspecified laterality, unspecified part of lung ([address] was also pertinent to this visit. 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 He is a current tobacco smoker.  He is a relatively new patient of Dr Doristine Sandhoff seen for the first time 9/2021. He underwent CT scan on 8/12/21 for shortness of breath and noted right pleural effusion and large mediastinal mass as well as left adrenal nodule and left axillary node. He underwent right thoracentesis which was negative for malignant cells. PET scan showed axillary, mediastinal, RP and left perihilar adenopathy as well as destructive left lateral rib lesion and multiple bony metastatic sites including C7, C6, right scapula, ribs and left iliac bone. Biopsy of left axillary node showed SCLC. It was planned to start carbo/etoposide/tecentriq. However, he had limited performance status and he received first cycle of chemotherapy inpatient (no Tecentriq) and was discharged 10/3/21. Of note, he was recently re-admitted for recurrent pleural effusion and had 800cc on 10/7/21. Cytology not sent. Mr Marquis Alatorre presented to the ED 10/18/21 with c/o generalized weakness, nausea, vomiting and diarrhea for the preceding week. It was noted that he had a recent MRI that showed brain mets and his phone was shut off and was therefore unable to be notified of the results. He was admitted for further management of nausea, vomiting, diarrhea and dehydration. Diarrhea resolved, unable to test for Cdiff d/t resolution of diarrhea. N/V resolved with antiemetics. He began brain XRT on 10/21 for a plan of 10 fractions (completed 2 fx thus far). On Dex 4mg q 6 hours. He c/o shortness of breath when he has hiccups. Baclofen ordered and helping with sx. Sats well on RA. No dyspnea noted. CXR with unchanged large R pleural effusion; he is asymptomatic. He is feeling better and is ready for discharge home with [place] therapy. He is scheduled to f/u next week and will continue XRT with Rad Onc on 10/25.   Advised to call with fever, chills, uncontrollable symptoms, or with any other concerns  Past Medical History/Comorbidities:   Mr. aMrquis Alatorre  has a past medical history of [facility] Chronic pain, Lung cancer ([facility] [address] (9/29/2021), Nausea & vomiting (9/23/2021), Sinusitis (4/2010 ), and Trauma (9/19/2009). He also has no past medical history of Arrhythmia, Arthritis, Asthma, Autoimmune disease ([address] CAD (coronary artery disease), Chronic kidney disease, Coagulation defects, COPD, Diabetes ([address] Difficult intubation, GERD (gastroesophageal reflux disease), Heart failure ([address] Hypertension, Liver disease, Malignant hyperthermia due to anesthesia, Other ill-defined conditions(799.89), Pseudocholinesterase deficiency, Psychiatric disorder, PUD (peptic ulcer disease), Seizures ([address] Stroke ([address] Thromboembolus ([address] Thyroid disease, Unspecified adverse effect of anesthesia, or Unspecified sleep apnea. Mr. Nurys Tovar  has a past surgical history that includes hx appendectomy and hx heent (9/2009). Social History/Living Environment:   Home Environment: Apartment  One/Two Story Residence: Two story  # of Interior Steps: 4  Living Alone: Yes  Support Systems: No Support Systems  Patient Expects to be Discharged to[de-identified] Apartment  Current DME Used/Available at Home: Cane, straight  Prior Level of Function/Work/Activity:  Independent prior to admit     Number of Personal Factors/Comorbidities that affect the Plan of Care: 0: LOW COMPLEXITY   EXAMINATION:   Most Recent Physical Functioning:   Gross Assessment:                  Posture:     Balance:  Sitting: Intact  Standing: Intact Bed Mobility:  Supine to Sit: Independent  Sit to Supine: Independent  Scooting: Independent  Wheelchair Mobility:     Transfers:  Sit to Stand: Independent  Stand to Sit: Independent  Bed to Chair: Independent  Gait:     Gait Abnormalities: Decreased step clearance; Path deviations  Distance (ft): 175 Feet (ft) (walked distance twice with sitting break in between)  Assistive Device:  (none needed)  Ambulation - Level of Assistance: Supervision; Independent  Interventions: Safety awareness training      Body Structures Involved:  Muscles Body Functions Affected:   Movement Related Activities and Participation Affected: Mobility   Number of elements that affect the Plan of Care: 3: MODERATE COMPLEXITY   CLINICAL PRESENTATION:   Presentation: Stable and uncomplicated: LOW COMPLEXITY   CLINICAL DECISION MAKING:   [facility] AM-[facility] 6 Clicks   Basic Mobility Inpatient Short Form  How much difficulty does the patient currently have. .. Unable A Lot A Little None   1. Turning over in bed (including adjusting bedclothes, sheets and blankets)? [] 1   [] 2   [] 3   [x] 4   2. Sitting down on and standing up from a chair with arms ( e.g., wheelchair, bedside commode, etc.)   [] 1   [] 2   [] 3   [x] 4   3. Moving from lying on back to sitting on the side of the bed? [] 1   [] 2   [] 3   [x] 4   How much help from another person does the patient currently need. .. Total A Lot A Little None   4. Moving to and from a bed to a chair (including a wheelchair)? [] 1   [] 2   [] 3   [x] 4   5. Need to walk in hospital room? [] 1   [] 2   [] 3   [x] 4   6. Climbing 3-5 steps with a railing? [] 1   [] 2   [] 3   [x] 4   © 2007, Trustees of [facility], under license to [employer]. All rights reserved      Score:  Initial: 24 Most Recent: X (Date: -- )    Interpretation of Tool:  Represents activities that are increasingly more difficult (i.e. Bed mobility, Transfers, Gait). Medical Necessity:       Reason for Services/Other Comments:  Discharge from PT . Use of outcome tool(s) and clinical judgement create a POC that gives a: Clear prediction of patient's progress: LOW COMPLEXITY            TREATMENT:   (In addition to Assessment/Re-Assessment sessions the following treatments were rendered)   Pre-treatment Symptoms/Complaints:  none  Pain: Initial:      Post Session:  0     Therapeutic Activity: (    10 minutes): Therapeutic activities including Bed transfers, Chair transfers, and Ambulation on level ground to improve mobility, strength, and balance.   Required minimal Safety awareness training to promote dynamic balance in standing. assessment    Braces/Orthotics/Lines/Etc:   O2 Device: None (Room air)  Treatment/Session Assessment:    Response to Treatment:  tolerated well. Gets short of breath a little   Interdisciplinary Collaboration:   Physical Therapist  Occupational Therapist  Registered Nurse    After treatment position/precautions:   Up in room    Compliance with Program/Exercises: Compliant all of the time  Recommendations/Intent for next treatment session:  discharge home today.   Total Treatment Duration:  PT Patient Time In/Time Out  Time In: 1305  Time Out: Ginger Holloway 85, PT

## 2021-10-27 NOTE — CASE COMMUNICATION
SN for VAL    resume HH SN and MSW. Pt declined PT services. Pt presented to [facility] ED on 10/18/21 with c/o generalized weakness, nausea, vomiting and diarrhea for the preceding week. He had a recent MRI that showed brain mets. His phone was shut off and was therefore unable to be notified of the results. He was admitted for further management of nausea, vomiting, diarrhea and dehydration. Diarrhea resolved, unable to test for C. diff  d/t resolution of diarrhea. N/V resolved with antiemetics. He began brain XRT on 10/21 for a plan of 10 fractions (completed 2 fx thus far). On Dex 4mg q 6 hours. He c/o SOB with hiccups. Symptoms treated with Baclofen. CXR with unchanged large R pleural effusion; he is asymptomatic. He is feeling better and is ready for discharge home. He is scheduled to f/u next week and will continue XRT with Rad Onc on 10/25. Pt d/c  home on 10/22/21. Please see patient for Pleurx drainage and education. Pleurx placement on 11/4/2021    Sn met at door by pt. Pt ambulating around home without difficulty. Pt is able to ambulate outside down porch stairs with supervision. Pt taking his own meds however he is missing Metoprolol, Klor Con, Protonix, Magnesium Oxide. Pt states he has gotten confused with his Morphine administration prior to last hospital admit. Pt /66 during visit. Pt states he has a friend that assist with grocery shopping but no other assistance available. Pt states he will not have any assitance in home after pleurex placement surgery. Pt states he is ambulating stairs since his bedroom is upstairs. Pt continues to smoke.      SN  1 wk 1, 2 wk 3

## 2021-10-29 PROBLEM — J94.8 HYDROPNEUMOTHORAX: Status: ACTIVE | Noted: 2021-01-01

## 2021-10-29 PROBLEM — K63.1 BOWEL PERFORATION (HCC): Status: ACTIVE | Noted: 2021-01-01

## 2021-10-29 PROBLEM — C34.90 SCLC (SMALL CELL LUNG CARCINOMA) (HCC): Status: ACTIVE | Noted: 2021-01-01

## 2021-10-29 PROBLEM — A41.9 SEVERE SEPSIS (HCC): Status: ACTIVE | Noted: 2021-01-01

## 2021-10-29 PROBLEM — J96.01 ACUTE RESPIRATORY FAILURE WITH HYPOXIA (HCC): Status: ACTIVE | Noted: 2021-01-01

## 2021-10-29 PROBLEM — R65.20 SEVERE SEPSIS (HCC): Status: ACTIVE | Noted: 2021-01-01

## 2021-10-29 NOTE — PROGRESS NOTES
Patient: Ibeth Floyd MRN: [id number]  SSN: [id number]    YOB: 1955  Age: 73 y.o. Sex: male      DIAGNOSIS:  ES-SCLC    TREATMENT SITE:  WBRT    DOSE and FRACTIONATION:  6 of 10 fractions; 1800 of 3000cGy    INTERVAL HISTORY:  Ibeth Floyd is a 73 y.o. 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 Week 1: Mild headache and nausea   Week 2: Chest pain, shortness of breath, nausea and vomiting    OBJECTIVE:  NAD  There were no vitals taken for this visit. Lab Results   Component Value Date/Time    Sodium 133 (L) 10/22/2021 05:27 AM    Potassium 4.4 10/22/2021 05:27 AM    Chloride 101 10/22/2021 05:27 AM    CO2 30 10/22/2021 05:27 AM    Anion gap 2 (L) 10/22/2021 05:27 AM    Glucose 98 10/22/2021 05:27 AM    BUN 13 10/22/2021 05:27 AM    Creatinine 0.44 (L) 10/22/2021 05:27 AM    GFR est AA >60 10/22/2021 05:27 AM    GFR est non-AA >60 10/22/2021 05:27 AM    Calcium 10.2 10/22/2021 05:27 AM    Magnesium 1.4 (L) 10/22/2021 05:27 AM    Albumin 2.0 (L) 10/22/2021 05:27 AM    Protein, total 7.2 10/22/2021 05:27 AM    Globulin 5.2 (H) 10/22/2021 05:27 AM    A-G Ratio 0.4 (L) 10/22/2021 05:27 AM    ALT (SGPT) 35 10/22/2021 05:27 AM     Lab Results   Component Value Date/Time    WBC 15.5 (H) 10/22/2021 05:27 AM    HGB 8.3 (L) 10/22/2021 05:27 AM    HCT 26.7 (L) 10/22/2021 05:27 AM    PLATELET 177 (H) 52/60/4822 05:27 AM     ASSESSMENT and PLAN:  Ibeth Floyd is tolerating radiation as anticipated for the current dose and fraction. We will continue on as planned with another treatment visit anticipated next week.     - EMS contacted for emergent evaluatino    Missy Mendoza MD   October 29, 2021

## 2021-10-29 NOTE — PROGRESS NOTES
Patient was intubated with a number 8.0 ET Tube. Tube placement verified by auscultation, by CXR and ETCO2 monitor. ET Tube is secured at the 23 cm sol at the teeth and on the Center side. Patient was intubated by Jovany Alston MD on the 2 attempt. Breath sounds are coarse and diminished. Patient is Negative for subcutaneous air and chest excursion is symmetric. Trachea is midline. Patient is also Negative for cyanosis and is Negative for pitting edema. Patient placed on ventilator on documented settings. All alarms are set and audible. Resuscitation bag is at the head of the bed.       Ventilator Settings  Mode FIO2 Rate Tidal Volume Pressure PEEP I:E Ratio   Assist control  50 % 16  470 ml     8 cm H20  1:3.2      Peak airway pressure: 8.3 cm H2O   Minute ventilation: 11.4 l/min

## 2021-10-29 NOTE — H&P
Hospitalist History and Physical   Admit Date:  10/29/2021  9:28 AM   Name:  Wilfred Villegas   Age:  73 y.o. Sex:  male  :  1955   MRN:  [id number]   Room:  [facility]/    Presenting Complaint: Abdominal Pain,shortness of breath, chest pain    Reason(s) for Admission: Bowel perforation ([facility] [K63.1]  Hydropneumothorax [J94.8]  SCLC (small cell lung carcinoma) ([facility] [C34.90]  Brain metastasis ([facility] [facility] [C79.31]  Severe sepsis ([facility] [facility] [A41.9, R65.20]     History of Present Illness:   Wilfred Villegas is a 73 y.o. 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 Patient with history of stage IV small cell lung cancer with mets to brain, lymphadenopathy and bone. Getting radiation and chemo. History of recurrent pleural effusion. Patient complaining of abdominal pain which started last night associated with shortness of breath. Notices abdominal pain sharp all over her abdomen increased in movement associated with increased shortness of breath. He went to radiation clinic with the above symptoms he was sent to emergency room for further evaluation. Patient in the emergency room is moderate to severe respiratory distress complaining of abdominal pain all over and had difficulty breathing. Sodium of 130, initial lactic acid 11.2, repeat lactic acid of 10.9, procalcitonin of 46.23, proBNP of 1198    CT abdomen chest pelvis showed  IMPRESSION     1. Large right lower lobe tumor with extension into the heart and mediastinal  structures, mediastinal and supraclavicular adenopathy, and right  hydropneumothorax which may be caused by a bronchopleural fistula.     2. Free intraperitoneal air and ascites. This may be caused by a perforated  viscus. There is distended colon and several dilated small bowel loops in the  left upper quadrant.     3.  Metastatic retroperitoneal and pelvic adenopathy along with left adrenal  metastasis, metastasis dorsal to the right kidney and mesenteric metastasis in  the right lower quadrant.     4. Suspected osseous metastases within the pelvis. Patient had a right-sided chest tube placed in the emergency room by the ER physician. Discussed with the surgeon, patient, patient's mother. I explained the patient and patient mother on phone that with his cancer and metastasis it will be a difficult surgery and possibility that he may not make it. Patient wants to have surgery done. Patient will be going to surgery from ER. Review of Systems:  10 systems reviewed and negative except as noted in HPI. 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 Very guarded prognosis. Patient and patient's mother aware of patient present medical condition and the plan for surgery.     Hospital Problems as of 10/29/2021 Date Reviewed: 10/29/2021        Codes Class Noted - Resolved POA    Hydropneumothorax ICD-10-CM: J94.8  ICD-9-CM: 511.89  10/29/2021 - Present Unknown        Bowel perforation ([facility] ICD-10-CM: K63.1  ICD-9-CM: 569.83  10/29/2021 - Present Unknown        Severe sepsis ([facility] [address] ICD-10-CM: A41.9, R65.20  ICD-9-CM: 038.9, 995.92  10/29/2021 - Present Unknown        SCLC (small cell lung carcinoma) (HCC) ICD-10-CM: C34.90  ICD-9-CM: 162.9  10/29/2021 - Present Unknown        Acute respiratory failure with hypoxia (HCC) ICD-10-CM: J96.01  ICD-9-CM: 518.81  10/29/2021 - Present Unknown        Brain metastasis ([facility] ICD-10-CM: C79.31  ICD-9-CM: 198.3  10/18/2021 - Present Unknown              Past History:  Past Medical History:   Diagnosis Date    Cancer ([facility]     lung cancer    Chronic pain     headaches    Lung cancer ([facility] 9/29/2021    Nausea & vomiting 9/23/2021    Sinusitis 4/2010     taking abx    Trauma 9/19/2009    brain injury- left side affected-  bilat eye injuries, blind in left eye,  deaf in left ear,\"every bone in skull was broken\"     Past Surgical History:   Procedure Laterality Date    HX APPENDECTOMY      HX HEENT  9/2009    facial reconstruction post trauma      No Known Allergies   Social History     Tobacco Use    Smoking status: Current Every Day Smoker     Packs/day: 0.50     Years: 30.00     Pack years: 15.00    Smokeless tobacco: Never Used   Substance Use Topics    Alcohol use: Not Currently     Alcohol/week: 0.8 standard drinks     Types: 1 Cans of beer per week      Family History   Problem Relation Age of Onset    Diabetes Maternal Aunt     Diabetes Paternal Aunt       Family history reviewed and negative except as otherwise noted.     Immunization History   Administered Date(s) Administered    COVID-19, Moderna, Primary or Immunocompromised Series, MRNA, PF, 100mcg/0.5mL 04/23/2021, 05/21/2021    TB Skin Test (PPD) Intradermal 10/22/2021     Prior to Admit Medications:  Current Outpatient Medications   Medication Instructions    Advair Diskus 250-50 mcg/dose diskus inhaler 1 Puff, Inhalation, 2 TIMES DAILY    albuterol (PROVENTIL HFA, VENTOLIN HFA, PROAIR HFA) 90 mcg/actuation inhaler 1 Puff, Inhalation, EVERY 4 HOURS AS NEEDED    aspirin delayed-release 81 mg, Oral, DAILY, EC     baclofen 5 mg, Oral, 3 TIMES DAILY AS NEEDED    dexAMETHasone (DECADRON) 4 mg, Oral, EVERY 6 HOURS    fluticasone propionate (FLONASE) 50 mcg/actuation nasal spray 2 Sprays, Both Nostrils, DAILY    furosemide (LASIX) 20 mg, Oral, DAILY    gabapentin (NEURONTIN) 300 mg, Oral, 3 TIMES DAILY    HYDROcodone-acetaminophen (Norco)  mg tablet 1 Tablet, Oral, EVERY 6 HOURS AS NEEDED    magnesium oxide (MAG-OX) 400 mg, Oral, 4 TIMES DAILY    metoprolol tartrate (LOPRESSOR) 12.5 mg, Oral, 2 TIMES DAILY    mirtazapine (REMERON) 15 mg, Oral, EVERY BEDTIME    morphine CR (MS CONTIN) 30 mg, Oral, EVERY 8 HOURS    ondansetron hcl (ZOFRAN) 4 mg, Oral, EVERY 8 HOURS AS NEEDED    pantoprazole (PROTONIX) 40 mg, Oral, DAILY    potassium chloride SR (Klor-Con 10) 10 mEq tablet 10 mEq, Oral, DAILY    prochlorperazine (COMPAZINE) 10 mg, Oral, EVERY 6 HOURS AS NEEDED       Objective:     Patient Vitals for the past 24 hrs:   Temp Pulse Resp BP SpO2   10/29/21 1500  (!) 118  97/62 100 %   10/29/21 1331  100  (!) 107/58 100 %   10/29/21 1226  (!) 124  (!) 132/118 94 %   10/29/21 1128  (!) 121  (!) 165/94 99 %   10/29/21 1027  (!) 111  101/60 100 %   10/29/21 1022  (!) 132   96 %   10/29/21 0932 97.5 °F (36.4 °C) (!) 132 24 132/87 98 %     Oxygen Therapy  O2 Sat (%): 100 % (10/29/21 1500)  Pulse via Oximetry: 147 beats per minute (10/29/21 1128)  O2 Device: Nasal cannula (10/29/21 1500)  O2 Flow Rate (L/min): 2 l/min (10/29/21 1500)    Estimated body mass index is 15.76 kg/m² as calculated from the following:    Height as of this encounter: 5' 11\" (1.803 m). Weight as of this encounter: 51.3 kg (113 lb). No intake or output data in the 24 hours ending 10/29/21 1642      Physical Exam:    Blood pressure 97/62, pulse (!) 118, temperature 97.5 °F (36.4 °C), resp. rate 24, height 5' 11\" (1.803 m), weight 51.3 kg (113 lb), SpO2 100 %. General:    Thin built moderate respiratory distress, presently on oxygen using accessory muscles  Head:  Normocephalic, atraumatic  Eyesent:  Chronic left eye blind and left ear hearing loss   Neck:  No restricted ROM. Trachea midline   CV:   RRR. No m/r/g. No jugular venous distension.   Lungs:   Bilateral coarse breath sounds right greater than left, using extra muscles  Abdomen:   Tenderness all over no bowel sounds extremities: No cyanosis or clubbing. No edema  Skin:     No rashes and normal coloration. Warm and dry. Neuro:  CN II-XII grossly intact. Sensation intact. A&Ox3  Psych:  Normal mood and affect. I have reviewed ordered lab tests and independently visualized imaging below:    Last 24hr Labs:  Recent Results (from the past 24 hour(s))   CBC WITH AUTOMATED DIFF    Collection Time: 10/29/21  9:56 AM   Result Value Ref Range    WBC 8.6 4.3 - 11.1 K/uL    RBC 5.34 4.23 - 5.6 M/uL    HGB 12.8 (L) 13.6 - 17.2 g/dL    HCT 41.7 41.1 - 50.3 %    MCV 78.1 (L) 79.6 - 97.8 FL    MCH 24.0 (L) 26.1 - 32.9 PG    MCHC 30.7 (L) 31.4 - 35.0 g/dL    RDW 21.7 (H) 11.9 - 14.6 %    PLATELET 749 (H) 865 - 450 K/uL    MPV 9.4 9.4 - 12.3 FL    ABSOLUTE NRBC 0.11 0.0 - 0.2 K/uL    NEUTROPHILS 81 (H) 47 - 75 %    LYMPHOCYTES 7 (L) 16 - 44 %    MONOCYTES 4 3 - 9 %    METAMYELOCYTES 6 %    MYELOCYTES 2 %    ABS. NEUTROPHILS 7.7 1.7 - 8.2 K/UL    ABS. LYMPHOCYTES 0.6 0.5 - 4.6 K/UL    ABS. MONOCYTES 0.3 0.1 - 1.3 K/UL    RBC COMMENTS MODERATE  POLYCHROMASIA        PLATELET COMMENTS INCREASED      DF MANUAL     METABOLIC PANEL, COMPREHENSIVE    Collection Time: 10/29/21  9:56 AM   Result Value Ref Range    Sodium 130 (L) 136 - 145 mmol/L    Potassium 4.8 3.5 - 5.1 mmol/L    Chloride 92 (L) 98 - 107 mmol/L    CO2 18 (L) 21 - 32 mmol/L    Anion gap 20 (H) 7 - 16 mmol/L    Glucose 221 (H) 65 - 100 mg/dL    BUN 29 (H) 8 - 23 MG/DL    Creatinine 1.31 0.8 - 1.5 MG/DL    GFR est AA >60 >60 ml/min/1.73m2    GFR est non-AA 58 (L) >60 ml/min/1.73m2    Calcium 11.3 (H) 8.3 - 10.4 MG/DL    Bilirubin, total 1.0 0.2 - 1.1 MG/DL    ALT (SGPT) 32 12 - 65 U/L    AST (SGOT) 27 15 - 37 U/L    Alk.  phosphatase 81 50 - 136 U/L    Protein, total 8.6 (H) 6.3 - 8.2 g/dL    Albumin 2.4 (L) 3.2 - 4.6 g/dL    Globulin 6.2 (H) 2.3 - 3.5 g/dL    A-G Ratio 0.4 (L) 1.2 - 3.5     LACTIC ACID    Collection Time: 10/29/21  9:56 AM   Result Value Ref Range    Lactic acid 11.2 (HH) 0.4 - 2.0 MMOL/L   PROCALCITONIN    Collection Time: 10/29/21  9:56 AM   Result Value Ref Range    Procalcitonin 46.23 ng/mL   NT-PRO BNP    Collection Time: 10/29/21  9:56 AM   Result Value Ref Range    NT pro-BNP 1,198 (H) 5 - 125 PG/ML   LIPASE    Collection Time: 10/29/21  9:56 AM   Result Value Ref Range    Lipase 938 (H) 73 - 393 U/L   BLOOD GAS, ARTERIAL POC    Collection Time: 10/29/21  9:59 AM   Result Value Ref Range    Device: ROOM AIR      FIO2 (POC) 21 %    pH (POC) 7.45 7.35 - 7.45      pCO2 (POC) 22.8 (L) 35 - 45 MMHG    pO2 (POC) 87 75 - 100 MMHG    HCO3 (POC) 15.7 (L) 22 - 26 MMOL/L    sO2 (POC) 97.3 95 - 98 %    Base deficit (POC) 6.1 mmol/L    Allens test (POC) Positive      Site LEFT RADIAL      Specimen type (POC) ARTERIAL      Performed by Adela     CO2, POC 17 13 - 23 MMOL/L   URINALYSIS W/ RFLX MICROSCOPIC    Collection Time: 10/29/21 11:15 AM   Result Value Ref Range    Color YELLOW      Appearance CLOUDY      Specific gravity 1.018 1.001 - 1.023      pH (UA) 6.5 5.0 - 9.0      Protein Negative NEG mg/dL    Glucose Negative mg/dL    Ketone Negative NEG mg/dL    Bilirubin Negative NEG      Blood Negative NEG      Urobilinogen 0.2 0.2 - 1.0 EU/dL    Nitrites Negative NEG      Leukocyte Esterase Negative NEG     LACTIC ACID    Collection Time: 10/29/21  1:35 PM   Result Value Ref Range    Lactic acid 10.9 (HH) 0.4 - 2.0 MMOL/L   COVID-19 RAPID TEST    Collection Time: 10/29/21  3:17 PM   Result Value Ref Range    Specimen source NASAL SWAB      COVID-19 rapid test Not detected NOTD         All Micro Results     Procedure Component Value Units Date/Time    COVID-19 RAPID TEST [[id number]] Collected: 10/29/21 8354    Order Status: Completed Specimen: Nasopharyngeal Updated: 10/29/21 5697     Specimen source NASAL SWAB        COVID-19 rapid test Not detected        Comment:      The specimen is NEGATIVE for SARS-CoV-2, the novel coronavirus associated with COVID-19.   A negative result does not rule out COVID-19. This test has been authorized by the FDA under an Emergency Use Authorization (EUA) for use by authorized laboratories. Fact sheet for Healthcare Providers: ConventionUpdate.co.nz  Fact sheet for Patients: ConventionUpdate.co.nz       Methodology: Isothermal Nucleic Acid Amplification         CULTURE, BLOOD [[id number]] Collected: 10/29/21 1131    Order Status: Completed Specimen: Blood Updated: 10/29/21 1137    CULTURE, BLOOD [[id number]] Collected: 10/29/21 0956    Order Status: Completed Specimen: Blood Updated: 10/29/21 1004          Other Studies:  CT CHEST ABD PELV W CONT    Result Date: 10/29/2021  CT CHEST ABDOMEN AND PELVIS WITH CONTRAST 10/29/2021 HISTORY: Metastatic lung cancer. Worsening shortness of breath. Possible pneumoperitoneum on abdominal radiograph. TECHNIQUE: The patient received oral contrast and 100 mL Isovue-370 nonionic IV contrast. Axial images were obtained through the chest, abdomen and pelvis. Coronal reformatted images were generated. All CT scans at this facility used dose modulation, interactive reconstruction and/or weight based dosing when appropriate to reduce radiation dose to as low as reasonably achievable. COMPARISON: PET/CT 8/26/2021 and chest radiograph from the same day FINDINGS: CHEST: There is a large necrotic mass in the right lower lobe measuring at least 12.6 x 11.7 cm in axial dimensions (image 33). The right main pulmonary artery is encased by tumor and there appears to be tumoral invasion of the pericardial and heart with invasion of the SVC and right atrium. Tumor encases the right main bronchus and occludes the bronchus to the right lower lobe. There is a right sided hydropneumothorax that may be caused by bronchopleural fistula caused by necrotic tumor.  Metastatic adenopathy is present including a precarinal node that measures 1.7 cm in diameter (image 22) and right supraclavicular lower internal jugular chain adenopathy measuring up to 1.8 cm in diameter (image 7). There is faint infiltrate in the periphery of the left upper lobe (image 27). ABDOMEN: Free air is present. Ascites is present. Metastatic disease is present including a 2.4 cm left adrenal mass, a perinephric mass dorsal to the right kidney which measures 2 cm in diameter (image image 52), retroperitoneal adenopathy measuring up to 2.6 cm in diameter (image 82) and a necrotic mesenteric implant in the right lower quadrant measuring 4.9 cm in diameter (image 81). There is moderate distention of the ascending and transverse colon with abrupt transition to narrow caliber at the level of the splenic flexure. A small stone is present within the gallbladder. There are no focal hepatic masses. The pancreas and right adrenal gland are normal in appearance. There is no hydronephrosis. PELVIS: The bladder is normal in appearance. Left-sided pelvic adenopathy is present with a alena mass measuring up to 4.3 cm in diameter (image 23). Sclerotic lesions are present in the left ilium and sacrum, consistent with osseous metastatic disease. 1. Large right lower lobe tumor with extension into the heart and mediastinal structures, mediastinal and supraclavicular adenopathy, and right hydropneumothorax which may be caused by a bronchopleural fistula. 2. Free intraperitoneal air and ascites. This may be caused by a perforated viscus. There is distended colon and several dilated small bowel loops in the left upper quadrant. 3. Metastatic retroperitoneal and pelvic adenopathy along with left adrenal metastasis, metastasis dorsal to the right kidney and mesenteric metastasis in the right lower quadrant. 4. Suspected osseous metastases within the pelvis. Findings were discussed with ordering provider at the time time of original dictation.     XR CHEST PORT    Result Date: 10/29/2021  EXAM: XR CHEST PORT INDICATION: Pleural effusion COMPARISON: 10/29/2021 FINDINGS: A portable AP radiograph of the chest was obtained at 1619 hours. A right chest tube is in place. There is worsening atelectasis of the right lung. The left lung reveals linear atelectasis left upper lobe. . No other hardware. The cardiac and mediastinal contours and pulmonary vascularity are normal.  The bones and soft tissues are grossly within normal limits. Sitting atelectasis of the right lung. The patient has a known right basilar lung mass with metastatic lung carcinoma. XR CHEST PORT    Result Date: 10/29/2021  CHEST ONE VIEW HISTORY: Dyspnea; metastatic lung cancer COMPARISON: October 22, 2021 FINDINGS: A large right pleural effusion is present with atelectasis in the right lung base. An opacity is present in the periphery of the left midlung. There are multiple dilated air-filled loops of bowel in the upper abdomen along with gas in the upper abdomen that may be caused by pneumoperitoneum. 1. Right pleural effusion with atelectasis in the right lung base. 2. Opacity in the peripheral left along with nodular opacity in the periphery of the right lung. 3. Possible free intraperitoneal air with multiple dilated loops of bowel. Correlate with dedicated abdominal imaging. This could be further assessed with lateral decubitus views of the abdomen. Findings were sent to the ordering provider via secure text.       Medications Administered     HYDROmorphone (DILAUDID) injection 1 mg     Admin Date  10/29/2021 Action  Given Dose  1 mg Route  IntraVENous Administered By  Petey Bateman RN           Admin Date  10/29/2021 Action  Given Dose  1 mg Route  IntraVENous Administered By  Petey Bateman RN          iopamidoL (ISOVUE-370) 76 % injection 100 mL     Admin Date  10/29/2021 Action  Given Dose  100 mL Route  IntraVENous Administered By  Danielle QUARLES          ondansetron [facility]) injection 4 mg     Admin Date  10/29/2021 Action  Given Dose  4 mg Route  IntraVENous Administered By  Vielka Capps, RICH           Admin Date  10/29/2021 Action  Given Dose  4 mg Route  IntraVENous Administered By  Vielka Capps RN          piperacillin-tazobactam (ZOSYN) 4.5 g in 0.9% sodium chloride (MBP/ADV) 100 mL MBP     Admin Date  10/29/2021 Action  New Bag Dose  4.5 g Rate  200 mL/hr Route  IntraVENous Administered By  Vielka Capps RN          saline peripheral flush soln 10 mL     Admin Date  10/29/2021 Action  Given Dose  10 mL Route  InterCATHeter Administered By  Daquan QUARLES          sodium chloride 0.9 % bolus infusion 1,000 mL     Admin Date  10/29/2021 Action  New Bag Dose  1,000 mL Rate  1,000 mL/hr Route  IntraVENous Administered By  Vielka Capps RN           Admin Date  10/29/2021 Action  New Bag Dose  1,000 mL Rate  1,000 mL/hr Route  IntraVENous Administered By  Vielka Capps RN          sodium chloride 0.9 % bolus infusion 100 mL     Admin Date  10/29/2021 Action  New Bag Dose  100 mL Rate   Route  IntraVENous Administered By  Cruzito Palomino                Signed:  Wild Pierre MD

## 2021-10-29 NOTE — ANESTHESIA PREPROCEDURE EVALUATION
Anesthetic History     PONV          Review of Systems / Medical History  Patient summary reviewed and pertinent labs reviewed    Pulmonary          Shortness of breath and smoker         Neuro/Psych         Headaches     Cardiovascular                  Exercise tolerance: <4 METS     GI/Hepatic/Renal         Renal disease: ARF       Endo/Other        Cancer (lung)     Other Findings   Comments: Blind left eye  Deaf left ear    Acute renal failure  lung cancer with extensive mets           Physical Exam    Airway             Cardiovascular    Rhythm: regular  Rate: abnormal         Dental    Dentition: Poor dentition     Pulmonary      Decreased breath sounds          Comments: Tachypnea  Shallow breathing  ABG this am at 10 am Abdominal  GI exam deferred       Other Findings            Anesthetic Plan    ASA: 4

## 2021-10-29 NOTE — H&P
Surgery  H & P dictated. 65year old male with metastatic lung cancer who I was asked to see with a perforated viscus. A CT scan showed:     CT CHEST ABDOMEN AND PELVIS WITH CONTRAST 10/29/2021     HISTORY: Metastatic lung cancer. Worsening shortness of breath. Possible  pneumoperitoneum on abdominal radiograph.     TECHNIQUE: The patient received oral contrast and 100 mL Isovue-370 nonionic IV  contrast. Axial images were obtained through the chest, abdomen and pelvis. Coronal reformatted images were generated. All CT scans at this facility used  dose modulation, interactive reconstruction and/or weight based dosing when  appropriate to reduce radiation dose to as low as reasonably achievable.     COMPARISON: PET/CT 8/26/2021 and chest radiograph from the same day     FINDINGS:     CHEST: There is a large necrotic mass in the right lower lobe measuring at least  12.6 x 11.7 cm in axial dimensions (image 33). The right main pulmonary artery  is encased by tumor and there appears to be tumoral invasion of the pericardial  and heart with invasion of the SVC and right atrium. Tumor encases the right  main bronchus and occludes the bronchus to the right lower lobe.     There is a right sided hydropneumothorax that may be caused by bronchopleural  fistula caused by necrotic tumor. Metastatic adenopathy is present including a  precarinal node that measures 1.7 cm in diameter (image 22) and right  supraclavicular lower internal jugular chain adenopathy measuring up to 1.8 cm  in diameter (image 7).    There is faint infiltrate in the periphery of the left upper lobe (image 27).    ABDOMEN: Free air is present. Ascites is present.  Metastatic disease is present  including a 2.4 cm left adrenal mass, a perinephric mass dorsal to the right  kidney which measures 2 cm in diameter (image image 52), retroperitoneal  adenopathy measuring up to 2.6 cm in diameter (image 82) and a necrotic  mesenteric implant in the right lower quadrant measuring 4.9 cm in diameter  (image 81).    There is moderate distention of the ascending and transverse colon with abrupt  transition to narrow caliber at the level of the splenic flexure.     A small stone is present within the gallbladder. There are no focal hepatic  masses.     The pancreas and right adrenal gland are normal in appearance. There is no  hydronephrosis.     PELVIS: The bladder is normal in appearance. Left-sided pelvic adenopathy is  present with a alena mass measuring up to 4.3 cm in diameter (image 23).    Sclerotic lesions are present in the left ilium and sacrum, consistent with  osseous metastatic disease.        IMPRESSION     1. Large right lower lobe tumor with extension into the heart and mediastinal  structures, mediastinal and supraclavicular adenopathy, and right  hydropneumothorax which may be caused by a bronchopleural fistula.     2. Free intraperitoneal air and ascites. This may be caused by a perforated  viscus. There is distended colon and several dilated small bowel loops in the  left upper quadrant.     3. Metastatic retroperitoneal and pelvic adenopathy along with left adrenal  metastasis, metastasis dorsal to the right kidney and mesenteric metastasis in  the right lower quadrant.     4. Suspected osseous metastases within the pelvis. CVS: Sinus tachycardia, rate 124  Pulm: Tachypnea, rate 32, hypoxic  Abdomen: Distended, diffuse tenderness, peritoneal signs. Assessment: Acute appendicitis    Plan: Exploratory laparotomy, possible bowel resection, possible colostomy and left chest tube placement. The patient says he wants \"everything done. \" I told him he would remain intubated after the procedure. I told him I did not think he would survive the surgery.      Kurtis Arguello MD

## 2021-10-29 NOTE — ED NOTES
1634 CPR started by MD Norma Smith after loss of coconsciousness and no pulse  1436 Epi attempted push through Peripheral IV and IV blew  1437 Pulse check / negative pulse CPR continued  1638  IO insertion via MD Alvarez into L Tibia  1439 1 mg given via IO by RICH Powers  1640 Sodium Bicarb 8.4% 50 mL given via IO by RICH Powers   1640 Compressions switched from MD Alvarez to RICH Crockett  1641 Pulse check PEA MD Melendez  1641 8 Fr ET tube placed 23 at teeth  1642 shock with PEA MD Melendez 200 J  1643 BGL 41 By RICH Bee  1644 Amp D50 given RICH Lebron  1645 Pulse check PEA MD Melendez   1645 200 J MD Melendez  1646 Epi 1 mg and Calcium Chloride abj given RICH Bee  1648 D50 abj given RICH Bee  1649 200 J MD Melendez BGL check 72 RICH Bee  1650 Epi 1 mg RICH Bee  1651 ROSC MD Melendez  1653 Epi 1 mg given RICH Lebron   1654 Calcium Chloride and Sodium Bicarb abjs            167/101 BP from  A line  1654 150 mg Amiodarone given IO RICH Lebron via MD

## 2021-10-29 NOTE — PROGRESS NOTES
Surgery Addendum  I called the patient's mother, Karon Mooney, and told her that no surgery was planned as he would not survive being put to sleep. She understood. She had no questions for me.   Darline Hayden MD.

## 2021-10-29 NOTE — ED PROVIDER NOTES
Patient with metastatic lung cancer presents to the ER with complaints of worsening shortness of breath. Apparently he presented for radiation treatment, was found to be tachypneic and short of breath. EMS was called. When she was initially hypoxic and wheezing. Given nebulizer treatments in route. Patient denies any fevers. Patient's major complaint is \"pain all over\". The history is provided by the patient. Shortness of Breath  This is a recurrent problem. The problem occurs frequently. The current episode started more than 2 days ago. The problem has not changed since onset. Associated symptoms include cough. Pertinent negatives include no rhinorrhea, no sore throat, no wheezing, no chest pain, no leg pain and no leg swelling. He has had prior hospitalizations. Associated medical issues include chronic lung disease. Associated medical issues comments: Lung CA.         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, blind in left eye,  deaf in left ear,\"every bone in skull was broken\"       Past Surgical History:   Procedure Laterality Date    HX APPENDECTOMY      HX HEENT  9/2009    facial reconstruction post trauma         Family History:   Problem Relation Age of Onset    Diabetes Maternal Aunt     Diabetes Paternal Aunt        Social History     Socioeconomic History    Marital status:      Spouse name: Not on file    Number of children: Not on file    Years of education: Not on file    Highest education level: Not on file   Occupational History    Not on file   Tobacco Use    Smoking status: Current Every Day Smoker     Packs/day: 0.50     Years: 30.00     Pack years: 15.00    Smokeless tobacco: Never Used   Substance and Sexual Activity    Alcohol use: Not Currently     Alcohol/week: 0.8 standard drinks     Types: 1 Cans of beer per week    Drug use: No    Sexual activity: Not on file   Other Topics Concern     Service Not Asked    Blood Transfusions Not Asked    Caffeine Concern Not Asked    Occupational Exposure Not Asked    Hobby Hazards Not Asked    Sleep Concern Not Asked    Stress Concern Not Asked    Weight Concern Not Asked    Special Diet Not Asked    Back Care Not Asked    Exercise Not Asked    Bike Helmet Not Asked   [address] Not Asked    Self-Exams Not Asked   Social History Narrative    Not on file     Social Determinants of Health     Financial Resource Strain:     Difficulty of Paying Living Expenses:    Food Insecurity:     Worried About Running Out of Food in the Last Year:     [address] in the Last Year:    Transportation Needs:     Lack of Transportation (Medical):  Lack of Transportation (Non-Medical):    Physical Activity:     Days of Exercise per Week:     Minutes of Exercise per Session:    Stress:     Feeling of Stress :    Social Connections:     Frequency of Communication with Friends and Family:     Frequency of Social Gatherings with Friends and Family:     Attends [employer] Services:     Active Member of Clubs or Organizations:     Attends Club or Organization Meetings:     Marital Status:    Intimate Partner Violence:     Fear of Current or Ex-Partner:     Emotionally Abused:     Physically Abused:     Sexually Abused: ALLERGIES: Patient has no known allergies. Review of Systems   Constitutional: Positive for fatigue. HENT: Negative for congestion, dental problem, rhinorrhea and sore throat. Eyes: Negative for photophobia and redness. Respiratory: Positive for cough and shortness of breath. Negative for wheezing. Cardiovascular: Negative for chest pain and leg swelling. Neurological: Negative for syncope and weakness.    Hematological: Negative for adenopathy. Does not bruise/bleed easily. All other systems reviewed and are negative. There were no vitals filed for this visit. Physical Exam  Vitals and nursing note reviewed. Constitutional:       Appearance: Normal appearance. He is ill-appearing. HENT:      Head: Normocephalic and atraumatic. Right Ear: External ear normal.      Left Ear: External ear normal.      Nose: Nose normal.      Mouth/Throat:      Mouth: Mucous membranes are moist.      Pharynx: No oropharyngeal exudate or posterior oropharyngeal erythema. Eyes:      Extraocular Movements: Extraocular movements intact. Pupils: Pupils are equal, round, and reactive to light. Cardiovascular:      Rate and Rhythm: Regular rhythm. Tachycardia present. Pulses: Normal pulses. Heart sounds: Normal heart sounds. No murmur heard. Pulmonary:      Effort: Pulmonary effort is normal. Tachypnea present. Breath sounds: Examination of the right-lower field reveals decreased breath sounds. Decreased breath sounds, rhonchi and rales present. Abdominal:      General: Abdomen is flat. There is no distension. Palpations: Abdomen is soft. There is no mass. Tenderness: There is abdominal tenderness. Hernia: No hernia is present. Musculoskeletal:         General: No swelling or deformity. Cervical back: Normal range of motion and neck supple. No rigidity or tenderness. Right lower leg: No edema. Skin:     General: Skin is warm. Coloration: Skin is not jaundiced or pale. Findings: No bruising or erythema. Neurological:      General: No focal deficit present. Mental Status: He is alert and oriented to person, place, and time. Mental status is at baseline. Cranial Nerves: No cranial nerve deficit. Sensory: No sensory deficit.           MDM  Number of Diagnoses or Management Options  Cardiopulmonary arrest [facility])  Intestinal perforation ([address]  Primary malignant neoplasm of lung metastatic to other site, unspecified laterality ([facility] [address]  Sepsis with acute renal failure and septic shock, due to unspecified organism, unspecified acute renal failure type [facility])  Diagnosis management comments: Tachycardic and tachypneic here. Coarse breath sounds, somewhat diminished in right base  Apparently has history of recurrent malignant effusions as well    Differential diagnosis in this patient is broad. 10:30 AM  Chest x-ray shows right pleural effusion with atelectasis in the right lung base as well as possible free air under the diaphragm. Plan for CT scan of chest abdomen pelvis    10:48 AM  Critical lactic acid of 11 noted. Blood cultures have been obtained. Will start Zosyn. 1:36 PM  CT scan of abdomen pelvis is concerning for free air noted. Dilated colon as well. Ascites noted. Metastatic disease as well. Will discuss case with general surgery. 3:03 PM  Did inform patient of CT scan findings as well as the options for treatment. Discussed what aggressive care would entail include likely prolonged hospitalization, ICU stay and prolonged dependence on ventilator, also did discuss likely due to requiring feeding tube  Patient at this time has decided to proceed with surgical intervention. This was discussed with on-call general surgeon Dr. HENRIQUEZ [facility]. Will discuss case with hospitalist for admission    4:19 PM  With plans for surgery, anesthesia did request chest tube placement as patient has significant fluid on the right side of his chest and anticipating difficulty oxygenating patient. Chest tube was performed by myself.    5:26 PM  Patient subsequently went into cardiopulmonary arrest.  Had agonal breathing without a pulse. Proceeded with routinely ACLS protocols. Was given multiple rounds epinephrine. Was intubated by anesthesia at the bedside. Was found to have a blood sugar of 41. Was treated with 2 A of D50.   Subsequently went into what appeared to be ventricular fibrillation. Was shocked twice at 200 J. Did have return of spontaneous circulation. At this time patient remains critically ill. We will not be going to the operating room given his current cardiac status. He will be updated on care. Amount and/or Complexity of Data Reviewed  Clinical lab tests: ordered and reviewed  Tests in the radiology section of CPT®: ordered and reviewed  Review and summarize past medical records: yes  Discuss the patient with other providers: yes  Independent visualization of images, tracings, or specimens: yes (EKG interpretation: Sinus tachycardia, rate 127, normal axis, no blocks, no acute ST segment changes noted)    Risk of Complications, Morbidity, and/or Mortality  Presenting problems: high  Diagnostic procedures: moderate  Management options: high  General comments: Critical Care Time 120 Minutes: Excluding all billable procedures, time spent at the bedside directing patients resuscitation, updating family, and coordinating care with consultants      Patient Progress  Patient progress: (Guarded)         Chest Tube Insertion    Date/Time: 10/29/2021 4:18 PM  Performed by: Nima Mark MD  Authorized by: Nima Mark MD     Consent:     Consent obtained:  Emergent situation    Risks discussed:  Incomplete drainage and damage to surrounding structures    Alternatives discussed:  Delayed treatment  Pre-procedure details:     Skin preparation:  Betadine  Anesthesia (see MAR for exact dosages):      Anesthesia method:  Local infiltration    Local anesthetic:  Lidocaine 1% w/o epi  Procedure details:     Placement location:  R lateral    Tube size (Fr):  28    Dissection instrument:  Britney clamp and finger    Ultrasound guidance: no      Tension pneumothorax: no      Tube connected to:  Suction    Drainage characteristics:  Bloody and purulent    Suture material:  2-0 silk    Dressing:  4x4 sterile gauze and petrolatum-impregnated gauze  Post-procedure details: Post-insertion x-ray findings: tube in good position      Patient tolerance of procedure: Tolerated well, no immediate complications    Central Line    Date/Time: 10/29/2021 6:04 PM  Performed by: Antoine Ortiz MD  Authorized by: Antoine Ortiz MD     Consent:     Consent obtained:  Emergent situation    Consent given by:  Patient  Pre-procedure details:     Skin preparation:  Betadine  Anesthesia (see MAR for exact dosages): Anesthesia method:  None  Procedure details:     Location:  R femoral    Patient position:  Flat    Procedural supplies:  Triple lumen    Catheter size:  7 Fr    Ultrasound guidance: yes      Sterile ultrasound techniques: Sterile gel and sterile probe covers were used      Number of attempts:  1    Successful placement: yes    Post-procedure details:     Post-procedure:  Dressing applied (line not sutured due to patient coding.  )    Assessment:  Blood return through all ports and no pneumothorax on x-ray    Patient tolerance of procedure: Tolerated well, no immediate complications                   Results Include:    Recent Results (from the past 24 hour(s))   CBC WITH AUTOMATED DIFF    Collection Time: 10/29/21  9:56 AM   Result Value Ref Range    WBC 8.6 4.3 - 11.1 K/uL    RBC 5.34 4.23 - 5.6 M/uL    HGB 12.8 (L) 13.6 - 17.2 g/dL    HCT 41.7 41.1 - 50.3 %    MCV 78.1 (L) 79.6 - 97.8 FL    MCH 24.0 (L) 26.1 - 32.9 PG    MCHC 30.7 (L) 31.4 - 35.0 g/dL    RDW 21.7 (H) 11.9 - 14.6 %    PLATELET 851 (H) 166 - 450 K/uL    MPV 9.4 9.4 - 12.3 FL    ABSOLUTE NRBC 0.11 0.0 - 0.2 K/uL    NEUTROPHILS 81 (H) 47 - 75 %    LYMPHOCYTES 7 (L) 16 - 44 %    MONOCYTES 4 3 - 9 %    METAMYELOCYTES 6 %    MYELOCYTES 2 %    ABS. NEUTROPHILS 7.7 1.7 - 8.2 K/UL    ABS. LYMPHOCYTES 0.6 0.5 - 4.6 K/UL    ABS.  MONOCYTES 0.3 0.1 - 1.3 K/UL    RBC COMMENTS MODERATE  POLYCHROMASIA        PLATELET COMMENTS INCREASED      DF MANUAL     METABOLIC PANEL, COMPREHENSIVE    Collection Time: 10/29/21  9:56 AM   Result Value Ref Range    Sodium 130 (L) 136 - 145 mmol/L    Potassium 4.8 3.5 - 5.1 mmol/L    Chloride 92 (L) 98 - 107 mmol/L    CO2 18 (L) 21 - 32 mmol/L    Anion gap 20 (H) 7 - 16 mmol/L    Glucose 221 (H) 65 - 100 mg/dL    BUN 29 (H) 8 - 23 MG/DL    Creatinine 1.31 0.8 - 1.5 MG/DL    GFR est AA >60 >60 ml/min/1.73m2    GFR est non-AA 58 (L) >60 ml/min/1.73m2    Calcium 11.3 (H) 8.3 - 10.4 MG/DL    Bilirubin, total 1.0 0.2 - 1.1 MG/DL    ALT (SGPT) 32 12 - 65 U/L    AST (SGOT) 27 15 - 37 U/L    Alk.  phosphatase 81 50 - 136 U/L    Protein, total 8.6 (H) 6.3 - 8.2 g/dL    Albumin 2.4 (L) 3.2 - 4.6 g/dL    Globulin 6.2 (H) 2.3 - 3.5 g/dL    A-G Ratio 0.4 (L) 1.2 - 3.5     LACTIC ACID    Collection Time: 10/29/21  9:56 AM   Result Value Ref Range    Lactic acid 11.2 (HH) 0.4 - 2.0 MMOL/L   PROCALCITONIN    Collection Time: 10/29/21  9:56 AM   Result Value Ref Range    Procalcitonin 46.23 ng/mL   NT-PRO BNP    Collection Time: 10/29/21  9:56 AM   Result Value Ref Range    NT pro-BNP 1,198 (H) 5 - 125 PG/ML   LIPASE    Collection Time: 10/29/21  9:56 AM   Result Value Ref Range    Lipase 938 (H) 73 - 393 U/L   BLOOD GAS, ARTERIAL POC    Collection Time: 10/29/21  9:59 AM   Result Value Ref Range    Device: ROOM AIR      FIO2 (POC) 21 %    pH (POC) 7.45 7.35 - 7.45      pCO2 (POC) 22.8 (L) 35 - 45 MMHG    pO2 (POC) 87 75 - 100 MMHG    HCO3 (POC) 15.7 (L) 22 - 26 MMOL/L    sO2 (POC) 97.3 95 - 98 %    Base deficit (POC) 6.1 mmol/L    Allens test (POC) Positive      Site LEFT RADIAL      Specimen type (POC) ARTERIAL      Performed by Adela     CO2, POC 17 13 - 23 MMOL/L   URINALYSIS W/ RFLX MICROSCOPIC    Collection Time: 10/29/21 11:15 AM   Result Value Ref Range    Color YELLOW      Appearance CLOUDY      Specific gravity 1.018 1.001 - 1.023      pH (UA) 6.5 5.0 - 9.0      Protein Negative NEG mg/dL    Glucose Negative mg/dL    Ketone Negative NEG mg/dL    Bilirubin Negative NEG      Blood Negative NEG Urobilinogen 0.2 0.2 - 1.0 EU/dL    Nitrites Negative NEG      Leukocyte Esterase Negative NEG     LACTIC ACID    Collection Time: 10/29/21  1:35 PM   Result Value Ref Range    Lactic acid 10.9 (HH) 0.4 - 2.0 MMOL/L     Voice dictation software was used during the making of this note. This software is not perfect and grammatical and other typographical errors may be present. This note has been proofread, but may still contain errors. Sheryl Morelos MD; 10/29/2021 @3:05 PM   ===================================================================    I wore appropriate PPE throughout this patient's ED visit.  Sheryl Morelos MD, 3:05 PM

## 2021-10-29 NOTE — PROGRESS NOTES
Pt coded twice in er. No surgery at this time. Spoke to [facility], who spoke to pts mother  Presently pt on levophed and epi drip. Family want to try upto 3 pressors. Spoke to pts Mother Ms. Wili Wang and Niece Ms. Ollie Davis. Explained pts present guarded prognosis. Mother and niece made him DNR. Family live about 4 hrs away and they are waiting one other sister who lives 1 hr away from them. They said it would be atleast 5 to 6 hrs before they can be here and they are planning to come. Niece Ms. Levin Standard phone [phone]

## 2021-10-29 NOTE — ED NOTES
1740 Lost pulse compressions started by RICH Phillips  1741 1 mg of Epi abj given via Femoral central line by RICH Lanza  1743 D50 abj given via central line by RN Claudeen Lapidus ROSC MD Desean Quinonez

## 2021-10-29 NOTE — ED TRIAGE NOTES
Pt arrived via EMS from radiation clinic. Pt has Hx of brain and bone cancer. States pain to abdomen and chest started last night and \"got really bad as he was walking into the clinic. \" Masked

## 2021-10-29 NOTE — PROCEDURES
Arterial Line Placement    Start time: 10/29/2021 4:28 PM  End time: 10/29/2021 4:33 PM  Performed by: Rakan Arnold MD  Authorized by: Rakan Arnold MD     Pre-Procedure  Indications:  Arterial pressure monitoring and blood sampling  Preanesthetic Checklist: patient identified, patient being monitored, timeout performed and patient being monitored    Timeout Time: 16:28 EDT        Procedure:   Prep:  Chlorhexidine  Seldinger Technique?: Yes    Orientation:  Right  Location:  Brachical artery  Catheter size:  20 G  Number of attempts:  1  Cont Cardiac Output Sensor: No      Assessment:   Post-procedure:  Line secured  Comment:   Asked to place arterial line by ER after chest tube placement. They were unable to get a NIBP. Pt with tachypnea at start of procedure, then agonal breathing. Code called. Pt was being evaluated/optimized for surgery with Amanda Beal for exploratory lap.

## 2021-10-29 NOTE — ED NOTES
TRANSFER - OUT REPORT:    Verbal report given to Lila(name) on Maksim Coker  being transferred to [facility](unit) for routine post - op       Report consisted of patients Situation, Background, Assessment and   Recommendations(SBAR). Information from the following report(s) SBAR, OR Summary and MAR was reviewed with the receiving nurse. Lines:   Triple Lumen 10/29/21 Right Femoral (Active)       Peripheral IV 10/29/21 Right Antecubital (Active)       Arterial Line 10/29/21 Right (Active)       Intraosseous Line 10/29/21 Left;Tibia (Active)        Opportunity for questions and clarification was provided.       Patient transported with:   Registered Nurse Meche Eason and Jose Velez intubated with drips    Resp Tech

## 2021-10-29 NOTE — PROGRESS NOTES
[facility] Pharmacokinetic Monitoring Service - Vancomycin     Virgil Frausto is a 73 y.o. male starting on vancomycin therapy for intra-abdominal infection. Pharmacy consulted by Dr Jennifer Wellington for monitoring and adjustment. Target Concentration: Goal AUC/CHARBEL 400-600 mg*hr/L    Additional Antimicrobials: Zosyn    Pertinent Laboratory Values: Wt Readings from Last 1 Encounters:   10/29/21 51.3 kg (113 lb)     Temp Readings from Last 1 Encounters:   10/29/21 97.5 °F (36.4 °C)     No components found for: PROCAL  Recent Labs     10/29/21  1335 10/29/21  0956   BUN  --  29*   CREA  --  1.31   WBC  --  8.6   PCT  --  46.23   LAC 10.9* 11.2*     Estimated Creatinine Clearance: 40.8 mL/min (based on SCr of 1.31 mg/dL). No results found for: Hariniita Tristan    MRSA Nasal Swab: N/A. Non-respiratory infection. .    Plan:  Dosing recommendations based on Bayesian software  Start vancomycin 1000 mg q24 h  Anticipated AUC of 468 and trough concentration of 13.9 at steady state  Renal labs as indicated   Vancomycin concentration ordered for 10/30 @ 4 am   Pharmacy will continue to monitor patient and adjust therapy as indicated    Thank you for the consult,  Arjun De La Cruz, PHARMD, BCPS

## 2021-10-29 NOTE — PROGRESS NOTES
Surgery  The patient coded in the ED. HE is too unstable to undergo anesthesia/surgery. He would not withstand induction of anesthesia. Surgery cancelled.   Shayna Ball MD.

## 2021-10-29 NOTE — PROCEDURES
Emergent Intubation  Performed by: Lourdes Cordoba MD  Authorized by: Lourdes Cordoba MD     Emergent Intubation:   Location:  ED  Date/Time:  10/29/2021 4:41 PM  Indications:  Respiratory failure    Spontaneous Ventilation: absent    Level of Consciousness: unresponsive  Preoxygenated: Yes      Airway Documentation:   Airway:  ETT - Cuffed  Technique:  Direct laryngoscopy  Advanced Technique:  Glide scope  Insertion Site:  Oral  ETT size (mm):  8.0  ETT Line Yao:  Lips  ETT Insertion depth (cm):  23  Placement verified by: auscultation and EtCO2    Attempts:  2  Difficult airway: No    Intubated during code. No evidence of aspiration. Easy preintubation bag-mask airway with 1.0 FIO2. ETCO2 confirmed with color indicator. Breath sounds decreased on right with known effusion.

## 2021-10-30 PROCEDURE — 3331090002 HH PPS REVENUE DEBIT

## 2021-10-30 PROCEDURE — 3331090001 HH PPS REVENUE CREDIT

## 2021-10-30 NOTE — DISCHARGE SUMMARY
Hospitalist Discharge Summary for  Patient   Admit Date:  10/29/2021  9:28 AM   DC Note date: 10/29/2021  Name:  Zuleika Garcia   Age:  73 y.o. Sex:  male  :  1955   MRN:  [id number]   Room:  [facility]  PCP:  Indra Mcadams MD    Problem List for this Hospitalization:  Hospital Problems as of 10/29/2021 Date Reviewed: 10/29/2021        Codes Class Noted - Resolved POA    Hydropneumothorax ICD-10-CM: J94.8  ICD-9-CM: 511.89  10/29/2021 - Present Unknown        Bowel perforation ([facility] ICD-10-CM: K63.1  ICD-9-CM: 569.83  10/29/2021 - Present Unknown        Severe sepsis ([facility] ICD-10-CM: A41.9, R65.20  ICD-9-CM: 038.9, 995.92  10/29/2021 - Present Unknown        SCLC (small cell lung carcinoma) ([facility] ICD-10-CM: C34.90  ICD-9-CM: 162.9  10/29/2021 - Present Unknown        Acute respiratory failure with hypoxia ([facility] ICD-10-CM: J96.01  ICD-9-CM: 518.81  10/29/2021 - Present Unknown        Brain metastasis ([facility] ICD-10-CM: C79.31  ICD-9-CM: 198.3  10/18/2021 - Present Unknown            Did Patient have Sepsis (YES OR NO): yes    Hospital Course: This is a 28-year-old male with widely metastatic lung cancer who presented to the [address] emergency department from radiation oncology office. He went to his radiation oncology appointment this morning, and subsequently was found too sick to proceed, and sent to the ER for further evaluation. Work-up in the ER ultimately shows the patient to have free intraperitoneal air and ascites, found on CT scan. This is presumed caused by perforated viscus. CT scan also showed a right hydropneumothorax. Patient indicated that he wanted aggressive treatment. Surgery was consulted and agreed to take patient to the OR even though likelihood of success was very small. Anesthesia requested that a chest to be placed prior to surgery. ER placed chest tube.   Patient subsequently went into cardiopulmonary arrest.  ACLS protocol started, patient ultimately did have a return of spontaneous circulation. Patient now too unstable to consider surgery, he is intubated, he is admitted to ICU. Through efforts of ER staff and anesthesia, patient is ultimately intubated, chest tube placed, art line placed, and central line placed. He was then maxed out on Levophed and epinephrine drips. The hospitalist spoke to his family with update, and they made him a DNR. However, they live 4 to 5 hours away and are planning to come in, but it will be 5 to 6 hours before they can get here. A third pressor, vasopressin, was started during conversation with family. They do want to keep all current treatments going. But do not want to resume chest compressions or have another code run. Through the early evening patient continued to deteriorate with dropping blood pressures and oxygen levels. He also became hypothermic. He ultimately passed away at 2315. Also see notes by ER physician as well as surgery for further details. Time of Death:   2315    Cause of Death:   Septic shock due to bowel perforation    Time spent in patient discharge work and death certification 22 minutes. Procedures done this admission:  Procedure(s):  PROCEDURE CANCELLED - NOT PERFORMED    Consults this admission:  IP CONSULT TO PULMONOLOGY  IP CONSULT TO GENERAL SURGERY    Echocardiogram/EKG results:  No results found for this or any previous visit. EKG Results     None          Diagnostic Imaging/Tests:   CT CHEST ABD PELV W CONT    Result Date: 10/29/2021  CT CHEST ABDOMEN AND PELVIS WITH CONTRAST 10/29/2021 HISTORY: Metastatic lung cancer. Worsening shortness of breath. Possible pneumoperitoneum on abdominal radiograph. TECHNIQUE: The patient received oral contrast and 100 mL Isovue-370 nonionic IV contrast. Axial images were obtained through the chest, abdomen and pelvis. Coronal reformatted images were generated.   All CT scans at this facility used dose modulation, interactive reconstruction and/or weight based dosing when appropriate to reduce radiation dose to as low as reasonably achievable. COMPARISON: PET/CT 8/26/2021 and chest radiograph from the same day FINDINGS: CHEST: There is a large necrotic mass in the right lower lobe measuring at least 12.6 x 11.7 cm in axial dimensions (image 33). The right main pulmonary artery is encased by tumor and there appears to be tumoral invasion of the pericardial and heart with invasion of the SVC and right atrium. Tumor encases the right main bronchus and occludes the bronchus to the right lower lobe. There is a right sided hydropneumothorax that may be caused by bronchopleural fistula caused by necrotic tumor. Metastatic adenopathy is present including a precarinal node that measures 1.7 cm in diameter (image 22) and right supraclavicular lower internal jugular chain adenopathy measuring up to 1.8 cm in diameter (image 7). There is faint infiltrate in the periphery of the left upper lobe (image 27). ABDOMEN: Free air is present. Ascites is present. Metastatic disease is present including a 2.4 cm left adrenal mass, a perinephric mass dorsal to the right kidney which measures 2 cm in diameter (image image 52), retroperitoneal adenopathy measuring up to 2.6 cm in diameter (image 82) and a necrotic mesenteric implant in the right lower quadrant measuring 4.9 cm in diameter (image 81). There is moderate distention of the ascending and transverse colon with abrupt transition to narrow caliber at the level of the splenic flexure. A small stone is present within the gallbladder. There are no focal hepatic masses. The pancreas and right adrenal gland are normal in appearance. There is no hydronephrosis. PELVIS: The bladder is normal in appearance. Left-sided pelvic adenopathy is present with a alena mass measuring up to 4.3 cm in diameter (image 23). Sclerotic lesions are present in the left ilium and sacrum, consistent with osseous metastatic disease.      1. Large right lower lobe tumor with extension into the heart and mediastinal structures, mediastinal and supraclavicular adenopathy, and right hydropneumothorax which may be caused by a bronchopleural fistula. 2. Free intraperitoneal air and ascites. This may be caused by a perforated viscus. There is distended colon and several dilated small bowel loops in the left upper quadrant. 3. Metastatic retroperitoneal and pelvic adenopathy along with left adrenal metastasis, metastasis dorsal to the right kidney and mesenteric metastasis in the right lower quadrant. 4. Suspected osseous metastases within the pelvis. Findings were discussed with ordering provider at the time time of original dictation. XR CHEST PORT    Result Date: 10/29/2021  EXAM: XR CHEST PORT INDICATION: Respiratory failure COMPARISON: 10/29/2021 FINDINGS: A portable AP radiograph of the chest was obtained at 1860 hours. Endotracheal tube in adequate position. Right chest tube is in place. Right lower lobe lung mass and right lung atelectasis unchanged. . . The cardiac and mediastinal contours and pulmonary vascularity are normal.  The bones and soft tissues are grossly within normal limits. Endotracheal tube in adequate position. XR CHEST PORT    Result Date: 10/29/2021  EXAM: XR CHEST PORT INDICATION: Pleural effusion COMPARISON: 10/29/2021 FINDINGS: A portable AP radiograph of the chest was obtained at 1619 hours. A right chest tube is in place. There is worsening atelectasis of the right lung. The left lung reveals linear atelectasis left upper lobe. . No other hardware. The cardiac and mediastinal contours and pulmonary vascularity are normal.  The bones and soft tissues are grossly within normal limits. Sitting atelectasis of the right lung. The patient has a known right basilar lung mass with metastatic lung carcinoma.      XR CHEST PORT    Result Date: 10/29/2021  CHEST ONE VIEW HISTORY: Dyspnea; metastatic lung cancer COMPARISON: October 22, 2021 FINDINGS: A large right pleural effusion is present with atelectasis in the right lung base. An opacity is present in the periphery of the left midlung. There are multiple dilated air-filled loops of bowel in the upper abdomen along with gas in the upper abdomen that may be caused by pneumoperitoneum. 1. Right pleural effusion with atelectasis in the right lung base. 2. Opacity in the peripheral left along with nodular opacity in the periphery of the right lung. 3. Possible free intraperitoneal air with multiple dilated loops of bowel. Correlate with dedicated abdominal imaging. This could be further assessed with lateral decubitus views of the abdomen. Findings were sent to the ordering provider via secure text. All Micro Results     Procedure Component Value Units Date/Time    COVID-19 RAPID TEST [[id number]] Collected: 10/29/21 1517    Order Status: Completed Specimen: Nasopharyngeal Updated: 10/29/21 1546     Specimen source NASAL SWAB        COVID-19 rapid test Not detected        Comment:      The specimen is NEGATIVE for SARS-CoV-2, the novel coronavirus associated with COVID-19. A negative result does not rule out COVID-19. This test has been authorized by the FDA under an Emergency Use Authorization (EUA) for use by authorized laboratories.         Fact sheet for Healthcare Providers: ConventionUpdate.co.nz  Fact sheet for Patients: ConventionUpdate.co.nz       Methodology: Isothermal Nucleic Acid Amplification         CULTURE, BLOOD [[id number]] Collected: 10/29/21 1131    Order Status: Completed Specimen: Blood Updated: 10/29/21 1137    CULTURE, BLOOD [[id number]] Collected: 10/29/21 0956    Order Status: Completed Specimen: Blood Updated: 10/29/21 1004          Labs: Results:       BMP, Mg, Phos Recent Labs     10/29/21  2001 10/29/21  0956    130*   K 4.6 4.8    92*   CO2 12* 18*   AGAP 22* 20*   BUN 36* 29*   CREA 1.65* 1.31   CA 9.5 11.3* * 221*      CBC Recent Labs     10/29/21  2001 10/29/21  0956   WBC 6.4 8.6   RBC 3.74* 5.34   HGB 9.0* 12.8*   HCT 31.3* 41.7   * 873*   GRANS 50 81*   LYMPH 5* 7*   EOS 4  --    MONOS 10* 4   ANEU 5.2 7.7   ABL 0.3* 0.6   SAVANA 0.3  --    ABM 0.6 0.3      LFT Recent Labs     10/29/21  2001 10/29/21  0956   ALT 1,850* 32   AP 65 81   TP 4.6* 8.6*   ALB 1.3* 2.4*   GLOB 3.3 6.2*   AGRAT 0.4* 0.4*      Cardiac Testing No results found for: BNPP, BNP, CPK, RCK1, RCK2, RCK3, RCK4, CKMB, CKNDX, CKND1, TROPT, TROIQ   Coagulation Tests Lab Results   Component Value Date/Time    Prothrombin time 14.4 10/06/2021 03:00 PM    INR 1.1 10/06/2021 03:00 PM    aPTT 36.5 (H) 10/06/2021 03:00 PM      A1c No results found for: HBA1C, [id number]   Lipid Panel No results found for: CHOL, CHOLPOCT, CHOLX, CHLST, CHOLV, 909206, HDL, HDLP, LDL, LDLC, DLDLP, 967881, VLDLC, VLDL, TGLX, TRIGL, TRIGP, TGLPOCT, CHHD, [facility]   Thyroid Panel Lab Results   Component Value Date/Time    TSH 1.030 09/29/2021 08:08 AM    TSH 0.742 09/10/2021 10:50 AM        Most Recent UA Lab Results   Component Value Date/Time    Color YELLOW 10/29/2021 11:15 AM    Appearance CLOUDY 10/29/2021 11:15 AM    Specific gravity 1.018 10/29/2021 11:15 AM    pH (UA) 6.5 10/29/2021 11:15 AM    Protein Negative 10/29/2021 11:15 AM    Glucose Negative 10/29/2021 11:15 AM    Ketone Negative 10/29/2021 11:15 AM    Bilirubin Negative 10/29/2021 11:15 AM    Blood Negative 10/29/2021 11:15 AM    Urobilinogen 0.2 10/29/2021 11:15 AM    Nitrites Negative 10/29/2021 11:15 AM    Leukocyte Esterase Negative 10/29/2021 11:15 AM          All Labs from Last 24 Hrs:  Recent Results (from the past 24 hour(s))   CBC WITH AUTOMATED DIFF    Collection Time: 10/29/21  9:56 AM   Result Value Ref Range    WBC 8.6 4.3 - 11.1 K/uL    RBC 5.34 4.23 - 5.6 M/uL    HGB 12.8 (L) 13.6 - 17.2 g/dL    HCT 41.7 41.1 - 50.3 %    MCV 78.1 (L) 79.6 - 97.8 FL    MCH 24.0 (L) 26.1 - 32.9 PG    MCHC 30.7 (L) 31.4 - 35.0 g/dL    RDW 21.7 (H) 11.9 - 14.6 %    PLATELET 241 (H) 704 - 450 K/uL    MPV 9.4 9.4 - 12.3 FL    ABSOLUTE NRBC 0.11 0.0 - 0.2 K/uL    NEUTROPHILS 81 (H) 47 - 75 %    LYMPHOCYTES 7 (L) 16 - 44 %    MONOCYTES 4 3 - 9 %    METAMYELOCYTES 6 %    MYELOCYTES 2 %    ABS. NEUTROPHILS 7.7 1.7 - 8.2 K/UL    ABS. LYMPHOCYTES 0.6 0.5 - 4.6 K/UL    ABS. MONOCYTES 0.3 0.1 - 1.3 K/UL    RBC COMMENTS MODERATE  POLYCHROMASIA        PLATELET COMMENTS INCREASED      DF MANUAL     METABOLIC PANEL, COMPREHENSIVE    Collection Time: 10/29/21  9:56 AM   Result Value Ref Range    Sodium 130 (L) 136 - 145 mmol/L    Potassium 4.8 3.5 - 5.1 mmol/L    Chloride 92 (L) 98 - 107 mmol/L    CO2 18 (L) 21 - 32 mmol/L    Anion gap 20 (H) 7 - 16 mmol/L    Glucose 221 (H) 65 - 100 mg/dL    BUN 29 (H) 8 - 23 MG/DL    Creatinine 1.31 0.8 - 1.5 MG/DL    GFR est AA >60 >60 ml/min/1.73m2    GFR est non-AA 58 (L) >60 ml/min/1.73m2    Calcium 11.3 (H) 8.3 - 10.4 MG/DL    Bilirubin, total 1.0 0.2 - 1.1 MG/DL    ALT (SGPT) 32 12 - 65 U/L    AST (SGOT) 27 15 - 37 U/L    Alk.  phosphatase 81 50 - 136 U/L    Protein, total 8.6 (H) 6.3 - 8.2 g/dL    Albumin 2.4 (L) 3.2 - 4.6 g/dL    Globulin 6.2 (H) 2.3 - 3.5 g/dL    A-G Ratio 0.4 (L) 1.2 - 3.5     LACTIC ACID    Collection Time: 10/29/21  9:56 AM   Result Value Ref Range    Lactic acid 11.2 (HH) 0.4 - 2.0 MMOL/L   PROCALCITONIN    Collection Time: 10/29/21  9:56 AM   Result Value Ref Range    Procalcitonin 46.23 ng/mL   NT-PRO BNP    Collection Time: 10/29/21  9:56 AM   Result Value Ref Range    NT pro-BNP 1,198 (H) 5 - 125 PG/ML   LIPASE    Collection Time: 10/29/21  9:56 AM   Result Value Ref Range    Lipase 938 (H) 73 - 393 U/L   BLOOD GAS, ARTERIAL POC    Collection Time: 10/29/21  9:59 AM   Result Value Ref Range    Device: ROOM AIR      FIO2 (POC) 21 %    pH (POC) 7.45 7.35 - 7.45      pCO2 (POC) 22.8 (L) 35 - 45 MMHG    pO2 (POC) 87 75 - 100 MMHG    HCO3 (POC) 15.7 (L) 22 - 26 MMOL/L sO2 (POC) 97.3 95 - 98 %    Base deficit (POC) 6.1 mmol/L    Allens test (POC) Positive      Site LEFT RADIAL      Specimen type (POC) ARTERIAL      Performed by SecurantKatty     CO2, POC 17 13 - 23 MMOL/L   URINALYSIS W/ RFLX MICROSCOPIC    Collection Time: 10/29/21 11:15 AM   Result Value Ref Range    Color YELLOW      Appearance CLOUDY      Specific gravity 1.018 1.001 - 1.023      pH (UA) 6.5 5.0 - 9.0      Protein Negative NEG mg/dL    Glucose Negative mg/dL    Ketone Negative NEG mg/dL    Bilirubin Negative NEG      Blood Negative NEG      Urobilinogen 0.2 0.2 - 1.0 EU/dL    Nitrites Negative NEG      Leukocyte Esterase Negative NEG     LACTIC ACID    Collection Time: 10/29/21  1:35 PM   Result Value Ref Range    Lactic acid 10.9 (HH) 0.4 - 2.0 MMOL/L   COVID-19 RAPID TEST    Collection Time: 10/29/21  3:17 PM   Result Value Ref Range    Specimen source NASAL SWAB      COVID-19 rapid test Not detected NOTD     BLOOD GAS, ARTERIAL POC    Collection Time: 10/29/21  4:34 PM   Result Value Ref Range    Device: NASAL CANNULA      FIO2 (POC) 40 %    pH (POC) 7.31 (L) 7.35 - 7.45      pCO2 (POC) 17.6 (LL) 35 - 45 MMHG    pO2 (POC) 160 (H) 75 - 100 MMHG    HCO3 (POC) 8.9 (L) 22 - 26 MMOL/L    sO2 (POC) 99.3 (H) 95 - 98 %    Base deficit (POC) 14.8 mmol/L    Specimen type (POC) ARTERIAL      Performed by Adela     Respiratory comment: [email or web address]    GLUCOSE, POC    Collection Time: 10/29/21  4:42 PM   Result Value Ref Range    Glucose (POC) 41 (L) 65 - 100 mg/dL    Performed by Moreno    METABOLIC PANEL, COMPREHENSIVE    Collection Time: 10/29/21  8:01 PM   Result Value Ref Range    Sodium 139 136 - 145 mmol/L    Potassium 4.6 3.5 - 5.1 mmol/L    Chloride 105 98 - 107 mmol/L    CO2 12 (L) 21 - 32 mmol/L    Anion gap 22 (H) 7 - 16 mmol/L    Glucose 245 (H) 65 - 100 mg/dL    BUN 36 (H) 8 - 23 MG/DL    Creatinine 1.65 (H) 0.8 - 1.5 MG/DL    GFR est AA 54 (L) >60 ml/min/1.73m2    GFR est non-AA 45 (L) >60 ml/min/1.73m2    Calcium 9.5 8.3 - 10.4 MG/DL    Bilirubin, total 0.9 0.2 - 1.1 MG/DL    ALT (SGPT) 1,850 (H) 12 - 65 U/L    AST (SGOT) 2,487 (H) 15 - 37 U/L    Alk. phosphatase 65 50 - 136 U/L    Protein, total 4.6 (L) 6.3 - 8.2 g/dL    Albumin 1.3 (L) 3.2 - 4.6 g/dL    Globulin 3.3 2.3 - 3.5 g/dL    A-G Ratio 0.4 (L) 1.2 - 3.5     CBC WITH AUTOMATED DIFF    Collection Time: 10/29/21  8:01 PM   Result Value Ref Range    WBC 6.4 4.3 - 11.1 K/uL    RBC 3.74 (L) 4.23 - 5.6 M/uL    HGB 9.0 (L) 13.6 - 17.2 g/dL    HCT 31.3 (L) 41.1 - 50.3 %    MCV 83.7 79.6 - 97.8 FL    MCH 24.1 (L) 26.1 - 32.9 PG    MCHC 28.8 (L) 31.4 - 35.0 g/dL    RDW 21.1 (H) 11.9 - 14.6 %    PLATELET 725 (H) 020 - 450 K/uL    MPV 9.2 (L) 9.4 - 12.3 FL    ABSOLUTE NRBC 0.15 0.0 - 0.2 K/uL    NEUTROPHILS 50 47 - 75 %    BAND NEUTROPHILS 25 (H) 0 - 10 %    LYMPHOCYTES 5 (L) 16 - 44 %    MONOCYTES 10 (H) 3 - 9 %    EOSINOPHILS 4 1 - 8 %    METAMYELOCYTES 5 %    MYELOCYTES 1 %    ABS. NEUTROPHILS 5.2 1.7 - 8.2 K/UL    ABS. LYMPHOCYTES 0.3 (L) 0.5 - 4.6 K/UL    ABS. MONOCYTES 0.6 0.1 - 1.3 K/UL    ABS.  EOSINOPHILS 0.3 0.0 - 0.8 K/UL    RBC COMMENTS NRBCs SEEN ON SMEAR  MODERATE  HYPOCHROMIA       RBC COMMENTS SLIGHT  POLYCHROMASIA        RBC COMMENTS SLIGHT  SCHISTOCYTES        RBC COMMENTS SLIGHT  ANISOCYTOSIS + POIKILOCYTOSIS        WBC COMMENTS Result Confirmed By Smear      PLATELET COMMENTS INCREASED      DF AUTOMATED     BLOOD GAS, ARTERIAL POC    Collection Time: 10/29/21  8:18 PM   Result Value Ref Range    Device: ADULT VENT      FIO2 (POC) 50 %    pH (POC) 7.13 (LL) 7.35 - 7.45      pCO2 (POC) 23.3 (L) 35 - 45 MMHG    pO2 (POC) 246 (H) 75 - 100 MMHG    HCO3 (POC) 7.7 (L) 22 - 26 MMOL/L    sO2 (POC) 99.7 (H) 95 - 98 %    Base deficit (POC) 19.9 mmol/L    Mode ASSIST CONTROL      Tidal volume 470 ml    PEEP/CPAP (POC) 8 cmH2O    Mean Airway Pressure 15 cmH2O    PIP (POC) 21      Allens test (POC) NOT APPLICABLE      Inspiratory Time 0.9 sec    Site DRAWN FROM ARTERIAL LINE      Specimen type (POC) ARTERIAL      Performed by NewtonTracyRRT     Critical value read back LASEE     Respiratory comment: VE17.9    GLUCOSE, POC    Collection Time: 10/29/21  9:20 PM   Result Value Ref Range    Glucose (POC) 141 (H) 65 - 100 mg/dL    Performed by Maxime Trammell    LACTIC ACID    Collection Time: 10/29/21  9:57 PM   Result Value Ref Range    Lactic acid 14.1 (HH) 0.4 - 2.0 MMOL/L   BLOOD GAS, ARTERIAL POC    Collection Time: 10/29/21 11:06 PM   Result Value Ref Range    Device: ADULT VENT      FIO2 (POC) 100 %    pH (POC) 7.06 (LL) 7.35 - 7.45      pCO2 (POC) 19.1 (LL) 35 - 45 MMHG    pO2 (POC) 331 (H) 75 - 100 MMHG    HCO3 (POC) 5.4 (L) 22 - 26 MMOL/L    sO2 (POC) 99.8 (H) 95 - 98 %    Base deficit (POC) 23.2 mmol/L    Mode ASSIST CONTROL      Tidal volume 470 ml    PEEP/CPAP (POC) 8 cmH2O    Mean Airway Pressure 15 cmH2O    PIP (POC) 24      Allens test (POC) NOT APPLICABLE      Inspiratory Time 0.9 sec    Site DRAWN FROM ARTERIAL LINE      Specimen type (POC) ARTERIAL      Performed by NewtonTracyRRT     Critical value read back LASEE     Respiratory comment: VE15.6        Current Med List in Hospital:   Current Facility-Administered Medications   Medication Dose Route Frequency    diatrizoate kyle-diatrizoat sod (MD-GASTROVIEW,GASTROGRAFIN) 66-10 % contrast solution 15 mL  15 mL Oral RAD ONCE    sodium chloride 0.9 % bolus infusion 1,000 mL  1,000 mL IntraVENous ONCE    sodium chloride (NS) flush 5-40 mL  5-40 mL IntraVENous Q8H    sodium chloride (NS) flush 5-40 mL  5-40 mL IntraVENous PRN    ondansetron (ZOFRAN ODT) tablet 4 mg  4 mg Oral Q8H PRN    Or    ondansetron (ZOFRAN) injection 4 mg  4 mg IntraVENous Q6H PRN    0.9% sodium chloride infusion  150 mL/hr IntraVENous CONTINUOUS    vancomycin (VANCOCIN) 1,000 mg in 0.9% sodium chloride 250 mL (VIAL-MATE)  1,000 mg IntraVENous Q24H    piperacillin-tazobactam (ZOSYN) 4.5 g in 0.9% sodium chloride (MBP/ADV) 100 mL MBP  4.5 g IntraVENous Q8H    EPINEPHrine (ADRENALIN) 4 mg in 0.9% sodium chloride 250 mL infusion  1-10 mcg/min IntraVENous TITRATE    NOREPINephrine (LEVOPHED) 4 mg/250 mL (16 mcg/mL) in NS infusion  0.5-30 mcg/min IntraVENous TITRATE    vasopressin (VASOSTRICT) 20 Units in 0.9% sodium chloride 100 mL infusion  0-0.1 Units/min IntraVENous TITRATE       No Known Allergies  Immunization History   Administered Date(s) Administered    COVID-19, Moderna, Primary or Immunocompromised Series, MRNA, PF, 100mcg/0.5mL 04/23/2021, 05/21/2021    TB Skin Test (PPD) Intradermal 10/22/2021       Recent Vital Data:  Patient Vitals for the past 24 hrs:   Temp Pulse Resp BP SpO2   10/29/21 2315  (!) 0 16     10/29/21 2300 97.3 °F (36.3 °C) (!) 117 (!) 32 (!) 107/44    10/29/21 2245  (!) 118 (!) 33  (!) 87 %   10/29/21 2230  (!) 120 (!) 35  (!) 83 %   10/29/21 2215  (!) 122 (!) 39  98 %   10/29/21 2200  (!) 123 (!) 38  100 %   10/29/21 2145  (!) 123 (!) 38  100 %   10/29/21 2130  (!) 123 (!) 39  100 %   10/29/21 2115  (!) 127 (!) 37  100 %   10/29/21 2100  (!) 126 (!) 38  100 %   10/29/21 2045  (!) 124 (!) 37  100 %   10/29/21 2030  (!) 122 (!) 37  100 %   10/29/21 2016  (!) 119 (!) 36  100 %   10/29/21 2015  (!) 118 (!) 37  100 %   10/29/21 2000  (!) 116 (!) 36  100 %   10/29/21 1945  (!) 115 (!) 36  100 %   10/29/21 1930  (!) 109 (!) 35 (!) 72/42 (!) 72 %   10/29/21 1915  (!) 107 (!) 37  100 %   10/29/21 1900  (!) 105 (!) 35  (!) 78 %   10/29/21 1834     100 %   10/29/21 1833     100 %   10/29/21 1832     100 %   10/29/21 1831     100 %   10/29/21 1830     100 %   10/29/21 1829     100 %   10/29/21 1828     100 %   10/29/21 1827     100 %   10/29/21 1748  (!) 107 20  97 %   10/29/21 1739  (!) 17  (!) 79/48    10/29/21 1726  (!) 112  (!) 91/55    10/29/21 1724  (!) 118  (!) 81/50    10/29/21 1718  (!) 107  (!) 87/48  10/29/21 1500  (!) 118  97/62 100 %   10/29/21 1331  100  (!) 107/58 100 %   10/29/21 1226  (!) 124  (!) 132/118 94 %   10/29/21 1128  (!) 121  (!) 165/94 99 %   10/29/21 1027  (!) 111  101/60 100 %   10/29/21 1022  (!) 132   96 %   10/29/21 0932 97.5 °F (36.4 °C) (!) 132 24 132/87 98 %     Oxygen Therapy  O2 Sat (%): (!) 87 % (10/29/21 2245)  Pulse via Oximetry: 95 beats per minute (10/29/21 2245)  O2 Device: Endotracheal tube;Ventilator (10/29/21 2300)  O2 Flow Rate (L/min): 2 l/min (10/29/21 1500)  FIO2 (%): 60 % (10/29/21 2300)    Estimated body mass index is 15.76 kg/m² as calculated from the following:    Height as of this encounter: 5' 11\" (1.803 m). Weight as of this encounter: 51.3 kg (113 lb). No intake or output data in the 24 hours ending 10/29/21 2334      Discharge Exam:  General: Lifeless  Eyes: Pupils fixed/nonreactive  Lungs: No breath sounds  Heart:  No heart sounds  Neurologic: unresponsive    Signed:  [place] Reyes MD    Part of this note may have been written by using a voice dictation software. The note has been proof read but may still contain some grammatical/other typographical errors.

## 2021-10-30 NOTE — H&P
[facility]  HISTORY AND PHYSICAL    Name:  Noemi Spence  MR#:  [id number]  :  1955  ACCOUNT #:  [de-identified]  ADMIT DATE:  10/29/2021    REASON FOR ADMISSION:  Perforated viscus. HISTORY:  This is a 60-year-old male with metastatic lung cancer who was seen in the [address] emergency department with worsening shortness of breath. He apparently was at [facility] earlier this morning, developed shortness of breath, was sent via ambulance to the [address] emergency department. The patient has a CT scan done that showed multiple findings. He has a large necrotic mass in the right lower lobe measuring at least 12.6 x 11.7 cm. The right main pulmonary artery is encased by tumor, and there appears to be tumoral invasion of the pericardium and heart with invasion of the SVC and right atrium. The tumor encases the right mainstem bronchus and includes the bronchus of the right lower lobe. 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 He has metastatic adenopathy throughout his chest.  The abdominal portion of the CT scan showed free air with ascites. He has metastatic disease present in the left adrenal gland, right perinephric mass, retroperitoneal adenopathy, and necrotic mesenteric implant in the right lower quadrant measuring 4.9 cm in diameter. He has distention of the ascending and transverse colon with a transition to a narrow caliber at the level of the splenic flexure. He has osseous metastases within the pelvis. I was asked by Dr. Abdi Fabian to see the patient. I was operating at the [facility] and asked Dr. Abdi Fabian to go in and speak to the patient in regards to performing surgery as I did not think that this would be a life-saving procedure, might actually be a life-ending procedure. Dr. Abdi Fabian went in and spoke to the patient twice. He is one of the emergency department doctors. He said the patient wanted everything done.   The hospitalist also came in and admitted the patient and spoke to the patient. He also reiterated that he wanted everything done even though this is likely a fruitless exercise. Dr. Marylene Cain, who is the anesthesiologist, evaluated him and felt that he was too unstable to transfer to the [facility] and said that if surgery was going to be done, we would do it at the [facility]OSP[facility] [address] and worry about transfer later. He had a chest tube placed by Dr. Bella Raymundo in the emergency department. I came in and spoke to the patient and he is tachypneic, hypoxic even despite oxygen. He is so tachypneic he had difficulty speaking, but he said that he wanted everything done and he wanted to proceed with surgery. I told him that I thought the mortality rate was close to a 100% that he would end up with an endotracheal tube in place on a ventilator, possibly with a colostomy and a large midline incision. Also, he would require a left chest tube. The patient wanted to proceed. Once everything done, his mother who is his only relative was contacted and she likewise would like everything done. PAST MEDICAL HISTORY:  He has metastatic lung cancer. He has chronic headaches, sinusitis. 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 PAST SURGICAL HISTORY:  He had an appendectomy and facial reconstruction after trauma in 2009. FAMILY HISTORY:  Maternal and paternal aunts both had diabetes. SOCIAL HISTORY:  He is . He did smoke half a pack of cigarettes for 30 years. He drinks 1 can of beer per week at the present time. ALLERGIES:  NO KNOWN DRUG ALLERGIES. PHYSICAL EXAMINATION:  GENERAL APPEARANCE:  He is a cachectic, tachypneic,  male, presently with oxygen in place. He is in bed 7 in the [facility] [address] [place] [address] emergency department. The patient is awake and repeating that he wants everything done. VITAL SIGNS:  Pulse is 118, BP is 97/62, temperature is 97.5.   HEART: Sinus tachycardia, rate 124 by my count. LUNGS:  He has coarse breath sounds bilaterally. He is tachypneic. He has reddish brown material coming from the chest tube that was placed by Dr. Adela Stanley in the emergency department. ABDOMEN:  Distended, rigid, peritoneal signs, hypoactive bowel sounds. BLOOD WORK:  Lactic acid is 10.9, procalcitonin is 46.23. The white count is 8.6, H and H 12.8/41.7, and platelet count is 735. The sodium is 130, potassium 4.8, chloride 92, bicarb 18, glucose 221, BUN 29, creatinine 1.31.    ASSESSMENT:  Perforated viscus in a gentleman with metastatic lung cancer that is unresectable and not able to be treated. He is in dire straits. At this point, he would like everything done. We will proceed with an exploratory laparotomy; although, I think that he has a high chance for mortality both during surgery or slightly after I told him he will not be extubated, he may end up with a colostomy. He is adamant that everything be done so we will proceed at his request.  I personally think this is a fruitless exercise, but I will proceed as per the patient's wishes.       MD LUIS [id number]  D:  10/29/2021 16:39  T:  10/29/2021 22:10  JOB #:  [id number]

## 2021-10-30 NOTE — PROGRESS NOTES
Called by RN to pronounce death. RN noted no pulse and no respirations at 2315.   I confirm no pulse and no respirations, time of death 4312  Natural causes  See discharge summary for details    Nic Recio MD

## 2021-10-31 PROCEDURE — 3331090001 HH PPS REVENUE CREDIT

## 2021-10-31 PROCEDURE — 3331090002 HH PPS REVENUE DEBIT

## 2021-11-01 ENCOUNTER — APPOINTMENT (OUTPATIENT)
Dept: RADIATION ONCOLOGY | Age: 66
End: 2021-11-01

## 2021-11-01 PROCEDURE — 3331090002 HH PPS REVENUE DEBIT

## 2021-11-01 PROCEDURE — 3331090001 HH PPS REVENUE CREDIT

## 2021-11-02 ENCOUNTER — APPOINTMENT (OUTPATIENT)
Dept: RADIATION ONCOLOGY | Age: 66
End: 2021-11-02

## 2021-11-02 PROCEDURE — 400018 HH-NO PAY CLAIM PROCEDURE

## 2021-11-03 ENCOUNTER — APPOINTMENT (OUTPATIENT)
Dept: RADIATION ONCOLOGY | Age: 66
End: 2021-11-03

## 2021-11-03 LAB
BACTERIA SPEC CULT: NORMAL
BACTERIA SPEC CULT: NORMAL
SERVICE CMNT-IMP: NORMAL
SERVICE CMNT-IMP: NORMAL

## 2021-11-04 ENCOUNTER — HOME CARE VISIT (OUTPATIENT)
Dept: HOME HEALTH SERVICES | Facility: HOME HEALTH | Age: 66
End: 2021-11-04
Payer: MEDICARE

## 2021-11-05 VITALS
SYSTOLIC BLOOD PRESSURE: 110 MMHG | DIASTOLIC BLOOD PRESSURE: 70 MMHG | TEMPERATURE: 97.5 F | RESPIRATION RATE: 16 BRPM | HEART RATE: 90 BPM | OXYGEN SATURATION: 98 %

## 2022-06-28 NOTE — H&P
Hospitalist History and Physical   Admit Date:  10/6/2021  2:29 PM   Name:  Tanja Fermin   Age:  73 y.o. Sex:  male  :  1955   MRN:  [id number]   Room:  [place] [address]    Presenting Complaint: Shortness of Breath    Reason(s) for Admission: Recurrent right pleural effusion [J90]     History of Present Illness:   Tanja Fermin is a 73 y.o. male with medical history of SCLC on R side with recurrent R pleural effusion who presented with SOB. Onset a few days ago, constant, progressively worsening to point where currently he cannot make it all the way up his steps. A/w fatigue, nausea, vomiting. No fevers, CP, abd pain, urinary symptoms    Review of Systems:  10 systems reviewed and negative except as noted in HPI. Assessment & Plan:         Recurrent right pleural effusion (10/6/2021)    -Consult pulm for thora.    -on lasix at home, unclear if this will be effective even at a higher dose. May need other long term solution; e.g. scheduled thoras.   pleurX typically only for hospice      Small cell lung carcinoma, right ([facility] (2021)    -oncology consulted      Severe protein-calorie malnutrition ([facility] (10/1/2021)    -nutritional supplments      Dispo/Discharge Planning:     Likely home at discharge    Diet: ADULT ORAL NUTRITION SUPPLEMENT AM Snack, PM Snack, HS Snack; Standard High Calorie/High Protein  VTE ppx: lovenox  Code status: Prior    Hospital Problems as of 10/6/2021 Date Reviewed: 2021        Codes Class Noted - Resolved POA    * (Principal) Recurrent right pleural effusion ICD-10-CM: J90  ICD-9-CM: 511.9  10/6/2021 - Present Yes        Severe protein-calorie malnutrition (HCC) (Chronic) ICD-10-CM: E76  ICD-9-CM: 262  10/1/2021 - Present Yes        Small cell lung carcinoma, right (HCC) (Chronic) ICD-10-CM: C34.91  ICD-9-CM: 162.9  2021 - Present Yes              Past Medical History:   Diagnosis Date    Cancer ([facility]     lung cancer    Chronic pain     headaches    Lung [facility] ([facility] 9/29/2021    Nausea & vomiting 9/23/2021    Sinusitis 4/2010     taking abx    Trauma 9/19/2009    brain injury- left side affected-  bilat eye injuries, blind in left eye,  deaf in left ear,\"every bone in skull was broken\"     Past Surgical History:   Procedure Laterality Date    HX APPENDECTOMY      HX HEENT  9/2009    facial reconstruction post trauma      No Known Allergies   Social History     Tobacco Use    Smoking status: Current Every Day Smoker     Packs/day: 0.50     Years: 30.00     Pack years: 15.00    Smokeless tobacco: Never Used   Substance Use Topics    Alcohol use: Not Currently     Alcohol/week: 0.8 standard drinks     Types: 1 Cans of beer per week      Family History   Problem Relation Age of Onset    Diabetes Maternal Aunt     Diabetes Paternal Aunt       Family history reviewed and negative except as noted above. Immunization History   Administered Date(s) Administered    Covid-19, MODERNA, Mrna, Lnp-s, Pf, 100mcg/0.5mL 04/23/2021, 05/21/2021     PTA Medications:  Current Outpatient Medications   Medication Instructions    Advair Diskus 250-50 mcg/dose diskus inhaler No dose, route, or frequency recorded.  albuterol (PROVENTIL HFA, VENTOLIN HFA, PROAIR HFA) 90 mcg/actuation inhaler No dose, route, or frequency recorded.  amLODIPine (NORVASC) 5 mg, Oral, DAILY    aspirin delayed-release 81 mg, Oral, DAILY, EC     fluticasone propionate (FLONASE) 50 mcg/actuation nasal spray No dose, route, or frequency recorded.     furosemide (LASIX) 20 mg, Oral, DAILY    gabapentin (NEURONTIN) 300 mg, Oral, 3 TIMES DAILY    HYDROcodone-acetaminophen (Norco)  mg tablet 1 Tablet, Oral, EVERY 4 HOURS    lidocaine-prilocaine (EMLA) topical cream Topical, AS NEEDED    magnesium oxide (MAG-OX) 400 mg, Oral, 4 TIMES DAILY    mirtazapine (REMERON) 15 mg, Oral, EVERY BEDTIME    morphine CR (MS CONTIN) 15 mg, Oral, EVERY 12 HOURS    ondansetron hcl (ZOFRAN) 4 mg, Oral, 7 EVERY 8 HOURS AS NEEDED    pantoprazole (PROTONIX) 40 mg, Oral, DAILY    potassium chloride SR (Klor-Con 10) 10 mEq tablet 10 mEq, Oral, DAILY       Objective:     Patient Vitals for the past 24 hrs:   Temp Pulse Resp BP SpO2   10/06/21 1630  (!) 104 (!) 33 (!) 108/58 98 %   10/06/21 1600  (!) 103  101/75 100 %   10/06/21 1559  (!) 101 26  100 %   10/06/21 1530  (!) 108 (!) 32 102/65 99 %   10/06/21 1500  (!) 111 (!) 33 (!) 100/56 99 %   10/06/21 1436     98 %   10/06/21 1430    109/63 100 %   10/06/21 1428 98.6 °F (37 °C) (!) 112 (!) 32       Oxygen Therapy  O2 Sat (%): 98 % (10/06/21 1630)  Pulse via Oximetry: 105 beats per minute (10/06/21 1630)    Estimated body mass index is 23.15 kg/m² as calculated from the following:    Height as of this encounter: 5' 11\" (1.803 m). Weight as of this encounter: 75.3 kg (166 lb). Intake/Output Summary (Last 24 hours) at 10/6/2021 1735  Last data filed at 10/6/2021 1649  Gross per 24 hour   Intake 1000 ml   Output    Net 1000 ml         Physical Exam:  General:    malnourished. No overt distress  Head:  Normocephalic, atraumatic  Eyes:  Sclerae appear normal.  Pupils equally round. ENT:  Nares appear normal, no drainage. Moist oral mucosa  Neck:  No restricted ROM. Trachea midline   CV:   Tachy, reg. No m/r/g. No jugular venous distension. Lungs:   Diminished on R side. Otherwise clear. Respirations even, unlabored  Abdomen:   Soft, nontender, nondistended. Extremities: No cyanosis or clubbing. No edema  Skin:     No rashes and normal coloration. Warm and dry. Neuro:  CN II-XII grossly intact. Sensation intact. A&Ox3  Psych:  Normal mood and affect.       I have reviewed ordered lab tests and independently visualized imaging below:    Last 24hr Labs:  Recent Results (from the past 24 hour(s))   EKG, 12 LEAD, INITIAL    Collection Time: 10/06/21  2:48 PM   Result Value Ref Range    Ventricular Rate 111 BPM    Atrial Rate 111 BPM    P-R Interval 124 ms    QRS Duration 82 ms    Q-T Interval 310 ms    QTC Calculation (Bezet) 421 ms    Calculated P Axis 68 degrees    Calculated R Axis 87 degrees    Calculated T Axis 66 degrees    Diagnosis       Sinus tachycardia  Otherwise normal ECG  When compared with ECG of 06-OCT-2021 07:56,  Criteria for Septal infarct are no longer Present  Confirmed by MANISH CAVAZOS (), Peng Mortensen ([id number]) on 10/6/2021 4:33:26 PM     TROPONIN-HIGH SENSITIVITY    Collection Time: 10/06/21  3:00 PM   Result Value Ref Range    Troponin-High Sensitivity 38.3 (H) 0 - 14 pg/mL   CBC WITH AUTOMATED DIFF    Collection Time: 10/06/21  3:00 PM   Result Value Ref Range    WBC 5.8 4.3 - 11.1 K/uL    RBC 3.90 (L) 4.23 - 5.6 M/uL    HGB 9.5 (L) 13.6 - 17.2 g/dL    HCT 29.7 (L) 41.1 - 50.3 %    MCV 76.2 (L) 79.6 - 97.8 FL    MCH 24.4 (L) 26.1 - 32.9 PG    MCHC 32.0 31.4 - 35.0 g/dL    RDW 16.9 (H) 11.9 - 14.6 %    PLATELET 482 545 - 345 K/uL    MPV 10.2 9.4 - 12.3 FL    ABSOLUTE NRBC 0.00 0.0 - 0.2 K/uL    DF AUTOMATED      NEUTROPHILS 79 (H) 43 - 78 %    LYMPHOCYTES 15 13 - 44 %    MONOCYTES 2 (L) 4.0 - 12.0 %    EOSINOPHILS 2 0.5 - 7.8 %    BASOPHILS 1 0.0 - 2.0 %    IMMATURE GRANULOCYTES 2 0.0 - 5.0 %    ABS. NEUTROPHILS 4.6 1.7 - 8.2 K/UL    ABS. LYMPHOCYTES 0.9 0.5 - 4.6 K/UL    ABS. MONOCYTES 0.1 0.1 - 1.3 K/UL    ABS. EOSINOPHILS 0.1 0.0 - 0.8 K/UL    ABS. BASOPHILS 0.0 0.0 - 0.2 K/UL    ABS. IMM.  GRANS. 0.1 0.0 - 0.5 K/UL   PROTHROMBIN TIME + INR    Collection Time: 10/06/21  3:00 PM   Result Value Ref Range    Prothrombin time 14.4 12.6 - 14.5 sec    INR 1.1     PTT    Collection Time: 10/06/21  3:00 PM   Result Value Ref Range    aPTT 36.5 (H) 24.1 - 35.1 SEC   LIPASE    Collection Time: 10/06/21  3:00 PM   Result Value Ref Range    Lipase 93 73 - 393 U/L   LACTIC ACID    Collection Time: 10/06/21  3:00 PM   Result Value Ref Range    Lactic acid 1.2 0.4 - 2.0 MMOL/L   D DIMER    Collection Time: 10/06/21  3:00 PM   Result Value Ref Range    D DIMER 19.19 (H) <0.56 ug/ml(FEU)   NT-PRO BNP    Collection Time: 10/06/21  3:00 PM   Result Value Ref Range    NT pro- (H) 5 - 125 PG/ML   COVID-19 RAPID TEST    Collection Time: 10/06/21  3:19 PM   Result Value Ref Range    Specimen source Nasopharyngeal      COVID-19 rapid test Not detected NOTD     TROPONIN-HIGH SENSITIVITY    Collection Time: 10/06/21  4:50 PM   Result Value Ref Range    Troponin-High Sensitivity 38.7 (H) 0 - 14 pg/mL   METABOLIC PANEL, COMPREHENSIVE    Collection Time: 10/06/21  4:50 PM   Result Value Ref Range    Sodium 134 (L) 136 - 145 mmol/L    Potassium 4.0 3.5 - 5.1 mmol/L    Chloride 100 98 - 107 mmol/L    CO2 26 21 - 32 mmol/L    Anion gap 8 7 - 16 mmol/L    Glucose 92 65 - 100 mg/dL    BUN 15 8 - 23 MG/DL    Creatinine 0.48 (L) 0.8 - 1.5 MG/DL    GFR est AA >60 >60 ml/min/1.73m2    GFR est non-AA >60 >60 ml/min/1.73m2    Calcium 9.3 8.3 - 10.4 MG/DL    Bilirubin, total 0.4 0.2 - 1.1 MG/DL    ALT (SGPT) 13 12 - 65 U/L    AST (SGOT) 39 (H) 15 - 37 U/L    Alk. phosphatase 54 50 - 136 U/L    Protein, total 7.5 6.3 - 8.2 g/dL    Albumin 2.3 (L) 3.2 - 4.6 g/dL    Globulin 5.2 (H) 2.3 - 3.5 g/dL    A-G Ratio 0.4 (L) 1.2 - 3.5         All Micro Results     Procedure Component Value Units Date/Time    COVID-19 RAPID TEST [[id number]] Collected: 10/06/21 1519    Order Status: Completed Specimen: Nasopharyngeal Updated: 10/06/21 1547     Specimen source Nasopharyngeal        COVID-19 rapid test Not detected        Comment:      The specimen is NEGATIVE for SARS-CoV-2, the novel coronavirus associated with COVID-19. A negative result does not rule out COVID-19. This test has been authorized by the FDA under an Emergency Use Authorization (EUA) for use by authorized laboratories.         Fact sheet for Healthcare Providers: ConventionUpdate.co.nz  Fact sheet for Patients: ConventionUpdate.co.nz       Methodology: Isothermal Nucleic Acid Amplification               Other Studies:  XR CHEST PORT    Result Date: 10/6/2021  EXAM: CHEST X-RAY, 1 VIEW INDICATION: cough. COMPARISON: Chest x-ray 9/10/2021 TECHNIQUE: Single AP view of the chest was obtained. FINDINGS: Stable large right pleural effusion and dense opacification throughout the right lung consistent with the known mediastinal mass. Mild opacification in the left lung base. 1.  Stable opacification throughout the right hemithorax consistent with the known mass and pleural effusion. 2.  Mild opacification in the left lung base could reflect atelectasis or new infiltrates. Medications Administered     sodium chloride 0.9 % bolus infusion 1,000 mL     Admin Date  10/06/2021 Action  New Bag Dose  1,000 mL Route  IntraVENous Administered By  Bhumi Salas RN                  Signed:  Waldo Schlatter, MD    Part of this note may have been written by using a voice dictation software. The note has been proof read but may still contain some grammatical/other typographical errors.

## (undated) DEVICE — YANKAUER,BULB TIP,W/O VENT,RIGID,STERILE: Brand: MEDLINE

## (undated) DEVICE — SUTURE PDS II SZ 1 L54IN ABSRB VLT L65MM TP-1 1/2 CIR Z879G

## (undated) DEVICE — DRAPE TWL SURG 16X26IN BLU ORB04] ALLCARE INC]

## (undated) DEVICE — SPONGE LAP 18X18IN STRL -- 5/PK

## (undated) DEVICE — BUTTON SWITCH PENCIL BLADE ELECTRODE, HOLSTER: Brand: EDGE

## (undated) DEVICE — SOLUTION IV 1000ML 0.9% SOD CHL

## (undated) DEVICE — TRAY PREP DRY W/ PREM GLV 2 APPL 6 SPNG 2 UNDPD 1 OVERWRAP

## (undated) DEVICE — KIT THORCENT 8FR L5IN POLYUR W/ 18/22/25GA NDL 3 W STPCOCK

## (undated) DEVICE — BLADE ELECTRODE: Brand: VALLEYLAB

## (undated) DEVICE — STERILE POLYISOPRENE POWDER-FREE SURGICAL GLOVES: Brand: PROTEXIS

## (undated) DEVICE — TOTAL 1-LAYER TRAY, LATEX FOLEY, 16FR 10: Brand: MEDLINE

## (undated) DEVICE — SUTURE PROL SZ 5-0 L24IN NONABSORBABLE BLU C-1 L13MM 3/8 8325H

## (undated) DEVICE — SUT ETHLN 3-0 18IN FS1 BLK --

## (undated) DEVICE — SURGICAL PROCEDURE PACK BASIC ST FRANCIS

## (undated) DEVICE — SHEET, T, LAPAROTOMY, STERILE: Brand: MEDLINE

## (undated) DEVICE — 3M™ TEGADERM™ TRANSPARENT FILM DRESSING FRAME STYLE, 1628, 6 IN X 8 IN (15 CM X 20 CM), 10/CT 8CT/CASE: Brand: 3M™ TEGADERM™

## (undated) DEVICE — Device

## (undated) DEVICE — REM POLYHESIVE ADULT PATIENT RETURN ELECTRODE: Brand: VALLEYLAB

## (undated) DEVICE — SUTURE VCRL SZ 0 L27IN ABSRB UD L36MM CT-1 1/2 CIR J260H

## (undated) DEVICE — SUTURE VCRL SZ 3-0 L27IN ABSRB UD L26MM SH 1/2 CIR J416H

## (undated) DEVICE — 2000CC GUARDIAN II: Brand: GUARDIAN

## (undated) DEVICE — SPONGE: SPECIALTY PEANUT XR 100/CS: Brand: MEDICAL ACTION INDUSTRIES

## (undated) DEVICE — SUTURE VCRL SZ 2-0 L27IN ABSRB UD L26MM SH 1/2 CIR J417H

## (undated) DEVICE — DRAIN SURG L49IN DIA1/8IN 10IN H SIL W/O TRCR END PERF

## (undated) DEVICE — LUER-LOK 360°: Brand: CONNECTA, LUER-LOK

## (undated) DEVICE — GEL MEDC ULTRASOUND 5L -- REPLACED BY 326862

## (undated) DEVICE — SINGLE BASIN: Brand: CARDINAL HEALTH

## (undated) DEVICE — SUT SLK 2-0SH 30IN BLK --